# Patient Record
Sex: FEMALE | Race: WHITE | Employment: OTHER | URBAN - NONMETROPOLITAN AREA
[De-identification: names, ages, dates, MRNs, and addresses within clinical notes are randomized per-mention and may not be internally consistent; named-entity substitution may affect disease eponyms.]

---

## 2017-03-21 ENCOUNTER — OFFICE VISIT (OUTPATIENT)
Dept: CARDIOLOGY CLINIC | Age: 82
End: 2017-03-21

## 2017-03-21 VITALS
SYSTOLIC BLOOD PRESSURE: 82 MMHG | HEART RATE: 80 BPM | DIASTOLIC BLOOD PRESSURE: 56 MMHG | WEIGHT: 173 LBS | BODY MASS INDEX: 28.82 KG/M2 | RESPIRATION RATE: 16 BRPM | HEIGHT: 65 IN

## 2017-03-21 DIAGNOSIS — I34.1 MVP (MITRAL VALVE PROLAPSE): ICD-10-CM

## 2017-03-21 DIAGNOSIS — R25.1 TREMORS OF NERVOUS SYSTEM: ICD-10-CM

## 2017-03-21 DIAGNOSIS — F41.9 ANXIETY: ICD-10-CM

## 2017-03-21 DIAGNOSIS — R00.2 HEART PALPITATIONS: Primary | ICD-10-CM

## 2017-03-21 DIAGNOSIS — E78.2 MIXED HYPERLIPIDEMIA: ICD-10-CM

## 2017-03-21 PROCEDURE — 4040F PNEUMOC VAC/ADMIN/RCVD: CPT | Performed by: INTERNAL MEDICINE

## 2017-03-21 PROCEDURE — G8420 CALC BMI NORM PARAMETERS: HCPCS | Performed by: INTERNAL MEDICINE

## 2017-03-21 PROCEDURE — 1036F TOBACCO NON-USER: CPT | Performed by: INTERNAL MEDICINE

## 2017-03-21 PROCEDURE — 1123F ACP DISCUSS/DSCN MKR DOCD: CPT | Performed by: INTERNAL MEDICINE

## 2017-03-21 PROCEDURE — G8399 PT W/DXA RESULTS DOCUMENT: HCPCS | Performed by: INTERNAL MEDICINE

## 2017-03-21 PROCEDURE — G8484 FLU IMMUNIZE NO ADMIN: HCPCS | Performed by: INTERNAL MEDICINE

## 2017-03-21 PROCEDURE — G8427 DOCREV CUR MEDS BY ELIG CLIN: HCPCS | Performed by: INTERNAL MEDICINE

## 2017-03-21 PROCEDURE — 99213 OFFICE O/P EST LOW 20 MIN: CPT | Performed by: INTERNAL MEDICINE

## 2017-03-21 PROCEDURE — 1090F PRES/ABSN URINE INCON ASSESS: CPT | Performed by: INTERNAL MEDICINE

## 2017-03-21 RX ORDER — CLOPIDOGREL BISULFATE 75 MG/1
75 TABLET ORAL DAILY
Qty: 90 TABLET | Refills: 3 | Status: SHIPPED | OUTPATIENT
Start: 2017-03-21 | End: 2019-03-18 | Stop reason: SDUPTHER

## 2017-03-21 RX ORDER — LOVASTATIN 20 MG/1
TABLET ORAL
Qty: 90 TABLET | Refills: 3 | Status: SHIPPED | OUTPATIENT
Start: 2017-03-21 | End: 2020-07-13

## 2017-03-21 RX ORDER — PROPRANOLOL HYDROCHLORIDE 10 MG/1
TABLET ORAL
Refills: 3 | COMMUNITY
Start: 2017-02-27 | End: 2019-03-18 | Stop reason: DRUGHIGH

## 2018-02-13 LAB
ALBUMIN SERPL-MCNC: 4.4 G/DL
ALP BLD-CCNC: 66 U/L
ALT SERPL-CCNC: 13 U/L
ANION GAP SERPL CALCULATED.3IONS-SCNC: NORMAL MMOL/L
AST SERPL-CCNC: 17 U/L
BASOPHILS ABSOLUTE: NORMAL /ΜL
BASOPHILS RELATIVE PERCENT: NORMAL %
BILIRUB SERPL-MCNC: 0.5 MG/DL (ref 0.1–1.4)
BUN BLDV-MCNC: 20 MG/DL
CALCIUM SERPL-MCNC: NORMAL MG/DL
CHLORIDE BLD-SCNC: 101 MMOL/L
CHOLESTEROL, TOTAL: 191 MG/DL
CHOLESTEROL/HDL RATIO: ABNORMAL
CO2: NORMAL MMOL/L
CREAT SERPL-MCNC: 0.6 MG/DL
EOSINOPHILS ABSOLUTE: NORMAL /ΜL
EOSINOPHILS RELATIVE PERCENT: NORMAL %
GFR CALCULATED: NORMAL
GLUCOSE BLD-MCNC: 91 MG/DL
HCT VFR BLD CALC: 41.3 % (ref 36–46)
HDLC SERPL-MCNC: 81 MG/DL (ref 35–70)
HEMOGLOBIN: 14.2 G/DL (ref 12–16)
LDL CHOLESTEROL CALCULATED: 84 MG/DL (ref 0–160)
LYMPHOCYTES ABSOLUTE: NORMAL /ΜL
LYMPHOCYTES RELATIVE PERCENT: NORMAL %
MCH RBC QN AUTO: NORMAL PG
MCHC RBC AUTO-ENTMCNC: NORMAL G/DL
MCV RBC AUTO: NORMAL FL
MONOCYTES ABSOLUTE: NORMAL /ΜL
MONOCYTES RELATIVE PERCENT: NORMAL %
NEUTROPHILS ABSOLUTE: NORMAL /ΜL
NEUTROPHILS RELATIVE PERCENT: NORMAL %
PLATELET # BLD: 207 K/ΜL
PMV BLD AUTO: NORMAL FL
POTASSIUM SERPL-SCNC: 4.2 MMOL/L
RBC # BLD: 4.54 10^6/ΜL
SODIUM BLD-SCNC: 142 MMOL/L
TOTAL PROTEIN: NORMAL
TRIGL SERPL-MCNC: 128 MG/DL
VLDLC SERPL CALC-MCNC: ABNORMAL MG/DL
WBC # BLD: 4.5 10^3/ML

## 2018-03-20 ENCOUNTER — OFFICE VISIT (OUTPATIENT)
Dept: CARDIOLOGY CLINIC | Age: 83
End: 2018-03-20

## 2018-03-20 VITALS
RESPIRATION RATE: 16 BRPM | BODY MASS INDEX: 28.16 KG/M2 | SYSTOLIC BLOOD PRESSURE: 96 MMHG | DIASTOLIC BLOOD PRESSURE: 52 MMHG | HEIGHT: 65 IN | WEIGHT: 169 LBS | HEART RATE: 76 BPM

## 2018-03-20 DIAGNOSIS — I25.10 CORONARY ARTERY DISEASE INVOLVING NATIVE CORONARY ARTERY OF NATIVE HEART WITHOUT ANGINA PECTORIS: Primary | ICD-10-CM

## 2018-03-20 DIAGNOSIS — E78.2 MIXED HYPERLIPIDEMIA: ICD-10-CM

## 2018-03-20 DIAGNOSIS — R00.2 HEART PALPITATIONS: ICD-10-CM

## 2018-03-20 DIAGNOSIS — R25.1 TREMORS OF NERVOUS SYSTEM: ICD-10-CM

## 2018-03-20 PROCEDURE — 1090F PRES/ABSN URINE INCON ASSESS: CPT | Performed by: INTERNAL MEDICINE

## 2018-03-20 PROCEDURE — 1036F TOBACCO NON-USER: CPT | Performed by: INTERNAL MEDICINE

## 2018-03-20 PROCEDURE — 4040F PNEUMOC VAC/ADMIN/RCVD: CPT | Performed by: INTERNAL MEDICINE

## 2018-03-20 PROCEDURE — G8419 CALC BMI OUT NRM PARAM NOF/U: HCPCS | Performed by: INTERNAL MEDICINE

## 2018-03-20 PROCEDURE — G8484 FLU IMMUNIZE NO ADMIN: HCPCS | Performed by: INTERNAL MEDICINE

## 2018-03-20 PROCEDURE — 99214 OFFICE O/P EST MOD 30 MIN: CPT | Performed by: INTERNAL MEDICINE

## 2018-03-20 PROCEDURE — G8598 ASA/ANTIPLAT THER USED: HCPCS | Performed by: INTERNAL MEDICINE

## 2018-03-20 PROCEDURE — G8427 DOCREV CUR MEDS BY ELIG CLIN: HCPCS | Performed by: INTERNAL MEDICINE

## 2018-03-20 PROCEDURE — G8399 PT W/DXA RESULTS DOCUMENT: HCPCS | Performed by: INTERNAL MEDICINE

## 2018-03-20 PROCEDURE — 1123F ACP DISCUSS/DSCN MKR DOCD: CPT | Performed by: INTERNAL MEDICINE

## 2018-03-20 RX ORDER — CELECOXIB 200 MG/1
200 CAPSULE ORAL DAILY
COMMUNITY

## 2018-03-20 NOTE — ASSESSMENT & PLAN NOTE
Patient is to bring most recent lipid results for further recommendations. Continue the current dose of statin for now.

## 2018-03-20 NOTE — PROGRESS NOTES
Keesha Louise  1935  Sandip Escalante MD    Chief complaint and HPI:  Keesha Louise  is 60-year-old female follows up for known history of ectatic coronary arteries, hyperlipidemia and a history of palpitations. She denies chest pain, syncope or near syncope. She has palpitations once in a while they don't last long. She is not a smoker and has been fairly compliant to her medications. She has developed a spur on her right knee and it hurts when she walks and she is not physically very active however does a lot of yard work during summertime. She takes Lovastatin and Plavix. She states she had recent labs done but no results in Epic. Rest of the Cardiovascular system review is otherwise unchanged from prior encounter. Past medical history:  has a past medical history of Anxiety; Coronary artery disease involving native coronary artery of native heart without angina pectoris; Essential tremor; H/O cardiac catheterization; H/O cardiovascular stress test; H/O echocardiogram; H/O exercise stress test; Heart murmur; Heart palpitations; Hyperlipidemia; MVP (mitral valve prolapse); and Obesity. Past surgical history:  has a past surgical history that includes Cholecystectomy; Tonsillectomy; and Dilation and curettage of uterus. Social History:   Social History   Substance Use Topics    Smoking status: Never Smoker    Smokeless tobacco: Never Used    Alcohol use No     Family history: family history includes Cancer in her father and mother; Heart Attack in her father. ALLERGIES:  Lescol; Lipitor; Pcn [penicillins]; and Zocor [simvastatin]  Prior to Admission medications    Medication Sig Start Date End Date Taking?  Authorizing Provider   celecoxib (CELEBREX) 200 MG capsule Take 200 mg by mouth daily   Yes Historical Provider, MD   propranolol (INDERAL) 10 MG tablet TAKE 1 TABLET BY MOUTH 3 TIMES A DAY 2/27/17  Yes Historical Provider, MD   clopidogrel (PLAVIX) 75 MG tablet Take 1 tablet by mouth coronary artery of native heart without angina pectoris  Continue Plavix and statin therapy. Heart palpitations  Not very symptomatic to alter any thing now. Continue Inderal 10 mg 3 times a day. Hyperlipidemia  Patient is to bring most recent lipid results for further recommendations. Continue the current dose of statin for now. Tremors of nervous system  This seemed to be stable for now. Continue current cardiovascular medications which have been reviewed and discussed individually with you. Primary prevention is the goal by aggressive risk modification, healthy and therapeutic life style changes for cardiovascular risk reduction. Please bring all medication bottles and most recent labs to each office visit  Appropriate prescriptions if needed on this visit are addressed. After visit summery is provided. Questions answered and patient verbalizes understanding. Follow up in office in 12 months, sooner if needed. Liz Carney MD, 3/20/2018 2:55 PM     Please note this report has been partially produced using speech recognition software and may contain errors related to that system including errors in grammar, punctuation, and spelling, as well as words and phrases that may be inappropriate. If there are any questions or concerns please feel free to contact the dictating provider for clarification.

## 2018-05-07 ENCOUNTER — TELEPHONE (OUTPATIENT)
Dept: CARDIOLOGY CLINIC | Age: 83
End: 2018-05-07

## 2019-03-18 ENCOUNTER — OFFICE VISIT (OUTPATIENT)
Dept: CARDIOLOGY CLINIC | Age: 84
End: 2019-03-18
Payer: MEDICARE

## 2019-03-18 VITALS
WEIGHT: 179 LBS | DIASTOLIC BLOOD PRESSURE: 66 MMHG | HEART RATE: 67 BPM | RESPIRATION RATE: 16 BRPM | BODY MASS INDEX: 29.82 KG/M2 | HEIGHT: 65 IN | SYSTOLIC BLOOD PRESSURE: 110 MMHG

## 2019-03-18 DIAGNOSIS — I25.10 CORONARY ARTERY DISEASE INVOLVING NATIVE CORONARY ARTERY OF NATIVE HEART WITHOUT ANGINA PECTORIS: ICD-10-CM

## 2019-03-18 DIAGNOSIS — R00.2 HEART PALPITATIONS: ICD-10-CM

## 2019-03-18 DIAGNOSIS — M25.512 LEFT SHOULDER PAIN, UNSPECIFIED CHRONICITY: ICD-10-CM

## 2019-03-18 DIAGNOSIS — R07.9 CHEST PAIN, UNSPECIFIED TYPE: ICD-10-CM

## 2019-03-18 DIAGNOSIS — E78.2 MIXED HYPERLIPIDEMIA: ICD-10-CM

## 2019-03-18 DIAGNOSIS — Z01.810 PREOP CARDIOVASCULAR EXAM: Primary | ICD-10-CM

## 2019-03-18 PROCEDURE — 93000 ELECTROCARDIOGRAM COMPLETE: CPT | Performed by: INTERNAL MEDICINE

## 2019-03-18 PROCEDURE — 1101F PT FALLS ASSESS-DOCD LE1/YR: CPT | Performed by: INTERNAL MEDICINE

## 2019-03-18 PROCEDURE — 99213 OFFICE O/P EST LOW 20 MIN: CPT | Performed by: INTERNAL MEDICINE

## 2019-03-18 PROCEDURE — G8399 PT W/DXA RESULTS DOCUMENT: HCPCS | Performed by: INTERNAL MEDICINE

## 2019-03-18 PROCEDURE — 1123F ACP DISCUSS/DSCN MKR DOCD: CPT | Performed by: INTERNAL MEDICINE

## 2019-03-18 PROCEDURE — G8598 ASA/ANTIPLAT THER USED: HCPCS | Performed by: INTERNAL MEDICINE

## 2019-03-18 PROCEDURE — 1036F TOBACCO NON-USER: CPT | Performed by: INTERNAL MEDICINE

## 2019-03-18 PROCEDURE — 1090F PRES/ABSN URINE INCON ASSESS: CPT | Performed by: INTERNAL MEDICINE

## 2019-03-18 PROCEDURE — G8419 CALC BMI OUT NRM PARAM NOF/U: HCPCS | Performed by: INTERNAL MEDICINE

## 2019-03-18 PROCEDURE — G8484 FLU IMMUNIZE NO ADMIN: HCPCS | Performed by: INTERNAL MEDICINE

## 2019-03-18 PROCEDURE — 4040F PNEUMOC VAC/ADMIN/RCVD: CPT | Performed by: INTERNAL MEDICINE

## 2019-03-18 PROCEDURE — G8427 DOCREV CUR MEDS BY ELIG CLIN: HCPCS | Performed by: INTERNAL MEDICINE

## 2019-03-18 RX ORDER — PROPRANOLOL HYDROCHLORIDE 10 MG/1
20 TABLET ORAL 3 TIMES DAILY PRN
COMMUNITY
End: 2020-07-13

## 2019-03-18 RX ORDER — VIT A/VIT C/VIT E/ZINC/COPPER 4296-226
1 CAPSULE ORAL 2 TIMES DAILY
COMMUNITY

## 2019-03-18 RX ORDER — CLOPIDOGREL BISULFATE 75 MG/1
75 TABLET ORAL DAILY
Qty: 90 TABLET | Refills: 3 | Status: SHIPPED | OUTPATIENT
Start: 2019-03-18 | End: 2019-10-07 | Stop reason: SDUPTHER

## 2019-10-07 ENCOUNTER — OFFICE VISIT (OUTPATIENT)
Dept: CARDIOLOGY CLINIC | Age: 84
End: 2019-10-07
Payer: MEDICARE

## 2019-10-07 VITALS
SYSTOLIC BLOOD PRESSURE: 112 MMHG | BODY MASS INDEX: 29.82 KG/M2 | DIASTOLIC BLOOD PRESSURE: 70 MMHG | RESPIRATION RATE: 16 BRPM | WEIGHT: 179 LBS | HEIGHT: 65 IN | HEART RATE: 60 BPM

## 2019-10-07 DIAGNOSIS — M25.512 LEFT SHOULDER PAIN, UNSPECIFIED CHRONICITY: ICD-10-CM

## 2019-10-07 DIAGNOSIS — R00.2 HEART PALPITATIONS: ICD-10-CM

## 2019-10-07 DIAGNOSIS — E78.2 MIXED HYPERLIPIDEMIA: ICD-10-CM

## 2019-10-07 DIAGNOSIS — I25.10 CORONARY ARTERY DISEASE INVOLVING NATIVE CORONARY ARTERY OF NATIVE HEART WITHOUT ANGINA PECTORIS: ICD-10-CM

## 2019-10-07 DIAGNOSIS — Z01.810 PREOP CARDIOVASCULAR EXAM: Primary | ICD-10-CM

## 2019-10-07 LAB
BASOPHILS ABSOLUTE: NORMAL /ΜL
BASOPHILS RELATIVE PERCENT: NORMAL %
BUN BLDV-MCNC: 16 MG/DL
CALCIUM SERPL-MCNC: NORMAL MG/DL
CHLORIDE BLD-SCNC: 103 MMOL/L
CO2: NORMAL MMOL/L
CREAT SERPL-MCNC: 0.8 MG/DL
EOSINOPHILS ABSOLUTE: NORMAL /ΜL
EOSINOPHILS RELATIVE PERCENT: NORMAL %
GFR CALCULATED: NORMAL
GLUCOSE BLD-MCNC: 95 MG/DL
HCT VFR BLD CALC: 41.1 % (ref 36–46)
HEMOGLOBIN: 13.9 G/DL (ref 12–16)
LYMPHOCYTES ABSOLUTE: NORMAL /ΜL
LYMPHOCYTES RELATIVE PERCENT: NORMAL %
MCH RBC QN AUTO: NORMAL PG
MCHC RBC AUTO-ENTMCNC: NORMAL G/DL
MCV RBC AUTO: NORMAL FL
MONOCYTES ABSOLUTE: NORMAL /ΜL
MONOCYTES RELATIVE PERCENT: NORMAL %
NEUTROPHILS ABSOLUTE: NORMAL /ΜL
NEUTROPHILS RELATIVE PERCENT: NORMAL %
PLATELET # BLD: 183 K/ΜL
PMV BLD AUTO: NORMAL FL
POTASSIUM SERPL-SCNC: 4.5 MMOL/L
RBC # BLD: 4.52 10^6/ΜL
SODIUM BLD-SCNC: 139 MMOL/L
WBC # BLD: 3.9 10^3/ML

## 2019-10-07 PROCEDURE — 1123F ACP DISCUSS/DSCN MKR DOCD: CPT | Performed by: INTERNAL MEDICINE

## 2019-10-07 PROCEDURE — G8598 ASA/ANTIPLAT THER USED: HCPCS | Performed by: INTERNAL MEDICINE

## 2019-10-07 PROCEDURE — G8427 DOCREV CUR MEDS BY ELIG CLIN: HCPCS | Performed by: INTERNAL MEDICINE

## 2019-10-07 PROCEDURE — 99214 OFFICE O/P EST MOD 30 MIN: CPT | Performed by: INTERNAL MEDICINE

## 2019-10-07 PROCEDURE — G8484 FLU IMMUNIZE NO ADMIN: HCPCS | Performed by: INTERNAL MEDICINE

## 2019-10-07 PROCEDURE — G8399 PT W/DXA RESULTS DOCUMENT: HCPCS | Performed by: INTERNAL MEDICINE

## 2019-10-07 PROCEDURE — 4040F PNEUMOC VAC/ADMIN/RCVD: CPT | Performed by: INTERNAL MEDICINE

## 2019-10-07 PROCEDURE — 1090F PRES/ABSN URINE INCON ASSESS: CPT | Performed by: INTERNAL MEDICINE

## 2019-10-07 PROCEDURE — G8419 CALC BMI OUT NRM PARAM NOF/U: HCPCS | Performed by: INTERNAL MEDICINE

## 2019-10-07 PROCEDURE — 93000 ELECTROCARDIOGRAM COMPLETE: CPT | Performed by: INTERNAL MEDICINE

## 2019-10-07 PROCEDURE — 1036F TOBACCO NON-USER: CPT | Performed by: INTERNAL MEDICINE

## 2019-10-07 RX ORDER — CLOPIDOGREL BISULFATE 75 MG/1
75 TABLET ORAL DAILY
Qty: 90 TABLET | Refills: 3 | Status: SHIPPED | OUTPATIENT
Start: 2019-10-07 | End: 2021-02-09 | Stop reason: SDUPTHER

## 2019-11-06 PROBLEM — Z01.810 PREOP CARDIOVASCULAR EXAM: Status: RESOLVED | Noted: 2019-10-07 | Resolved: 2019-11-06

## 2020-07-10 LAB
ALBUMIN SERPL-MCNC: 4 G/DL
ALP BLD-CCNC: 61 U/L
ALT SERPL-CCNC: 11 U/L
ANION GAP SERPL CALCULATED.3IONS-SCNC: NORMAL MMOL/L
AST SERPL-CCNC: 15 U/L
BASOPHILS ABSOLUTE: NORMAL
BASOPHILS RELATIVE PERCENT: NORMAL
BILIRUB SERPL-MCNC: 0.4 MG/DL (ref 0.1–1.4)
BUN BLDV-MCNC: 19 MG/DL
CALCIUM SERPL-MCNC: NORMAL MG/DL
CHLORIDE BLD-SCNC: 100 MMOL/L
CHOLESTEROL, TOTAL: 171 MG/DL
CHOLESTEROL/HDL RATIO: NORMAL
CO2: NORMAL
CREAT SERPL-MCNC: 0.7 MG/DL
EOSINOPHILS ABSOLUTE: NORMAL
EOSINOPHILS RELATIVE PERCENT: NORMAL
GFR CALCULATED: NORMAL
GLUCOSE BLD-MCNC: 90 MG/DL
HCT VFR BLD CALC: 40.2 % (ref 36–46)
HDLC SERPL-MCNC: 69 MG/DL (ref 35–70)
HEMOGLOBIN: 13.4 G/DL (ref 12–16)
LDL CHOLESTEROL CALCULATED: 72 MG/DL (ref 0–160)
LYMPHOCYTES ABSOLUTE: NORMAL
LYMPHOCYTES RELATIVE PERCENT: NORMAL
MCH RBC QN AUTO: NORMAL PG
MCHC RBC AUTO-ENTMCNC: NORMAL G/DL
MCV RBC AUTO: NORMAL FL
MONOCYTES ABSOLUTE: NORMAL
MONOCYTES RELATIVE PERCENT: NORMAL
NEUTROPHILS ABSOLUTE: NORMAL
NEUTROPHILS RELATIVE PERCENT: NORMAL
PLATELET # BLD: 199 K/ΜL
PMV BLD AUTO: NORMAL FL
POTASSIUM SERPL-SCNC: 4.2 MMOL/L
RBC # BLD: 4.34 10^6/ΜL
SODIUM BLD-SCNC: 141 MMOL/L
TOTAL PROTEIN: NORMAL
TRIGL SERPL-MCNC: 150 MG/DL
TSH SERPL DL<=0.05 MIU/L-ACNC: 2.73 UIU/ML
VLDLC SERPL CALC-MCNC: NORMAL MG/DL
WBC # BLD: 5.6 10^3/ML

## 2020-07-13 ENCOUNTER — VIRTUAL VISIT (OUTPATIENT)
Dept: CARDIOLOGY CLINIC | Age: 85
End: 2020-07-13
Payer: MEDICARE

## 2020-07-13 PROCEDURE — G8427 DOCREV CUR MEDS BY ELIG CLIN: HCPCS | Performed by: INTERNAL MEDICINE

## 2020-07-13 PROCEDURE — 1123F ACP DISCUSS/DSCN MKR DOCD: CPT | Performed by: INTERNAL MEDICINE

## 2020-07-13 PROCEDURE — 1036F TOBACCO NON-USER: CPT | Performed by: INTERNAL MEDICINE

## 2020-07-13 PROCEDURE — 4040F PNEUMOC VAC/ADMIN/RCVD: CPT | Performed by: INTERNAL MEDICINE

## 2020-07-13 PROCEDURE — 1090F PRES/ABSN URINE INCON ASSESS: CPT | Performed by: INTERNAL MEDICINE

## 2020-07-13 PROCEDURE — G8419 CALC BMI OUT NRM PARAM NOF/U: HCPCS | Performed by: INTERNAL MEDICINE

## 2020-07-13 PROCEDURE — 99214 OFFICE O/P EST MOD 30 MIN: CPT | Performed by: INTERNAL MEDICINE

## 2020-07-13 PROCEDURE — G8399 PT W/DXA RESULTS DOCUMENT: HCPCS | Performed by: INTERNAL MEDICINE

## 2020-07-13 RX ORDER — CHOLESTYRAMINE 4 G/9G
1 POWDER, FOR SUSPENSION ORAL 2 TIMES DAILY
COMMUNITY
End: 2021-01-06

## 2020-07-13 RX ORDER — ROSUVASTATIN CALCIUM 5 MG/1
5 TABLET, COATED ORAL DAILY
COMMUNITY
End: 2021-11-22

## 2020-07-13 RX ORDER — PROPRANOLOL HYDROCHLORIDE 120 MG/1
120 CAPSULE, EXTENDED RELEASE ORAL DAILY
COMMUNITY
End: 2021-01-06

## 2020-07-13 NOTE — PROGRESS NOTES
2020    TELEHEALTH EVALUATION -- Audio/Visual (During BPARU-23 public health emergency)    HPI:   Chief Complaint   Patient presents with    Coronary Artery Disease     for coronary artery disease, hyperlipidemia. She denies chest pain, shortness of breath, dizziness, edema or palpitations. She states she had recent labs done but no results in Epic.  Hyperlipidemia    6 Month Follow-Up     Greer Booker (:  1935) has requested an audio/video evaluation for known hypertension and hyperlipidemia and history of palpitations. Patient had the surgery done for uterine prolapse since last office visit in October and it has helped her quite a bit with with her symptoms she was having and she is not having any palpitations anymore. She has been compliant to her medications and apparently had a blood test done for cholesterol last week by PCP the results are not available yet. Her medications are reviewed. She does not smoke. Prior to Visit Medications    Medication Sig Taking?  Authorizing Provider   rosuvastatin (CRESTOR) 5 MG tablet Take 5 mg by mouth daily Yes Historical Provider, MD   propranolol (INDERAL LA) 120 MG extended release capsule Take 120 mg by mouth daily Yes Historical Provider, MD   cholestyramine (QUESTRAN) 4 g packet Take 1 packet by mouth 2 times daily Yes Historical Provider, MD   Probiotic Product (PROBIOTIC-10 PO) Take by mouth Yes Historical Provider, MD   clopidogrel (PLAVIX) 75 MG tablet Take 1 tablet by mouth daily Yes Julia Walsh MD   Multiple Vitamins-Minerals (PRESERVISION AREDS) CAPS Take 1 capsule by mouth 2 times daily Yes Historical Provider, MD   celecoxib (CELEBREX) 200 MG capsule Take 200 mg by mouth daily Yes Historical Provider, MD   Cholecalciferol (VITAMIN D-3 PO) Take by mouth daily Yes Historical Provider, MD     Social History     Tobacco Use    Smoking status: Never Smoker    Smokeless tobacco: Never Used   Substance Use Topics    Alcohol use: No     PHYSICAL EXAMINATION:  [ INSTRUCTIONS:  \"[x]\" Indicates a positive item  \"[]\" Indicates a negative item  -- DELETE ALL ITEMS NOT EXAMINED]  Vital Signs: (As obtained by patient/caregiver or practitioner observation)  Patient-Reported Vitals 7/13/2020   Patient-Reported Weight 175 lbs   Patient-Reported Height 5 5   Patient-Reported Systolic 249   Patient-Reported Diastolic 60       Wt Readings from Last 3 Encounters:   10/07/19 179 lb (81.2 kg)   03/18/19 179 lb (81.2 kg)   03/20/18 169 lb (76.7 kg)     Constitutional: [x] Appears well-developed and well-nourished [x] No apparent distress      [] Abnormal-   Mental status  [x] Alert and awake  [x] Oriented to person/place/time []Able to follow commands      Eyes:  EOM    [x]  Normal  [] Abnormal-  Sclera  []  Normal  [] Abnormal -         Discharge []  None visible  [] Abnormal -    HENT:   [x] Normocephalic, atraumatic. [] Abnormal   [] Mouth/Throat: Mucous membranes are moist.     External Ears [x] Normal  [] Abnormal-     Pulmonary/Chest: [x] Respiratory effort normal.  [x] No visualized signs of difficulty breathing or respiratory distress        [] Abnormal-      Neurological:        [x] No Facial Asymmetry (Cranial nerve 7 motor function) (limited exam to video visit)          [] No gaze palsy        [] Abnormal-         Skin:        [x] No significant exanthematous lesions or discoloration noted on facial skin         [] Abnormal-            Psychiatric:       [x] Normal Affect [] No Hallucinations        [] Abnormal-   Other pertinent observable physical exam findings-     Due to this being a TeleHealth encounter, evaluation of the following organ systems is limited: Vitals/Constitutional/EENT/Resp/CV/GI//MS/Neuro/Skin/Heme-Lymph-Imm.     Lab Results   Component Value Date    WBC 3.9 10/07/2019    HGB 13.9 10/07/2019    HCT 41.1 10/07/2019     10/07/2019     Lab Results   Component Value Date    CHOL 191 02/13/2018    TRIG 128 02/13/2018 HDL 81 (A) 02/13/2018    LDLCALC 84 02/13/2018     Lab Results   Component Value Date    BUN 16 10/07/2019    CREATININE 0.8 10/07/2019     10/07/2019    K 4.5 10/07/2019     ASSESSMENT/PLAN:    Coronary artery disease involving native coronary artery of native heart without angina pectoris  Clinically stable on current medications. Continue aggressive risk modification for primary prevention. Hyperlipidemia  Last LDL was 84. Continue current medications    Heart palpitations  Improved significantly. continue current medications. Multiple questions are answered and patient verbalized understanding. Follow up office visit in 6 months sooner if needed. Please bring all medication bottles and most recent labs to each office visit      An  electronic signature was used to authenticate this note. --Mariaa Cormier MD on 7/13/2020 at 2:30 PM        Pursuant to the emergency declaration under the [facility], [facility]5 waiver authority and the [employer] Act, this Virtual  Visit was conducted, with patient's consent, to reduce the patient's risk of exposure to COVID-19 and provide continuity of care for an established patient. Services were provided through a video synchronous discussion virtually to substitute for in-person clinic visit.

## 2020-07-15 LAB
ALBUMIN SERPL-MCNC: 4 G/DL
ALP BLD-CCNC: 61 U/L
ALT SERPL-CCNC: 11 U/L
ANION GAP SERPL CALCULATED.3IONS-SCNC: NORMAL MMOL/L
AST SERPL-CCNC: 15 U/L
BASOPHILS ABSOLUTE: NORMAL
BASOPHILS RELATIVE PERCENT: NORMAL
BILIRUB SERPL-MCNC: 0.4 MG/DL (ref 0.1–1.4)
BUN BLDV-MCNC: 27 MG/DL
CALCIUM SERPL-MCNC: 8.7 MG/DL
CHLORIDE BLD-SCNC: 100 MMOL/L
CHOLESTEROL, TOTAL: 171 MG/DL
CHOLESTEROL/HDL RATIO: NORMAL
CO2: 26 MMOL/L
CREAT SERPL-MCNC: 0.7 MG/DL
EOSINOPHILS ABSOLUTE: NORMAL
EOSINOPHILS RELATIVE PERCENT: NORMAL
GFR CALCULATED: NORMAL
GLUCOSE BLD-MCNC: 90 MG/DL
HCT VFR BLD CALC: 40.2 % (ref 36–46)
HDLC SERPL-MCNC: 69 MG/DL (ref 35–70)
HEMOGLOBIN: 13.4 G/DL (ref 12–16)
LDL CHOLESTEROL CALCULATED: 72 MG/DL (ref 0–160)
LYMPHOCYTES ABSOLUTE: NORMAL
LYMPHOCYTES RELATIVE PERCENT: NORMAL
MCH RBC QN AUTO: NORMAL PG
MCHC RBC AUTO-ENTMCNC: NORMAL G/DL
MCV RBC AUTO: NORMAL FL
MONOCYTES ABSOLUTE: NORMAL
MONOCYTES RELATIVE PERCENT: NORMAL
NEUTROPHILS ABSOLUTE: NORMAL
NEUTROPHILS RELATIVE PERCENT: NORMAL
PLATELET # BLD: 199 K/ΜL
PMV BLD AUTO: NORMAL FL
POTASSIUM SERPL-SCNC: 4.2 MMOL/L
RBC # BLD: 4.34 10^6/ΜL
SODIUM BLD-SCNC: 141 MMOL/L
TOTAL PROTEIN: 5.9
TRIGL SERPL-MCNC: 150 MG/DL
TSH SERPL DL<=0.05 MIU/L-ACNC: 2.73 UIU/ML
VLDLC SERPL CALC-MCNC: 30 MG/DL
WBC # BLD: 5.6 10^3/ML

## 2020-08-25 ENCOUNTER — HOSPITAL ENCOUNTER (OUTPATIENT)
Age: 85
Discharge: HOME | End: 2020-08-25
Payer: MEDICARE

## 2020-08-25 PROCEDURE — 36415 COLL VENOUS BLD VENIPUNCTURE: CPT

## 2020-08-25 PROCEDURE — 86320 SERUM IMMUNOELECTROPHORESIS: CPT

## 2020-08-25 PROCEDURE — 82652 VIT D 1 25-DIHYDROXY: CPT

## 2020-08-25 PROCEDURE — 82746 ASSAY OF FOLIC ACID SERUM: CPT

## 2020-08-25 PROCEDURE — 82607 VITAMIN B-12: CPT

## 2020-08-26 LAB
FOLATE: 14.5 NG/ML (ref 3.1–17.5)
SPEP INTERPRETATION: NORMAL

## 2020-08-28 LAB
VITAMIN B-12: 872 PG/ML (ref 180–914)
VITAMIN D 1,25-DIHYDROXY: 61.7 PG/ML (ref 19.9–79.3)

## 2020-12-05 ENCOUNTER — APPOINTMENT (OUTPATIENT)
Dept: GENERAL RADIOLOGY | Age: 85
End: 2020-12-05
Payer: MEDICARE

## 2020-12-05 ENCOUNTER — HOSPITAL ENCOUNTER (EMERGENCY)
Age: 85
Discharge: HOME | End: 2020-12-05
Attending: EMERGENCY MEDICINE
Payer: MEDICARE

## 2020-12-05 VITALS
OXYGEN SATURATION: 95 % | DIASTOLIC BLOOD PRESSURE: 77 MMHG | WEIGHT: 175 LBS | SYSTOLIC BLOOD PRESSURE: 130 MMHG | BODY MASS INDEX: 29.88 KG/M2 | HEART RATE: 91 BPM | TEMPERATURE: 99.8 F | RESPIRATION RATE: 16 BRPM | HEIGHT: 64 IN

## 2020-12-05 LAB
ALBUMIN SERPL-MCNC: 3.7 GM/DL (ref 3.4–5)
ALP BLD-CCNC: 75 IU/L (ref 40–129)
ALT SERPL-CCNC: 9 U/L (ref 10–40)
ANION GAP SERPL CALCULATED.3IONS-SCNC: 11 MMOL/L (ref 4–16)
APTT: 40.4 SECONDS (ref 25.1–37.1)
AST SERPL-CCNC: 21 IU/L (ref 15–37)
BASOPHILS ABSOLUTE: 0 K/CU MM
BASOPHILS RELATIVE PERCENT: 0.2 % (ref 0–1)
BILIRUB SERPL-MCNC: 0.5 MG/DL (ref 0–1)
BUN BLDV-MCNC: 14 MG/DL (ref 6–23)
CALCIUM SERPL-MCNC: 8.5 MG/DL (ref 8.3–10.6)
CHLORIDE BLD-SCNC: 101 MMOL/L (ref 99–110)
CO2: 24 MMOL/L (ref 21–32)
CREAT SERPL-MCNC: 0.8 MG/DL (ref 0.6–1.1)
DIFFERENTIAL TYPE: ABNORMAL
EOSINOPHILS ABSOLUTE: 0.1 K/CU MM
EOSINOPHILS RELATIVE PERCENT: 0.9 % (ref 0–3)
GFR AFRICAN AMERICAN: >60 ML/MIN/1.73M2
GFR NON-AFRICAN AMERICAN: >60 ML/MIN/1.73M2
GLUCOSE BLD-MCNC: 113 MG/DL (ref 70–99)
HCT VFR BLD CALC: 39.2 % (ref 37–47)
HEMOGLOBIN: 12.7 GM/DL (ref 12.5–16)
IMMATURE NEUTROPHIL %: 0.2 % (ref 0–0.43)
INR BLD: 1.1 INDEX
LACTATE: 1.2 MMOL/L (ref 0.4–2)
LIPASE: 32 IU/L (ref 13–60)
LYMPHOCYTES ABSOLUTE: 1.3 K/CU MM
LYMPHOCYTES RELATIVE PERCENT: 23.4 % (ref 24–44)
MCH RBC QN AUTO: 30.1 PG (ref 27–31)
MCHC RBC AUTO-ENTMCNC: 32.4 % (ref 32–36)
MCV RBC AUTO: 92.9 FL (ref 78–100)
MONOCYTES ABSOLUTE: 0.7 K/CU MM
MONOCYTES RELATIVE PERCENT: 12.4 % (ref 0–4)
PDW BLD-RTO: 12.8 % (ref 11.7–14.9)
PLATELET # BLD: 185 K/CU MM (ref 140–440)
PMV BLD AUTO: 9.6 FL (ref 7.5–11.1)
POTASSIUM SERPL-SCNC: 4 MMOL/L (ref 3.5–5.1)
PRO-BNP: 74.53 PG/ML
PROTHROMBIN TIME: 12.6 SECONDS (ref 11.7–14.5)
RBC # BLD: 4.22 M/CU MM (ref 4.2–5.4)
SARS-COV-2, NAAT: DETECTED
SEGMENTED NEUTROPHILS ABSOLUTE COUNT: 3.4 K/CU MM
SEGMENTED NEUTROPHILS RELATIVE PERCENT: 62.9 % (ref 36–66)
SODIUM BLD-SCNC: 136 MMOL/L (ref 135–145)
SOURCE: ABNORMAL
TOTAL IMMATURE NEUTOROPHIL: 0.01 K/CU MM
TOTAL PROTEIN: 6.7 GM/DL (ref 6.4–8.2)
TROPONIN T: <0.01 NG/ML
WBC # BLD: 5.4 K/CU MM (ref 4–10.5)

## 2020-12-05 PROCEDURE — 93005 ELECTROCARDIOGRAM TRACING: CPT | Performed by: EMERGENCY MEDICINE

## 2020-12-05 PROCEDURE — 83690 ASSAY OF LIPASE: CPT

## 2020-12-05 PROCEDURE — 84484 ASSAY OF TROPONIN QUANT: CPT

## 2020-12-05 PROCEDURE — 96374 THER/PROPH/DIAG INJ IV PUSH: CPT

## 2020-12-05 PROCEDURE — 83605 ASSAY OF LACTIC ACID: CPT

## 2020-12-05 PROCEDURE — 6370000000 HC RX 637 (ALT 250 FOR IP): Performed by: EMERGENCY MEDICINE

## 2020-12-05 PROCEDURE — 74022 RADEX COMPL AQT ABD SERIES: CPT

## 2020-12-05 PROCEDURE — 80053 COMPREHEN METABOLIC PANEL: CPT

## 2020-12-05 PROCEDURE — 85610 PROTHROMBIN TIME: CPT

## 2020-12-05 PROCEDURE — 83880 ASSAY OF NATRIURETIC PEPTIDE: CPT

## 2020-12-05 PROCEDURE — C9803 HOPD COVID-19 SPEC COLLECT: HCPCS

## 2020-12-05 PROCEDURE — U0002 COVID-19 LAB TEST NON-CDC: HCPCS

## 2020-12-05 PROCEDURE — 85730 THROMBOPLASTIN TIME PARTIAL: CPT

## 2020-12-05 PROCEDURE — 6360000002 HC RX W HCPCS: Performed by: EMERGENCY MEDICINE

## 2020-12-05 PROCEDURE — 99283 EMERGENCY DEPT VISIT LOW MDM: CPT

## 2020-12-05 PROCEDURE — 85025 COMPLETE CBC W/AUTO DIFF WBC: CPT

## 2020-12-05 PROCEDURE — 2580000003 HC RX 258: Performed by: EMERGENCY MEDICINE

## 2020-12-05 RX ORDER — AZITHROMYCIN 250 MG/1
250 TABLET, FILM COATED ORAL SEE ADMIN INSTRUCTIONS
Qty: 6 TABLET | Refills: 0 | Status: SHIPPED | OUTPATIENT
Start: 2020-12-05 | End: 2020-12-10

## 2020-12-05 RX ORDER — DEXAMETHASONE SODIUM PHOSPHATE 10 MG/ML
8 INJECTION, SOLUTION INTRAMUSCULAR; INTRAVENOUS ONCE
Status: COMPLETED | OUTPATIENT
Start: 2020-12-05 | End: 2020-12-05

## 2020-12-05 RX ORDER — TOPIRAMATE 25 MG/1
25 TABLET ORAL 2 TIMES DAILY
COMMUNITY

## 2020-12-05 RX ORDER — AZITHROMYCIN 250 MG/1
500 TABLET, FILM COATED ORAL ONCE
Status: COMPLETED | OUTPATIENT
Start: 2020-12-05 | End: 2020-12-05

## 2020-12-05 RX ORDER — BENZONATATE 200 MG/1
200 CAPSULE ORAL 3 TIMES DAILY PRN
Qty: 15 CAPSULE | Refills: 0 | Status: SHIPPED | OUTPATIENT
Start: 2020-12-05 | End: 2020-12-10

## 2020-12-05 RX ORDER — ALBUTEROL SULFATE 90 UG/1
2 AEROSOL, METERED RESPIRATORY (INHALATION) 4 TIMES DAILY PRN
Qty: 1 INHALER | Refills: 0 | Status: SHIPPED | OUTPATIENT
Start: 2020-12-05 | End: 2022-02-23

## 2020-12-05 RX ORDER — 0.9 % SODIUM CHLORIDE 0.9 %
1000 INTRAVENOUS SOLUTION INTRAVENOUS ONCE
Status: COMPLETED | OUTPATIENT
Start: 2020-12-05 | End: 2020-12-05

## 2020-12-05 RX ADMIN — DEXAMETHASONE SODIUM PHOSPHATE 8 MG: 10 INJECTION, SOLUTION INTRAMUSCULAR; INTRAVENOUS at 20:28

## 2020-12-05 RX ADMIN — AZITHROMYCIN MONOHYDRATE 500 MG: 250 TABLET ORAL at 20:28

## 2020-12-05 RX ADMIN — SODIUM CHLORIDE 1000 ML: 9 INJECTION, SOLUTION INTRAVENOUS at 19:00

## 2020-12-05 NOTE — ED PROVIDER NOTES
Emergency Department Encounter    Patient: Cheryl Diane  MRN: [id number]  : 1935  Date of Evaluation: 2020  ED Provider:  GARCIA MORALES    Triage Chief Complaint:   Fatigue (pt states symptoms started 2 weeks ago); Anorexia; Cough; and Diarrhea      [place]:  Cheryl Diane is a 81 y.o. female that presents to the emergency department with a constellation of symptoms beginning about 2 weeks ago with cough. She reports a chronic cough that became much more frequent at that time. Cough is predominantly dry, but she is occasionally able to produce a small amount of phlegm. She has not noted the color. She denies any current shortness of breath, chest pain or discomfort, or upper respiratory symptoms. Since then, she has become extremely fatigued and weak all over. She reports a poor appetite. She has had numerous episodes of diarrhea, frequently watery without mucus or blood. She denies any abdominal pain at this time. She denies pre-existing Crohn's disease, ulcerative colitis, diverticulitis, or other digestive pathology. Patient was at a birthday party with a grandson on , and that grandson later tested positive for COVID-19. This patient has not been tested. Patient denies pre-existing COPD, home oxygen use, or diabetes. Patient reports no other particular provocative or alleviating factors. ROS - see HPI, below listed is current ROS at time of my eval:  CONSTITUTIONAL: No fevers, chills, or sweats. EYES: No vision change, redness, drainage, or discharge. HENT: Positive for chronic runny nose. No sore throat or earache. No dental pain. No painful swallowing. RESPIRATORY: No difficulty breathing. CARDIOVASCULAR: No anginal-type chest pain, orthopnea, or edema. GASTROINTESTINAL: No nausea, vomiting, or abdominal pain. No hematemesis, hematochezia, or melena. GENITOURINARY: No frequency, urgency, or dysuria. No hematuria. MUSCULOSKELETAL: No recent injury.   No neck, back, or extremity pain. NEUROLOGICAL: No focal weakness, numbness, or tingling. SKIN: No rashes or other lesions reported. No yellowing of the skin. Past Medical History:   Diagnosis Date    Anxiety     Coronary artery disease involving native coronary artery of native heart without angina pectoris 3/20/2018    Ectatic vessel with sluggish blood flow 2004 cath    Essential tremor     H/O cardiac catheterization 1993, 2004    Ectasia of cornaries, normal EF    H/O cardiovascular stress test 4/29/14    Stress Cardiolite. Normal perfusion in the distribution of all coronaries, normal LV size and function, EF 70%.     H/O echocardiogram 4/29/14    EF 50-55%, mild TR, doppler flow pattern is suggestive of impaired LV relaxation    H/O exercise stress test 5/93    Heart murmur     Heart palpitations     Hyperlipidemia     MVP (mitral valve prolapse)     Obesity      Past Surgical History:   Procedure Laterality Date    CHOLECYSTECTOMY      DILATION AND CURETTAGE OF UTERUS      TONSILLECTOMY      VAGINAL PROLAPSE REPAIR  11/04/2019     Family History   Problem Relation Age of Onset    Cancer Mother     Heart Attack Father     Cancer Father      Social History     Socioeconomic History    Marital status:      Spouse name: Not on file    Number of children: Not on file    Years of education: Not on file    Highest education level: Not on file   Occupational History    Not on file   Social Needs    Financial resource strain: Not on file    Food insecurity     Worry: Not on file     Inability: Not on file    Transportation needs     Medical: Not on file     Non-medical: Not on file   Tobacco Use    Smoking status: Never Smoker    Smokeless tobacco: Never Used   Substance and Sexual Activity    Alcohol use: No    Drug use: No    Sexual activity: Not on file     Comment:    Lifestyle    Physical activity     Days per week: Not on file     Minutes per session: Not on file  Stress: Not on file   Relationships    Social connections     Talks on phone: Not on file     Gets together: Not on file     Attends Rastafarian service: Not on file     Active member of club or organization: Not on file     Attends meetings of clubs or organizations: Not on file     Relationship status: Not on file    Intimate partner violence     Fear of current or ex partner: Not on file     Emotionally abused: Not on file     Physically abused: Not on file     Forced sexual activity: Not on file   Other Topics Concern    Not on file   Social History Narrative    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             Current Facility-Administered Medications   Medication Dose Route Frequency Provider Last Rate Last Dose    0.9 % sodium chloride bolus  1,000 mL Intravenous Once Gabby Jaime MD         Current Outpatient Medications   Medication Sig Dispense Refill    topiramate (TOPAMAX) 25 MG tablet Take 25 mg by mouth 2 times daily      rosuvastatin (CRESTOR) 5 MG tablet Take 5 mg by mouth daily      propranolol (INDERAL LA) 120 MG extended release capsule Take 120 mg by mouth daily      cholestyramine (QUESTRAN) 4 g packet Take 1 packet by mouth 2 times daily      Probiotic Product (PROBIOTIC-10 PO) Take by mouth      clopidogrel (PLAVIX) 75 MG tablet Take 1 tablet by mouth daily 90 tablet 3    Multiple Vitamins-Minerals (PRESERVISION AREDS) CAPS Take 1 capsule by mouth 2 times daily      celecoxib (CELEBREX) 200 MG capsule Take 200 mg by mouth daily      Cholecalciferol (VITAMIN D-3 PO) Take by mouth daily       Allergies   Allergen Reactions    Lescol      GI Symptoms    Lipitor      Myalgia    Pcn [Penicillins]     Zocor [Simvastatin]      myalgia       Nursing Notes Reviewed    Physical Exam:  Triage VS:    ED Triage Vitals [12/05/20 1729]   Enc Vitals Group      /69      Pulse 98      Resp 24      Temp 99.8 °F (37.7 °C)      Temp Source Oral      SpO2 94 %      Weight 175 lb (79.4 kg) Height 5' 4\" (1.626 m)      Head Circumference       Peak Flow       Pain Score       Pain Loc       Pain Edu? Excl. in [address]? My pulse ox interpretation is - normal    GENERAL: Patient is awake, alert, and oriented appropriately. Patient is resting comfortably in a still position on the exam table. Patient speaking in full and complete sentences. Well-nourished and well-developed. HEENT: Normocephalic and atraumatic. No midface, zygomatic, maxillary, or mandibular tenderness. No dental malocclusion. Pupils equal, round, and reactive to light. No redness or matting. Bilateral external ears are unremarkable. Tympanic membranes are pearly and gray without visible effusion or retraction. Nasal mucosa is pink without purulence. Oral mucosa is moist and pink. There is no significant tonsillar enlargement or exudate. Uvula midline. There is no elevation of the tongue or pooling of secretions. NECK: Supple without Kernig's or Brudzinski signs. No significant lymphadenopathy or limitation range of motion. No midline spinal tenderness. RESPIRATORY: Symmetric aeration bilaterally. No audible wheezes, rales, rhonchi, or stridor. No chest wall tenderness. CARDIOVASCULAR: Regular rate and rhythm. No audible murmurs, rubs, or gallops. No central or peripheral cyanosis. GASTROINTESTINAL: Soft, nontender, and nondistended. No McBurney's or Salvador's point tenderness. No guarding, rebound, rigidity. No mass or pulsatile mass. Bowel sounds are present in all quadrants. No costovertebral angle tenderness. NEUROLOGICAL: Cranial nerves III through XII are grossly intact as tested without facial droop or dermatomal paresthesias. Of note, forehead wrinkles are symmetric and intact. Conjugate gaze without entrapment. No asymmetry of the corners of the mouth or nasolabial folds.   Motor exhibits 5/5 strength in the bilateral trapezius, biceps, triceps, handgrip, quadriceps, psoas, dorsiflexors, and plantar flexors. No gross dermatomal paresthesias. No gross deficits of the cerebellum. MUSCULOSKELETAL: No asymmetric edema, Homans' sign, or cords. No tenderness or limitation range of motion to the bilateral shoulders, elbows, wrists, hips, knees, or ankles. No accompanying long bone tenderness or deformity. SKIN: Normal tone for ethnicity. Normal turgor and brisk capillary refill peripherally. No petechiae, purpura, vesicles, bullae, or other lesions. No icterus. PSYCHIATRIC: Normal mood. Normal affect. No voiced suicidal or homicidal ideation. Patient does not respond to internal stimuli. Emergency department course. Patient is brought to bed 1 and assessed and reassessed by me. Prior to initial evaluation, initial medical screening studies ordered including CBC, metabolic panel troponin, and EKG among others. After initial evaluation, acute abdominal series is ordered. Patient denies any chest pain or discomfort at this time. She reports no shortness of breath. Lung sounds are mildly diminished but symmetric. Abdomen is nonsurgical.  Patient will have COVID-19 swab, but she is saturating appropriately on room air. Normal saline 1 L bolus will be provided. Patient is agreeable to continuing plan. Upon most recent reevaluation, patient remains generally well. Multiple medical screening studies are pending. Care is signed over to Dr. Adama Ramos. Disposition is pending. Patient seen during [facility], I did don appropriate PPE during my encounters with the patient, including n95 (when appropriate) mask and eye protection as appropriate.     I have reviewed and interpreted all of the currently available lab results from this visit (if applicable):  Pending    Radiographs (if obtained):  Radiologist's Report Reviewed:  Pending    EKG (if obtained): (All EKG's are interpreted by myself in the absence of a cardiologist)  Twelve-lead EKG interpreted by me in the absence of a cardiologist.  There is nonspecific T wave changes diffusely with no criteria ST elevation or reciprocal change. There are no hyperacute T wave changes. There is no sign of acute ischemia or infarction. This tracing shows a normal sinus rhythm. Rate and intervals are 84 beats per minute, AL interval 130 milliseconds, QRS duration 80 milliseconds, QTc interval 439 milliseconds, and R axis normal at -7 degrees. There is no acute change compared with the most recent EKG dated October 7, 2019. Medical decision making:  Patient presents to the emergency room with a constellation of signs and symptoms suspicious for viral syndrome, particularly COVID-19 with her exposure. Fortunately, there has been no respiratory distress, hypoxia, or cyanosis. Initial EKG does not suggest acute cardiac ischemia or infarction. Lung sounds not point towards fulminant pulmonary edema. Abdomen is nonsurgical.    Procedures: None as of this dictation. Consultations: None as of this dictation. Initial clinical Impression:  1. Viral syndrome  2. COVID-19 exposure  3. Generalized weakness  4. Poor appetite    Disposition referral (if applicable):  Pending  Disposition medications (if applicable):  New Prescriptions    No medications on file     ED Provider Disposition Time  DISPOSITION  Pending    Comment: Please note this report has been produced using speech recognition software and may contain errors related to that system including errors in grammar, punctuation, and spelling, as well as words and phrases that may be inappropriate. Efforts were made to edit the dictations.         Luiza Briggs MD  12/06/20 5548

## 2020-12-06 LAB
ALBUMIN SERPL-MCNC: 4.2 G/DL
ALP BLD-CCNC: 84 U/L
ALT SERPL-CCNC: 11 U/L
ANION GAP SERPL CALCULATED.3IONS-SCNC: NORMAL MMOL/L
AST SERPL-CCNC: 20 U/L
BASOPHILS ABSOLUTE: 0 /ΜL
BASOPHILS RELATIVE PERCENT: 0.2 %
BILIRUB SERPL-MCNC: 0.4 MG/DL (ref 0.1–1.4)
BUN BLDV-MCNC: 11 MG/DL
CALCIUM SERPL-MCNC: 8.8 MG/DL
CHLORIDE BLD-SCNC: 104 MMOL/L
CO2: 27 MMOL/L
CREAT SERPL-MCNC: 0.7 MG/DL
EOSINOPHILS ABSOLUTE: 0 /ΜL
EOSINOPHILS RELATIVE PERCENT: 0.3 %
GFR CALCULATED: 80
GLUCOSE BLD-MCNC: 112 MG/DL
HCT VFR BLD CALC: 39.1 % (ref 36–46)
HEMOGLOBIN: 13.4 G/DL (ref 12–16)
LYMPHOCYTES ABSOLUTE: 1.1 /ΜL
LYMPHOCYTES RELATIVE PERCENT: 17.1 %
MAGNESIUM: 2.2 MG/DL
MCH RBC QN AUTO: 30.5 PG
MCHC RBC AUTO-ENTMCNC: 34.1 G/DL
MCV RBC AUTO: 89.3 FL
MONOCYTES ABSOLUTE: 0.7 /ΜL
MONOCYTES RELATIVE PERCENT: 11 %
NEUTROPHILS ABSOLUTE: 4.5 /ΜL
NEUTROPHILS RELATIVE PERCENT: 71.4 %
PLATELET # BLD: 200 K/ΜL
PMV BLD AUTO: NORMAL FL
POTASSIUM SERPL-SCNC: 3.8 MMOL/L
RBC # BLD: 4.38 10^6/ΜL
SODIUM BLD-SCNC: 141 MMOL/L
TOTAL PROTEIN: 6.5
WBC # BLD: 6.4 10^3/ML

## 2020-12-06 PROCEDURE — 93010 ELECTROCARDIOGRAM REPORT: CPT | Performed by: INTERNAL MEDICINE

## 2020-12-06 NOTE — ED NOTES
Patient given AVS, discharge instructions and prescriptions. Verbalizes understanding no further needs identified at this time.      Alvaro Martin RN  12/05/20 0325

## 2020-12-06 NOTE — ED PROVIDER NOTES
ADDENDUM:    Care of the patient was assumed  from Dr. Erwin Coles   I have reviewed the notes, assessments, and/or procedures performed, I concur with her/his documentation on Jaiden Jim. I reviewed the medical record and evaluated the patient. ED COURSE/MDM:  Laboratory and imaging data were reviewed and care plan was arranged with the patient(see separate lab/imaging reports). RADIOLOGY:  Already resulted studies have been reviewed. XR ACUTE ABD SERIES CHEST 1 VW   Final Result   1. Subtle airspace opacity at the periphery of the left lung base and likely   at the periphery of the right lung base may reflect underlying infiltrate. 2. Nonspecific bowel gas pattern with no evidence for obstruction.              Labs Reviewed   CBC WITH AUTO DIFFERENTIAL - Abnormal; Notable for the following components:       Result Value    Lymphocytes % 23.4 (*)     Monocytes % 12.4 (*)     All other components within normal limits   COMPREHENSIVE METABOLIC PANEL W/ REFLEX TO MG FOR LOW K - Abnormal; Notable for the following components:    Glucose 113 (*)     ALT 9 (*)     All other components within normal limits   APTT - Abnormal; Notable for the following components:    aPTT 40.4 (*)     All other components within normal limits   COVID-19 - Abnormal; Notable for the following components:    SARS-CoV-2, NAAT DETECTED (*)     All other components within normal limits   LIPASE   TROPONIN   BRAIN NATRIURETIC PEPTIDE   LACTIC ACID, PLASMA   PROTIME-INR   URINE RT REFLEX TO CULTURE       Medications   0.9 % sodium chloride bolus (0 mLs Intravenous Stopped 12/5/20 2002)   dexamethasone (PF) (DECADRON) injection 8 mg (8 mg Intravenous Given 12/5/20 2028)   azithromycin (ZITHROMAX) tablet 500 mg (500 mg Oral Given 12/5/20 2028)       Vitals:    12/05/20 1729 12/05/20 2031   BP: 119/69 130/77   Pulse: 98 91   Resp: 24 16   Temp: 99.8 °F (37.7 °C)    TempSrc: Oral    SpO2: 94% 95%   Weight: 175 lb (79.4 kg)    Height: 5' 4\" (1.626 m)        XR ACUTE ABD SERIES CHEST 1 VW   Final Result   1. Subtle airspace opacity at the periphery of the left lung base and likely   at the periphery of the right lung base may reflect underlying infiltrate. 2. Nonspecific bowel gas pattern with no evidence for obstruction. Labs Reviewed   CBC WITH AUTO DIFFERENTIAL - Abnormal; Notable for the following components:       Result Value    Lymphocytes % 23.4 (*)     Monocytes % 12.4 (*)     All other components within normal limits   COMPREHENSIVE METABOLIC PANEL W/ REFLEX TO MG FOR LOW K - Abnormal; Notable for the following components:    Glucose 113 (*)     ALT 9 (*)     All other components within normal limits   APTT - Abnormal; Notable for the following components:    aPTT 40.4 (*)     All other components within normal limits   COVID-19 - Abnormal; Notable for the following components:    SARS-CoV-2, NAAT DETECTED (*)     All other components within normal limits   LIPASE   TROPONIN   BRAIN NATRIURETIC PEPTIDE   LACTIC ACID, PLASMA   PROTIME-INR   URINE RT REFLEX TO CULTURE     I discussed the nature and purpose, risks and benefits, as well as, the alternatives with Gene Terrazas. Gene Terrazas was given the time and opportunity to ask questions and consider their options, and after the discussion, Gene Terrazas decided to refuse my recommended course of medical treatment. I informed Gene Terrazas that refusal could lead to, but was not limited to, death, permanent disability, or severe pain. My typical dicussion, presentation, and considerations for this patients' chief complaint, diagnosis, differential diagnosis, medications, medication use,  medication safety and medication interactions have been explained and outlined to this patient for this patient encounter.  I have stressed need for follow up and reexamination for this encounter and or return to the emergency department if any changes or any concern and the need for this follow up. If present, I asked the relatives or significant others of Glen Evans to also participate with the discussions ( This was made with the patients prior approval and right to privacy). Prior to refusing, their nurse and I determined and agreed that Glen Evans had the capacity to make this decision and understood the consequences of that decision. After refusal, I made every reasonable opportunity to treat Glen Evans to the best of my ability and applying best medical choices where required for this patient. Overall this patient exhibits ability to communicate their choice of refusal and understand the medical relevance. The patient appreciates the medical consequences of the situation as well as ability to reason about the treatment options and choices and consequences I have outlined for this condition. They agree and understand that their refusal to treat does not reflect poorly and they may return at any time. . In all this patient has the capacity to make this choice for their care    FINAL IMPRESSION:  1. Fatigue, unspecified type    2. COVID-19 virus infection    3.  Multifocal pneumonia        Discharge Medication List as of 12/5/2020  8:36 PM      START taking these medications    Details   azithromycin (ZITHROMAX) 250 MG tablet Take 1 tablet by mouth See Admin Instructions for 5 days 500mg on day 1 followed by 250mg on days 2 - 5, Disp-6 tablet,R-0Print      albuterol sulfate HFA (VENTOLIN HFA) 108 (90 Base) MCG/ACT inhaler Inhale 2 puffs into the lungs 4 times daily as needed for Wheezing, Disp-1 Inhaler,R-0Print      benzonatate (TESSALON) 200 MG capsule Take 1 capsule by mouth 3 times daily as needed for Cough, Disp-15 capsule,R-0Print                      Maximiliano Simmons DO  12/05/20 7179

## 2020-12-08 ENCOUNTER — CARE COORDINATION (OUTPATIENT)
Dept: CARE COORDINATION | Age: 85
End: 2020-12-08

## 2020-12-08 NOTE — CARE COORDINATION
Patient contacted regarding COVID-19 exposure. Discussed COVID-19 related testing which was available at this time. Test results were positive. Patient informed of results, if available? Yes    Pt reports that she is feeling somewhat better today but still has a headache, weakness, anxiety and loose stools as soon as she eats something. Pt reports DIL and dtr are assisting her. She reports that her pulse ox at resting runs about 92-93% and when ambulating drops to 88-89% but quickly increases with rest back to low 90s. Care Transition Nurse/ Ambulatory Care Manager contacted the patient by telephone to perform post discharge assessment. Call within 2 business days of discharge: Yes. Verified name and  with patient as identifiers. Provided introduction to self, and explanation of the CTN/ACM role, and reason for call due to risk factors for infection and/or exposure to COVID-19. Symptoms reviewed with patient who verbalized the following symptoms: fatigue, cough, diarrhea and anorexia. Due to no new or worsening symptoms encounter was not routed to provider for escalation. Discussed follow-up appointments. If no appointment was previously scheduled, appointment scheduling offered: Yes  [facility] follow up appointment(s):   Future Appointments   Date Time Provider Nichelle Brooke   2021  2:40 PM Marianna Bentley MD [facility]     Non-[facility] follow up appointment(s): Pt states that her phys, Dr Jack Galindo has been calling to speak with her daily. Non-face-to-face services provided:  Obtained and reviewed discharge summary and/or continuity of care documents     Advance Care Planning:   Does patient have an Advance Directive:  patient declined education. Patient has following risk factors of: CAD.  CTN/ACM reviewed discharge instructions, medical action plan and red flags such as increased shortness of breath, increasing fever and signs of decompensation with patient who verbalized understanding. Discussed exposure protocols and quarantine with CDC Guidelines What to do if you are sick with coronavirus disease 2019.  Patient was given an opportunity for questions and concerns. The patient agrees to contact the [facility] exposure line [phone], [facility]: ([phone]) and PCP office for questions related to their healthcare. CTN/ACM provided contact information for future needs. Reviewed and educated patient on any new and changed medications related to discharge diagnosis     Patient/family/caregiver given information for GetWell Loop and agrees to enroll yes  Patient's preferred e-mail: [email or web address]  Patient's preferred phone number: [phone]  Based on Loop alert triggers, patient will be contacted by nurse care manager for worsening symptoms. Pt will be further monitored by COVID Loop Team based on severity of symptoms and risk factors.

## 2020-12-09 LAB
EKG ATRIAL RATE: 84 BPM
EKG DIAGNOSIS: NORMAL
EKG P AXIS: 19 DEGREES
EKG P-R INTERVAL: 130 MS
EKG Q-T INTERVAL: 372 MS
EKG QRS DURATION: 80 MS
EKG QTC CALCULATION (BAZETT): 439 MS
EKG R AXIS: -7 DEGREES
EKG T AXIS: 13 DEGREES
EKG VENTRICULAR RATE: 84 BPM

## 2021-01-06 ENCOUNTER — OFFICE VISIT (OUTPATIENT)
Dept: CARDIOLOGY CLINIC | Age: 86
End: 2021-01-06
Payer: MEDICARE

## 2021-01-06 ENCOUNTER — NURSE ONLY (OUTPATIENT)
Dept: CARDIOLOGY CLINIC | Age: 86
End: 2021-01-06
Payer: MEDICARE

## 2021-01-06 VITALS
SYSTOLIC BLOOD PRESSURE: 120 MMHG | DIASTOLIC BLOOD PRESSURE: 70 MMHG | WEIGHT: 172 LBS | BODY MASS INDEX: 29.37 KG/M2 | HEIGHT: 64 IN | HEART RATE: 88 BPM

## 2021-01-06 DIAGNOSIS — R00.2 HEART PALPITATIONS: ICD-10-CM

## 2021-01-06 DIAGNOSIS — E78.00 PURE HYPERCHOLESTEROLEMIA: ICD-10-CM

## 2021-01-06 DIAGNOSIS — F41.9 ANXIETY: ICD-10-CM

## 2021-01-06 DIAGNOSIS — R00.2 PALPITATIONS: Primary | ICD-10-CM

## 2021-01-06 DIAGNOSIS — I25.10 CORONARY ARTERY DISEASE INVOLVING NATIVE CORONARY ARTERY OF NATIVE HEART WITHOUT ANGINA PECTORIS: Primary | ICD-10-CM

## 2021-01-06 PROCEDURE — G8484 FLU IMMUNIZE NO ADMIN: HCPCS | Performed by: INTERNAL MEDICINE

## 2021-01-06 PROCEDURE — G8417 CALC BMI ABV UP PARAM F/U: HCPCS | Performed by: INTERNAL MEDICINE

## 2021-01-06 PROCEDURE — 4040F PNEUMOC VAC/ADMIN/RCVD: CPT | Performed by: INTERNAL MEDICINE

## 2021-01-06 PROCEDURE — 1036F TOBACCO NON-USER: CPT | Performed by: INTERNAL MEDICINE

## 2021-01-06 PROCEDURE — 93228 REMOTE 30 DAY ECG REV/REPORT: CPT | Performed by: INTERNAL MEDICINE

## 2021-01-06 PROCEDURE — 99214 OFFICE O/P EST MOD 30 MIN: CPT | Performed by: INTERNAL MEDICINE

## 2021-01-06 PROCEDURE — 1123F ACP DISCUSS/DSCN MKR DOCD: CPT | Performed by: INTERNAL MEDICINE

## 2021-01-06 PROCEDURE — 1090F PRES/ABSN URINE INCON ASSESS: CPT | Performed by: INTERNAL MEDICINE

## 2021-01-06 PROCEDURE — G8399 PT W/DXA RESULTS DOCUMENT: HCPCS | Performed by: INTERNAL MEDICINE

## 2021-01-06 PROCEDURE — G8427 DOCREV CUR MEDS BY ELIG CLIN: HCPCS | Performed by: INTERNAL MEDICINE

## 2021-01-06 NOTE — ASSESSMENT & PLAN NOTE
Is a new symptom since COVID-19 infection. We will obtain a 24-hour Holter monitor and echocardiogram to evaluate it further.

## 2021-01-06 NOTE — PROGRESS NOTES
Marc Roe (:  1935) is a 81 y.o. female,     Patient is here for further evaluation and follow up for 4 palpitations and heart rate is staying in 90s and 100 at rest which is bothersome. She had COVID-19 positive on  and she is feeling extremely tired and fatigued. She had no shortness of breath but she had cough and low-grade temperature treated at home. She feels extremely tired and fatigue and does not feel right since that happened and heart rate at rest in the 90s is bothersome. Denies any chest pains except twinges occasionally. She is physically not very active. She is never a smoker. She is not exercising. Her medications are reviewed and emergency room visit on  and all the work-up was reviewed and discussed with her. Allergies   Allergen Reactions    Lescol      GI Symptoms    Lipitor      Myalgia    Pcn [Penicillins]     Zocor [Simvastatin]      myalgia     Prior to Admission medications    Medication Sig Start Date End Date Taking?  Authorizing Provider   topiramate (TOPAMAX) 25 MG tablet Take 25 mg by mouth 2 times daily   Yes Historical Provider, MD   albuterol sulfate HFA (VENTOLIN HFA) 108 (90 Base) MCG/ACT inhaler Inhale 2 puffs into the lungs 4 times daily as needed for Wheezing 20  Yes Silvana Prescott DO   rosuvastatin (CRESTOR) 5 MG tablet Take 5 mg by mouth daily   Yes Historical Provider, MD   Probiotic Product (PROBIOTIC-10 PO) Take by mouth   Yes Historical Provider, MD   Multiple Vitamins-Minerals (PRESERVISION AREDS) CAPS Take 1 capsule by mouth 2 times daily   Yes Historical Provider, MD   celecoxib (CELEBREX) 200 MG capsule Take 200 mg by mouth daily   Yes Historical Provider, MD   Cholecalciferol (VITAMIN D-3 PO) Take by mouth daily   Yes Historical Provider, MD   clopidogrel (PLAVIX) 75 MG tablet Take 1 tablet by mouth daily 10/7/19 10/6/20  Orlando Nash MD       Past Medical History:   Diagnosis Date    Anxiety  Coronary artery disease involving native coronary artery of native heart without angina pectoris 3/20/2018    Ectatic vessel with sluggish blood flow 2004 cath    Essential tremor     H/O cardiac catheterization 1993, 2004    Ectasia of cornaries, normal EF    H/O cardiovascular stress test 4/29/14    Stress Cardiolite. Normal perfusion in the distribution of all coronaries, normal LV size and function, EF 70%.  H/O echocardiogram 4/29/14    EF 50-55%, mild TR, doppler flow pattern is suggestive of impaired LV relaxation    H/O exercise stress test 5/93    Heart murmur     Heart palpitations     Hyperlipidemia     MVP (mitral valve prolapse)     Obesity       Vitals:    01/06/21 1506   BP: 120/70   Pulse: 88   Weight: 172 lb (78 kg)   Height: 5' 4\" (1.626 m)      Body mass index is 29.52 kg/m². Wt Readings from Last 3 Encounters:   01/06/21 172 lb (78 kg)   12/05/20 175 lb (79.4 kg)   10/07/19 179 lb (81.2 kg)     Constitutional:  Patient is normally built and nourished female in no apparent distress. Eyes:  Pupils are equal.  She wears glasses. Wearing a face mask  NECK: No JVP or thyromegaly  Cardiovascular: Auscultation: Normal S1 and S2. No murmurs or gallops noted. Carotids are negative for bruits. Abdominal aorta is nonpalpable. No epigastric bruit noted. Peripheral pulses: Pedal pulses are 2+ in both feet. Respiratory:  Respiratory effort is Breath sounds are clear to auscultation. Extremities:  No edema, clubbing,  Cyanosis, petechiae. SKIN: Warm and well perfused, no pallor or cyanosis  Abdomen:  No masses or tenderness. No organomegaly noted. Musculoskeletal:  No spinal deformities noted. Gait is normal. Muscle strength is normal. Fine tremors. Neurologic:  Oriented to time, place, and person and non-anxious. No focal neurological deficit noted. Bilateral upper extremity tremors noted. Psychiatric: Normal mood and effect. Pertinent records reviewed and discussed with patient and results are as follow:    Chest Xray 12/5/2020 reported  1. Subtle airspace opacity at the periphery of the left lung base and likely   at the periphery of the right lung base may reflect underlying infiltrate. 2. Nonspecific bowel gas pattern with no evidence for obstruction. Lab Results   Component Value Date    WBC 6.4 12/06/2020    HGB 13.4 12/06/2020    HCT 39.1 12/06/2020     12/06/2020     Lab Results   Component Value Date    CHOL 171 07/15/2020    TRIG 150 07/15/2020    HDL 69 07/15/2020    LDLCALC 72 07/15/2020     Lab Results   Component Value Date    BUN 11 12/06/2020    CREATININE 0.7 12/06/2020     12/06/2020    K 3.8 12/06/2020     Lab Results   Component Value Date    INR 1.10 12/05/2020     ASSESSMENT/PLAN:    1. 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 Continue aggressive risk factor modification for primary prevention. 2. Heart palpitations  Assessment & Plan:  Is a new symptom since COVID-19 infection. We will obtain a 24-hour Holter monitor and echocardiogram to evaluate it further. Orders:  -     ECHO Complete 2D W Doppler W Color; Future  -     Holter Monitor 24 Hour; Future  3. Pure hypercholesterolemia  Assessment & Plan:  Well-controlled with last LDL 72. Continue current statin therapy. 4. Anxiety  Assessment & Plan:  She is anxious and nervous with everything going around with pandemic. As relevant questions and wants to know if she should take vaccine despite having infection recently. I told her the CDC recommendations. She will think about it. Continue current medications and walk for exercise. 24 hour Holter and Echo to evaluate post Covid-19 symptoms. Follow-up in 6 months with ECGsooner if needed. An electronic signature was used to authenticate this note.     --Connor Apley, MD

## 2021-01-06 NOTE — ASSESSMENT & PLAN NOTE
She is anxious and nervous with everything going around with pandemic. As relevant questions and wants to know if she should take vaccine despite having infection recently. I told her the CDC recommendations. She will think about it.

## 2021-01-14 ENCOUNTER — PROCEDURE VISIT (OUTPATIENT)
Dept: CARDIOLOGY CLINIC | Age: 86
End: 2021-01-14
Payer: MEDICARE

## 2021-01-14 DIAGNOSIS — R00.2 HEART PALPITATIONS: ICD-10-CM

## 2021-01-14 LAB
LV EF: 58 %
LVEF MODALITY: NORMAL

## 2021-01-14 PROCEDURE — 93306 TTE W/DOPPLER COMPLETE: CPT | Performed by: INTERNAL MEDICINE

## 2021-01-18 ENCOUNTER — TELEPHONE (OUTPATIENT)
Dept: CARDIOLOGY CLINIC | Age: 86
End: 2021-01-18

## 2021-01-18 NOTE — TELEPHONE ENCOUNTER
Patient given holter monitor results. Normal sinus rhythm with no clinically significant arrhythmias.  Symptoms are reported during normal rate and rhythm and during mild degree of sinus tachycardia.

## 2021-01-19 ENCOUNTER — TELEPHONE (OUTPATIENT)
Dept: CARDIOLOGY CLINIC | Age: 86
End: 2021-01-19

## 2021-02-09 RX ORDER — CLOPIDOGREL BISULFATE 75 MG/1
75 TABLET ORAL DAILY
Qty: 90 TABLET | Refills: 3 | Status: SHIPPED | OUTPATIENT
Start: 2021-02-09 | End: 2022-07-08 | Stop reason: SDUPTHER

## 2021-05-06 ENCOUNTER — HOSPITAL ENCOUNTER (OUTPATIENT)
Age: 86
Discharge: HOME | End: 2021-05-06
Payer: MEDICARE

## 2021-05-06 ENCOUNTER — HOSPITAL ENCOUNTER (OUTPATIENT)
Dept: GENERAL RADIOLOGY | Age: 86
Discharge: HOME | End: 2021-05-06
Payer: MEDICARE

## 2021-05-06 DIAGNOSIS — R06.02 BREATH SHORTNESS: ICD-10-CM

## 2021-05-06 DIAGNOSIS — R53.83 MALAISE AND FATIGUE: ICD-10-CM

## 2021-05-06 DIAGNOSIS — R05.9 COUGH: ICD-10-CM

## 2021-05-06 DIAGNOSIS — R53.81 MALAISE AND FATIGUE: ICD-10-CM

## 2021-05-06 LAB
ALBUMIN SERPL-MCNC: 4.3 GM/DL (ref 3.4–5)
ALP BLD-CCNC: 70 IU/L (ref 40–129)
ALT SERPL-CCNC: 5 U/L (ref 10–40)
ANION GAP SERPL CALCULATED.3IONS-SCNC: 10 MMOL/L (ref 4–16)
AST SERPL-CCNC: 17 IU/L (ref 15–37)
BASOPHILS ABSOLUTE: 0 K/CU MM
BASOPHILS RELATIVE PERCENT: 0.5 % (ref 0–1)
BILIRUB SERPL-MCNC: 0.5 MG/DL (ref 0–1)
BUN BLDV-MCNC: 18 MG/DL (ref 6–23)
CALCIUM SERPL-MCNC: 9.3 MG/DL (ref 8.3–10.6)
CHLORIDE BLD-SCNC: 104 MMOL/L (ref 99–110)
CO2: 24 MMOL/L (ref 21–32)
CREAT SERPL-MCNC: 0.8 MG/DL (ref 0.6–1.1)
DIFFERENTIAL TYPE: ABNORMAL
EOSINOPHILS ABSOLUTE: 0.2 K/CU MM
EOSINOPHILS RELATIVE PERCENT: 3.3 % (ref 0–3)
GFR AFRICAN AMERICAN: >60 ML/MIN/1.73M2
GFR NON-AFRICAN AMERICAN: >60 ML/MIN/1.73M2
GLUCOSE FASTING: 101 MG/DL (ref 70–99)
HCT VFR BLD CALC: 42.5 % (ref 37–47)
HEMOGLOBIN: 13.8 GM/DL (ref 12.5–16)
IMMATURE NEUTROPHIL %: 0.2 % (ref 0–0.43)
LYMPHOCYTES ABSOLUTE: 1.7 K/CU MM
LYMPHOCYTES RELATIVE PERCENT: 27.2 % (ref 24–44)
MCH RBC QN AUTO: 30.1 PG (ref 27–31)
MCHC RBC AUTO-ENTMCNC: 32.5 % (ref 32–36)
MCV RBC AUTO: 92.6 FL (ref 78–100)
MONOCYTES ABSOLUTE: 0.7 K/CU MM
MONOCYTES RELATIVE PERCENT: 10.2 % (ref 0–4)
PDW BLD-RTO: 12.6 % (ref 11.7–14.9)
PLATELET # BLD: 203 K/CU MM (ref 140–440)
PMV BLD AUTO: 9.4 FL (ref 7.5–11.1)
POTASSIUM SERPL-SCNC: 4.1 MMOL/L (ref 3.5–5.1)
PRO-BNP: 156.8 PG/ML
RBC # BLD: 4.59 M/CU MM (ref 4.2–5.4)
SEGMENTED NEUTROPHILS ABSOLUTE COUNT: 3.7 K/CU MM
SEGMENTED NEUTROPHILS RELATIVE PERCENT: 58.6 % (ref 36–66)
SODIUM BLD-SCNC: 138 MMOL/L (ref 135–145)
TOTAL IMMATURE NEUTOROPHIL: 0.01 K/CU MM
TOTAL PROTEIN: 7.1 GM/DL (ref 6.4–8.2)
WBC # BLD: 6.4 K/CU MM (ref 4–10.5)

## 2021-05-06 PROCEDURE — 36415 COLL VENOUS BLD VENIPUNCTURE: CPT

## 2021-05-06 PROCEDURE — 80053 COMPREHEN METABOLIC PANEL: CPT

## 2021-05-06 PROCEDURE — 85025 COMPLETE CBC W/AUTO DIFF WBC: CPT

## 2021-05-06 PROCEDURE — 83880 ASSAY OF NATRIURETIC PEPTIDE: CPT

## 2021-05-06 PROCEDURE — 71046 X-RAY EXAM CHEST 2 VIEWS: CPT

## 2021-05-19 ENCOUNTER — OFFICE VISIT (OUTPATIENT)
Dept: CARDIOLOGY CLINIC | Age: 86
End: 2021-05-19
Payer: MEDICARE

## 2021-05-19 VITALS
WEIGHT: 168 LBS | HEIGHT: 64 IN | HEART RATE: 88 BPM | SYSTOLIC BLOOD PRESSURE: 120 MMHG | DIASTOLIC BLOOD PRESSURE: 72 MMHG | BODY MASS INDEX: 28.68 KG/M2

## 2021-05-19 DIAGNOSIS — I20.8 OTHER FORMS OF ANGINA PECTORIS (HCC): ICD-10-CM

## 2021-05-19 DIAGNOSIS — F41.9 ANXIETY: ICD-10-CM

## 2021-05-19 DIAGNOSIS — R00.2 PALPITATIONS: ICD-10-CM

## 2021-05-19 DIAGNOSIS — I25.10 CORONARY ARTERY DISEASE INVOLVING NATIVE CORONARY ARTERY OF NATIVE HEART WITHOUT ANGINA PECTORIS: Primary | ICD-10-CM

## 2021-05-19 DIAGNOSIS — E78.00 PURE HYPERCHOLESTEROLEMIA: ICD-10-CM

## 2021-05-19 PROCEDURE — 1123F ACP DISCUSS/DSCN MKR DOCD: CPT | Performed by: INTERNAL MEDICINE

## 2021-05-19 PROCEDURE — G8417 CALC BMI ABV UP PARAM F/U: HCPCS | Performed by: INTERNAL MEDICINE

## 2021-05-19 PROCEDURE — 4040F PNEUMOC VAC/ADMIN/RCVD: CPT | Performed by: INTERNAL MEDICINE

## 2021-05-19 PROCEDURE — G8427 DOCREV CUR MEDS BY ELIG CLIN: HCPCS | Performed by: INTERNAL MEDICINE

## 2021-05-19 PROCEDURE — 1036F TOBACCO NON-USER: CPT | Performed by: INTERNAL MEDICINE

## 2021-05-19 PROCEDURE — 99214 OFFICE O/P EST MOD 30 MIN: CPT | Performed by: INTERNAL MEDICINE

## 2021-05-19 PROCEDURE — 93000 ELECTROCARDIOGRAM COMPLETE: CPT | Performed by: INTERNAL MEDICINE

## 2021-05-19 PROCEDURE — G8399 PT W/DXA RESULTS DOCUMENT: HCPCS | Performed by: INTERNAL MEDICINE

## 2021-05-19 PROCEDURE — 1090F PRES/ABSN URINE INCON ASSESS: CPT | Performed by: INTERNAL MEDICINE

## 2021-05-19 RX ORDER — CYANOCOBALAMIN (VITAMIN B-12) 500 MCG
1000 TABLET ORAL DAILY
COMMUNITY

## 2021-05-19 RX ORDER — METOPROLOL SUCCINATE 25 MG/1
25 TABLET, EXTENDED RELEASE ORAL DAILY
Qty: 30 TABLET | Refills: 5 | Status: SHIPPED
Start: 2021-05-19 | End: 2021-11-22 | Stop reason: ALTCHOICE

## 2021-05-19 NOTE — ASSESSMENT & PLAN NOTE
If she continues to have the symptoms of palpitations and anxiety she needs to follow-up with PCP to consider antianxiety medications. Her cardiac work-up is very much suggestive of noncardiac etiology of her symptoms.

## 2021-05-19 NOTE — PROGRESS NOTES
Collette Borja (:  1935) is a 81 y.o. female,     Chief Complaint   Patient presents with    Coronary Artery Disease     Patient here today complaining of general weakness and jaw pain x 2 days. Patient is a non-smoker that walks for exercise but does not have a regular form of exercise. Patient denies dizziness and edema.  Hyperlipidemia     Patient had been taking Crestor 5 mg daily, but her PCP stopped it, thinking the side effects could be adding to her current symptoms of weakness.  Chest Pain     Patient complains of occasional chest tightness that lasts a few seconds x 2 to 3 months.  Shortness of Breath     Patient complains of ongoing, occasional, unchanged, shortness of breath on exertion.  Palpitations     Patient complains of ongoing, occasional, unchanged, skipping of her heart. Patient is here for follow up for fatigue and palpitations as post COVID-19 symptoms last seen in 2021. She feels her fatigue is more intermittent than continuous now and palpitations are slightly better. She was told by PCP to stop Crestor to see if that helps her fatigue and she stopped taking it 2 weeks ago without any significant change in the way she was feeling. She also stopped taking Topamax which is caused her tremors to increase. She has decided to go back on both these medications now. She had some jaw pain the other day and we offered further evaluation noninvasively which she declines. She has not decided to take vaccines for COVID-19 yet. Echocardiogram and Holter results are reviewed and discussed with her in detail. Allergies   Allergen Reactions    Lescol      GI Symptoms    Lipitor      Myalgia    Pcn [Penicillins]     Zocor [Simvastatin]      myalgia     Prior to Admission medications    Medication Sig Start Date End Date Taking?  Authorizing Provider   Cyanocobalamin (VITAMIN B 12) 500 MCG TABS Take 1,000 mg by mouth daily   Yes Historical Provider, MD   Omega-3 Fatty Acids (OMEGA-3 FISH OIL PO) Take by mouth daily   Yes Historical Provider, MD   metoprolol succinate (TOPROL XL) 25 MG extended release tablet Take 1 tablet by mouth daily 5/19/21  Yes Carol Clifton MD   clopidogrel (PLAVIX) 75 MG tablet Take 1 tablet by mouth daily 2/9/21 2/9/22 Yes Carol Clifton MD   albuterol sulfate HFA (VENTOLIN HFA) 108 (90 Base) MCG/ACT inhaler Inhale 2 puffs into the lungs 4 times daily as needed for Wheezing 12/5/20  Yes [address], DO   Probiotic Product (PROBIOTIC-10 PO) Take by mouth   Yes Historical Provider, MD   Multiple Vitamins-Minerals (PRESERVISION AREDS) CAPS Take 1 capsule by mouth 2 times daily   Yes Historical Provider, MD   celecoxib (CELEBREX) 200 MG capsule Take 200 mg by mouth daily   Yes Historical Provider, MD   topiramate (TOPAMAX) 25 MG tablet Take 25 mg by mouth 2 times daily  Patient not taking: Reported on 5/19/2021    Historical Provider, MD   rosuvastatin (CRESTOR) 5 MG tablet Take 5 mg by mouth daily  Patient not taking: Reported on 5/19/2021    Historical Provider, MD   Cholecalciferol (VITAMIN D-3 PO) Take by mouth daily  Patient not taking: Reported on 5/19/2021    Historical Provider, MD     Past Medical History:   Diagnosis Date    Anxiety     Coronary artery disease involving native coronary artery of native heart without angina pectoris 03/20/2018    Ectatic vessel with sluggish blood flow 2004 cath    Essential tremor     H/O cardiac catheterization 1993, 2004    Ectasia of cornaries, normal EF    H/O cardiovascular stress test 04/29/2014    Stress Cardiolite. Normal perfusion in the distribution of all coronaries, normal LV size and function, EF 70%.  H/O echocardiogram 04/29/2014    EF 50-55%, mild TR, doppler flow pattern is suggestive of impaired LV relaxation    H/O echocardiogram 01/14/2021    EF 55-60%. E/A reversal suggests abnormal LV relaxation.  Mild MR.    H/O exercise stress test 05/1993    Heart murmur     Heart palpitations     Hyperlipidemia     MVP (mitral valve prolapse)     Obesity       Vitals:    05/19/21 0825   BP: 120/72   Site: Left Upper Arm   Position: Sitting   Cuff Size: Medium Adult   Pulse: 88   Weight: 168 lb (76.2 kg)   Height: 5' 4\" (1.626 m)      Body mass index is 28.84 kg/m². Wt Readings from Last 3 Encounters:   05/19/21 168 lb (76.2 kg)   01/06/21 172 lb (78 kg)   12/05/20 175 lb (79.4 kg)     Constitutional:  Patient is normally built and nourished female in no apparent distress. She lost 4 pounds since last visit 3 months ago. Eyes:  Pupils are equal.  She wears glasses. Wearing a face mask  NECK: No JVP or thyromegaly  Cardiovascular:  Auscultation: Normal S1 and S2.  No murmurs or gallops noted. Carotids are negative for bruits.  Abdominal aorta is nonpalpable.  No epigastric bruit noted. Peripheral pulses: Pedal pulses are 2+ in both feet. Respiratory:  Respiratory effort is Breath sounds are clear to auscultation. Extremities:  No edema, clubbing,  Cyanosis, petechiae. SKIN: Warm and well perfused, no pallor or cyanosis  Abdomen:  No masses or tenderness. No organomegaly noted. Musculoskeletal:  No spinal deformities noted.  Gait is normal. Muscle strength is normal. Fine tremors. Neurologic:  Oriented to time, place, and person and non-anxious. No focal neurological deficit noted.  Bilateral upper extremity tremors noted. Psychiatric: Normal mood and effect.     EKG today shows sinus rhythm 85 bpm with nonspecific ST abnormalities. Pertinent records reviewed and discussed with patient and results are as follow:  Continuous cardiac monitoring is done for 3 days to evaluate palpitations. Underlying rhythm is normal sinus at average rate of 86 bpm.  Lowest rate was 57 bpm at 5:37 AM and the maximum rate was 170 bpm at 5:33 PM.  Rare isolated 139 PVCs and 171 PACs are noted without any complex arrhythmias.   Patient reported palpitations during sinus tachycardia and during normal rate and rhythm also. She reported fatigue during sinus rhythm rate was 89 bpm.     Conclusion: Normal sinus rhythm with no clinically significant arrhythmias. Symptoms are reported during normal rate and rhythm and during mild degree of sinus tachycardia. Echocardiogram done on January 14, 2021 reported  Left ventricular function is normal, EF is estimated at 55-60%. E/A reversal suggests abnormal LV relaxation. No regional wall motion abnormalities were detected. Mild mitral regurgitation is present. RVSP is 18 mmHg. No evidence of pericardial effusion. Lab Results   Component Value Date    WBC 6.4 05/06/2021    HGB 13.8 05/06/2021    HCT 42.5 05/06/2021     05/06/2021     Lab Results   Component Value Date    CHOL 171 07/15/2020    TRIG 150 07/15/2020    HDL 69 07/15/2020    LDLCALC 72 07/15/2020     Lab Results   Component Value Date    BUN 18 05/06/2021    CREATININE 0.8 05/06/2021     05/06/2021    K 4.1 05/06/2021     Lab Results   Component Value Date    INR 1.10 12/05/2020     ASSESSMENT/PLAN:    1. 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 If jaw pain becomes recurrent and worse then we need to reevaluate her coronary artery disease status. Right now she does not believe that she has symptoms better not to go for further work-up. Orders:  -     EKG 12 lead; Standing  2. Pure hypercholesterolemia  Assessment & Plan:  Last LDL was 72. Resume Crestor. She does not tolerate high intensity statin therapy. 3. Palpitations  Assessment & Plan:  Try Toprol 25 mg daily if tolerated. Potential side effects are discussed. She is willing to try. She is counseled to monitor her heart rate and blood pressure closely. 4. Anxiety  Assessment & Plan:  If she continues to have the symptoms of palpitations and anxiety she needs to follow-up with PCP to consider antianxiety medications.   Her cardiac work-up is very much suggestive of noncardiac etiology of her symptoms. 5. Other forms of angina pectoris [facility])  Assessment & Plan:  She will call us back if her symptoms are recurrent for further work-up at this point she does not want any further testings. Continue aggressive risk factor modification for primary prevention. Resume Crestor and continue Plavix. Toprol XL 25 mg daily if tolerated. Monitor BP and heart rate closely. Walk regularly for exercise and follow up with PCP for anxiety. Primary prevention is the goal by aggressive risk modification, healthy and therapeutic life style changes for cardiovascular risk reduction. Various goals are discussed and questions answered. Follow-up in 6 months with EKGsooner if needed. An electronic signature was used to authenticate this note.     --Terry Alarcon MD

## 2021-05-19 NOTE — ASSESSMENT & PLAN NOTE
Try Toprol 25 mg daily if tolerated. Potential side effects are discussed. She is willing to try. She is counseled to monitor her heart rate and blood pressure closely.

## 2021-05-19 NOTE — PATIENT INSTRUCTIONS
**It is YOUR responsibilty to bring medication bottles and/or updated medication list to [address] [place] [address]. This will allow us to better serve you and all your healthcare needs**      Please be informed that if you contact our office outside of normal business hours the physician on call cannot help with any scheduling or rescheduling issues, procedure instruction questions or any type of medication issue. We advise you for any urgent/emergency that you go to the nearest emergency room! PLEASE CALL OUR OFFICE DURING NORMAL BUSINESS HOURS    Monday - Friday   8 am to 5 pm    [place]: [phone]    [place]: [phone]    [place]:  [phone]  Resume Crestor and continue Plavix. Toprol XL 25 mg daily if tolerated. Monitor BP and heart rate closely. Walk regularly for exercise and follow up with PCP for anxiety. Primary prevention is the goal by aggressive risk modification, healthy and therapeutic life style changes for cardiovascular risk reduction. Various goals are discussed and questions answered. Follow-up in 6 months with EKGsooner if needed.

## 2021-05-19 NOTE — ASSESSMENT & PLAN NOTE
Continue Plavix and lipid-lowering therapy. If jaw pain becomes recurrent and worse then we need to reevaluate her coronary artery disease status. Right now she does not believe that she has symptoms better not to go for further work-up.

## 2021-05-19 NOTE — LETTER
Dinora Louise  1935  [id number]    Have you had any Chest Pain that is not new? - Yes  If Yes DO EKG - How does it feel - Tightness   How long does the pain last - 3 minutes    How long have you been having the pain - Months   Did you take a NONE   And did it relieve the pain - it goes away on it's own       Have you had any Shortness of Breath - Yes  If Yes - When on exertion    Have you had any dizziness - No       Have you had any palpitations that are not new? - Yes  If Yes DO EKG - Do you feel your heart skipping  How long does it last - .3  seconds       Do you have any edema - swelling in No    If Yes - CHECK TO SEE IF THE EDEMA IS PITTING      When did you have your last labs drawn 2/2021 Lipids  Where did you have them done Dr. Sindy Buitrago  What doctor ordered Dr. Sindy Buitrago    If we do not have these labs you are retrieve these labs for these providers!     Do you have a surgery or procedure scheduled in the near future - No  If Yes- DO EKG

## 2021-07-02 ENCOUNTER — HOSPITAL ENCOUNTER (OUTPATIENT)
Dept: ULTRASOUND IMAGING | Age: 86
Discharge: HOME | End: 2021-07-02
Payer: MEDICARE

## 2021-07-02 DIAGNOSIS — R14.0 ABDOMINAL BLOATING: ICD-10-CM

## 2021-07-02 PROCEDURE — 76700 US EXAM ABDOM COMPLETE: CPT

## 2021-07-13 ENCOUNTER — HOSPITAL ENCOUNTER (OUTPATIENT)
Dept: MRI IMAGING | Age: 86
Discharge: HOME | End: 2021-07-13
Payer: MEDICARE

## 2021-07-13 DIAGNOSIS — K74.60 HEPATIC CIRRHOSIS, UNSPECIFIED HEPATIC CIRRHOSIS TYPE, UNSPECIFIED WHETHER ASCITES PRESENT (HCC): ICD-10-CM

## 2021-07-13 PROCEDURE — A9579 GAD-BASE MR CONTRAST NOS,1ML: HCPCS | Performed by: INTERNAL MEDICINE

## 2021-07-13 PROCEDURE — 6360000004 HC RX CONTRAST MEDICATION: Performed by: INTERNAL MEDICINE

## 2021-07-13 PROCEDURE — 74183 MRI ABD W/O CNTR FLWD CNTR: CPT

## 2021-07-13 RX ADMIN — GADOTERIDOL 10 ML: 279.3 INJECTION, SOLUTION INTRAVENOUS at 08:52

## 2021-07-26 ENCOUNTER — HOSPITAL ENCOUNTER (OUTPATIENT)
Dept: GENERAL RADIOLOGY | Age: 86
Discharge: HOME | End: 2021-07-26
Payer: MEDICARE

## 2021-07-26 ENCOUNTER — HOSPITAL ENCOUNTER (OUTPATIENT)
Age: 86
Discharge: HOME | End: 2021-07-26
Payer: MEDICARE

## 2021-07-26 DIAGNOSIS — M25.562 ACUTE PAIN OF LEFT KNEE: ICD-10-CM

## 2021-07-26 PROCEDURE — 73560 X-RAY EXAM OF KNEE 1 OR 2: CPT

## 2021-08-18 LAB
ALBUMIN SERPL-MCNC: 4.5 G/DL
ALP BLD-CCNC: 69 U/L
ALT SERPL-CCNC: 9 U/L
ANION GAP SERPL CALCULATED.3IONS-SCNC: NORMAL MMOL/L
AST SERPL-CCNC: 14 U/L
BILIRUB SERPL-MCNC: 0.4 MG/DL (ref 0.1–1.4)
BUN BLDV-MCNC: 15 MG/DL
CALCIUM SERPL-MCNC: 9.6 MG/DL
CHLORIDE BLD-SCNC: 103 MMOL/L
CHOLESTEROL, TOTAL: 239 MG/DL
CHOLESTEROL/HDL RATIO: ABNORMAL
CO2: NORMAL
CREAT SERPL-MCNC: 0.8 MG/DL
GFR CALCULATED: NORMAL
GLUCOSE BLD-MCNC: 94 MG/DL
HDLC SERPL-MCNC: 85 MG/DL (ref 35–70)
LDL CHOLESTEROL CALCULATED: 106 MG/DL (ref 0–160)
NONHDLC SERPL-MCNC: ABNORMAL MG/DL
POTASSIUM SERPL-SCNC: 4.1 MMOL/L
SODIUM BLD-SCNC: 140 MMOL/L
TOTAL PROTEIN: NORMAL
TRIGL SERPL-MCNC: 238 MG/DL
VLDLC SERPL CALC-MCNC: ABNORMAL MG/DL

## 2021-11-22 ENCOUNTER — OFFICE VISIT (OUTPATIENT)
Dept: CARDIOLOGY CLINIC | Age: 86
End: 2021-11-22
Payer: MEDICARE

## 2021-11-22 VITALS
WEIGHT: 161 LBS | DIASTOLIC BLOOD PRESSURE: 66 MMHG | HEART RATE: 94 BPM | HEIGHT: 65 IN | BODY MASS INDEX: 26.82 KG/M2 | SYSTOLIC BLOOD PRESSURE: 106 MMHG

## 2021-11-22 DIAGNOSIS — E78.00 PURE HYPERCHOLESTEROLEMIA: ICD-10-CM

## 2021-11-22 DIAGNOSIS — I25.10 CORONARY ARTERY DISEASE INVOLVING NATIVE CORONARY ARTERY OF NATIVE HEART WITHOUT ANGINA PECTORIS: Primary | ICD-10-CM

## 2021-11-22 PROCEDURE — 1123F ACP DISCUSS/DSCN MKR DOCD: CPT | Performed by: INTERNAL MEDICINE

## 2021-11-22 PROCEDURE — 99214 OFFICE O/P EST MOD 30 MIN: CPT | Performed by: INTERNAL MEDICINE

## 2021-11-22 PROCEDURE — 1090F PRES/ABSN URINE INCON ASSESS: CPT | Performed by: INTERNAL MEDICINE

## 2021-11-22 PROCEDURE — G8417 CALC BMI ABV UP PARAM F/U: HCPCS | Performed by: INTERNAL MEDICINE

## 2021-11-22 PROCEDURE — 93000 ELECTROCARDIOGRAM COMPLETE: CPT | Performed by: INTERNAL MEDICINE

## 2021-11-22 PROCEDURE — G8427 DOCREV CUR MEDS BY ELIG CLIN: HCPCS | Performed by: INTERNAL MEDICINE

## 2021-11-22 PROCEDURE — 1036F TOBACCO NON-USER: CPT | Performed by: INTERNAL MEDICINE

## 2021-11-22 PROCEDURE — G8484 FLU IMMUNIZE NO ADMIN: HCPCS | Performed by: INTERNAL MEDICINE

## 2021-11-22 PROCEDURE — 4040F PNEUMOC VAC/ADMIN/RCVD: CPT | Performed by: INTERNAL MEDICINE

## 2021-11-22 PROCEDURE — G8399 PT W/DXA RESULTS DOCUMENT: HCPCS | Performed by: INTERNAL MEDICINE

## 2021-11-22 NOTE — PROGRESS NOTES
Jenny Edwards (:  1935) is a 81 y.o. female,     Chief Complaint   Patient presents with    Coronary Artery Disease     OV for 6 month check. Pt denies any chest pain,dizziness,swelling to ankles, or palpitations. Pt gets SOB and fatigue alot since having Covid in Dec.    Palpitations    Heart Murmur     Patient is here for follow up for coronary disease hyper lipidemia and anxiety. She stopped taking Toprol as it was lowering her blood pressure and she also did not take Crestor and had a blood test done off of it and her LDL was 106. She reports poor appetite and she is losing weight. This is happened since she had COVID-19 infection December last year. She has decided not to take COVID-19 vaccination. Allergies   Allergen Reactions    Lescol      GI Symptoms    Lipitor      Myalgia    Pcn [Penicillins]     Zocor [Simvastatin]      myalgia     Prior to Admission medications    Medication Sig Start Date End Date Taking?  Authorizing Provider   Cyanocobalamin (VITAMIN B 12) 500 MCG TABS Take 1,000 mg by mouth daily   Yes Historical Provider, MD   Omega-3 Fatty Acids (OMEGA-3 FISH OIL PO) Take by mouth daily   Yes Historical Provider, MD   clopidogrel (PLAVIX) 75 MG tablet Take 1 tablet by mouth daily 21 Yes Ruth Antoine MD   topiramate (TOPAMAX) 25 MG tablet Take 25 mg by mouth 2 times daily    Yes Historical Provider, MD   Probiotic Product (PROBIOTIC-10 PO) Take by mouth   Yes Historical Provider, MD   Multiple Vitamins-Minerals (PRESERVISION AREDS) CAPS Take 1 capsule by mouth 2 times daily   Yes Historical Provider, MD   celecoxib (CELEBREX) 200 MG capsule Take 200 mg by mouth daily   Yes Historical Provider, MD   Cholecalciferol (VITAMIN D-3 PO) Take by mouth daily    Yes Historical Provider, MD   albuterol sulfate HFA (VENTOLIN HFA) 108 (90 Base) MCG/ACT inhaler Inhale 2 puffs into the lungs 4 times daily as needed for Wheezing 20   Ranjana Davenport, DO     Past and person and non-anxious. No focal neurological deficit noted.  Bilateral upper extremity tremors noted. Psychiatric: Normal mood and effect. EKG is consistent with normal sinus rhythm questionable old anterior infarction rate is 94 bpm.  When compared to last EKG there is no significant change. Pertinent records reviewed and discussed with patient and results are as follow:    Lab Results   Component Value Date    WBC 6.4 05/06/2021    HGB 13.8 05/06/2021    HCT 42.5 05/06/2021     05/06/2021     Lab Results   Component Value Date    CHOL 171 07/15/2020    TRIG 150 07/15/2020    HDL 69 07/15/2020    LDLCALC 72 07/15/2020     Lab Results   Component Value Date    BUN 18 05/06/2021    CREATININE 0.8 05/06/2021     05/06/2021    K 4.1 05/06/2021     Lab Results   Component Value Date    INR 1.10 12/05/2020     ASSESSMENT/PLAN:    1. 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 Continue risk factor modification. Orders:  -     EKG 12 lead  2. Pure hypercholesterolemia  Assessment & Plan:  She is not tolerating well small dose of Crestor. She can stay off of. Ok to stay off Toprol and Crestor. Continue other current medications. Follow-up with PCP and see me as needed. An electronic signature was used to authenticate this note.     --Kayleen Estevez MD

## 2021-11-22 NOTE — PATIENT INSTRUCTIONS
Ok to stay off Toprol and Crestor. Continue other current medications. Follow-up with PCP and see me as needed.

## 2022-01-22 ENCOUNTER — TELEPHONE (OUTPATIENT)
Dept: CARDIOLOGY CLINIC | Age: 87
End: 2022-01-22

## 2022-01-24 ENCOUNTER — HOSPITAL ENCOUNTER (OUTPATIENT)
Age: 87
Discharge: HOME | End: 2022-01-24
Payer: MEDICARE

## 2022-01-24 PROCEDURE — U0003 INFECTIOUS AGENT DETECTION BY NUCLEIC ACID (DNA OR RNA); SEVERE ACUTE RESPIRATORY SYNDROME CORONAVIRUS 2 (SARS-COV-2) (CORONAVIRUS DISEASE [COVID-19]), AMPLIFIED PROBE TECHNIQUE, MAKING USE OF HIGH THROUGHPUT TECHNOLOGIES AS DESCRIBED BY CMS-2020-01-R: HCPCS

## 2022-01-24 PROCEDURE — U0005 INFEC AGEN DETEC AMPLI PROBE: HCPCS

## 2022-01-25 LAB
SARS-COV-2: NOT DETECTED
SOURCE: NORMAL

## 2022-02-07 ENCOUNTER — HOSPITAL ENCOUNTER (OUTPATIENT)
Age: 87
Discharge: HOME | End: 2022-02-07
Payer: MEDICARE

## 2022-02-07 PROCEDURE — U0003 INFECTIOUS AGENT DETECTION BY NUCLEIC ACID (DNA OR RNA); SEVERE ACUTE RESPIRATORY SYNDROME CORONAVIRUS 2 (SARS-COV-2) (CORONAVIRUS DISEASE [COVID-19]), AMPLIFIED PROBE TECHNIQUE, MAKING USE OF HIGH THROUGHPUT TECHNOLOGIES AS DESCRIBED BY CMS-2020-01-R: HCPCS

## 2022-02-07 PROCEDURE — U0005 INFEC AGEN DETEC AMPLI PROBE: HCPCS

## 2022-02-08 ENCOUNTER — ANESTHESIA EVENT (OUTPATIENT)
Dept: OPERATING ROOM | Age: 87
End: 2022-02-08
Payer: MEDICARE

## 2022-02-08 LAB
SARS-COV-2: NOT DETECTED
SOURCE: NORMAL

## 2022-02-08 NOTE — ANESTHESIA PRE PROCEDURE
Department of Anesthesiology  Preprocedure Note       Name:  Frances Robison   Age:  81 y.o.  :  1935                                          MRN:  [id number]         Date:  2022      Surgeon: Filiberto Gloria):  Quoc De Leon MD    Procedure: Procedure(s):  RIGHT EYE CATARACT EXTRACTION    Medications prior to admission:   Prior to Admission medications    Medication Sig Start Date End Date Taking? Authorizing Provider   Cyanocobalamin (VITAMIN B 12) 500 MCG TABS Take 1,000 mg by mouth daily    Historical Provider, MD   Omega-3 Fatty Acids (OMEGA-3 FISH OIL PO) Take by mouth daily    Historical Provider, MD   clopidogrel (PLAVIX) 75 MG tablet Take 1 tablet by mouth daily 21  Madie Handy MD   topiramate (TOPAMAX) 25 MG tablet Take 25 mg by mouth 2 times daily     Historical Provider, MD   albuterol sulfate HFA (VENTOLIN HFA) 108 (90 Base) MCG/ACT inhaler Inhale 2 puffs into the lungs 4 times daily as needed for Wheezing 20   Ellender Apley, DO   Probiotic Product (PROBIOTIC-10 PO) Take by mouth    Historical Provider, MD   Multiple Vitamins-Minerals (PRESERVISION AREDS) CAPS Take 1 capsule by mouth 2 times daily    Historical Provider, MD   celecoxib (CELEBREX) 200 MG capsule Take 200 mg by mouth daily    Historical Provider, MD   Cholecalciferol (VITAMIN D-3 PO) Take by mouth daily     Historical Provider, MD       Current medications:    No current facility-administered medications for this encounter.      Current Outpatient Medications   Medication Sig Dispense Refill    Cyanocobalamin (VITAMIN B 12) 500 MCG TABS Take 1,000 mg by mouth daily      Omega-3 Fatty Acids (OMEGA-3 FISH OIL PO) Take by mouth daily      clopidogrel (PLAVIX) 75 MG tablet Take 1 tablet by mouth daily 90 tablet 3    topiramate (TOPAMAX) 25 MG tablet Take 25 mg by mouth 2 times daily       albuterol sulfate HFA (VENTOLIN HFA) 108 (90 Base) MCG/ACT inhaler Inhale 2 puffs into the lungs 4 times daily as needed for Wheezing 1 Inhaler 0    Probiotic Product (PROBIOTIC-10 PO) Take by mouth      Multiple Vitamins-Minerals (PRESERVISION AREDS) CAPS Take 1 capsule by mouth 2 times daily      celecoxib (CELEBREX) 200 MG capsule Take 200 mg by mouth daily      Cholecalciferol (VITAMIN D-3 PO) Take by mouth daily          Allergies: Allergies   Allergen Reactions    Lescol      GI Symptoms    Lipitor      Myalgia    Pcn [Penicillins]     Zocor [Simvastatin]      myalgia       Problem List:    Patient Active Problem List   Diagnosis Code    Hyperlipidemia E78.5    Palpitations R00.2    Anxiety F41.9    Chest pain R07.9    Tremors of nervous system R25.1    CAD, coronary ectasia by cath 2004 I25.10       Past Medical History:        Diagnosis Date    Anxiety     Coronary artery disease involving native coronary artery of native heart without angina pectoris 03/20/2018    Ectatic vessel with sluggish blood flow 2004 cath    Essential tremor     H/O cardiac catheterization 1993, 2004    Ectasia of cornaries, normal EF    H/O cardiovascular stress test 04/29/2014    Stress Cardiolite. Normal perfusion in the distribution of all coronaries, normal LV size and function, EF 70%.  H/O echocardiogram 04/29/2014    EF 50-55%, mild TR, doppler flow pattern is suggestive of impaired LV relaxation    H/O echocardiogram 01/14/2021    EF 55-60%. E/A reversal suggests abnormal LV relaxation. Mild MR.    H/O exercise stress test 05/1993    Heart murmur     Heart palpitations     Hyperlipidemia     MVP (mitral valve prolapse)     Obesity        Past Surgical History:        Procedure Laterality Date    CHOLECYSTECTOMY      DILATION AND CURETTAGE OF UTERUS      TONSILLECTOMY      VAGINAL PROLAPSE REPAIR  11/04/2019       Social History:    Social History     Tobacco Use    Smoking status: Never Smoker    Smokeless tobacco: Never Used   Substance Use Topics    Alcohol use:  No Counseling given: Not Answered      Vital Signs (Current): There were no vitals filed for this visit. BP Readings from Last 3 Encounters:   11/22/21 106/66   05/19/21 120/72   01/06/21 120/70       NPO Status:                                                                                 BMI:   Wt Readings from Last 3 Encounters:   11/22/21 161 lb (73 kg)   05/19/21 168 lb (76.2 kg)   01/06/21 172 lb (78 kg)     There is no height or weight on file to calculate BMI.    CBC:   Lab Results   Component Value Date    WBC 6.4 05/06/2021    RBC 4.59 05/06/2021    HGB 13.8 05/06/2021    HCT 42.5 05/06/2021    MCV 92.6 05/06/2021    RDW 12.6 05/06/2021     05/06/2021       CMP:   Lab Results   Component Value Date     08/18/2021    K 4.1 08/18/2021     08/18/2021    CO2 24 05/06/2021    BUN 15 08/18/2021    CREATININE 0.8 08/18/2021    GFRAA >60 05/06/2021    LABGLOM >60 05/06/2021    GLUCOSE 94 08/18/2021    PROT 7.1 05/06/2021    CALCIUM 9.6 08/18/2021    BILITOT 0.4 08/18/2021    ALKPHOS 69 08/18/2021    AST 14 08/18/2021    ALT 9 08/18/2021       POC Tests: No results for input(s): POCGLU, POCNA, POCK, POCCL, POCBUN, POCHEMO, POCHCT in the last 72 hours.     Coags:   Lab Results   Component Value Date    PROTIME 12.6 12/05/2020    INR 1.10 12/05/2020    APTT 40.4 12/05/2020       HCG (If Applicable): No results found for: PREGTESTUR, PREGSERUM, HCG, HCGQUANT     ABGs: No results found for: PHART, PO2ART, FBP7ULX, WTN6IPQ, BEART, P5SRIKCL     Type & Screen (If Applicable):  No results found for: LABABO, LABRH    Drug/Infectious Status (If Applicable):  No results found for: HIV, HEPCAB    COVID-19 Screening (If Applicable):   Lab Results   Component Value Date    COVID19 NOT DETECTED 01/24/2022           Anesthesia Evaluation  Patient summary reviewed  Airway:         Dental:          Pulmonary:   (+) COPD:                             Cardiovascular:    (+) CAD:, hyperlipidemia      ECG reviewed      Echocardiogram reviewed    Cleared by cardiology           ROS comment:  Summary   Left ventricular function is normal, EF is estimated at 55-60%. E/A reversal suggests abnormal LV relaxation. No regional wall motion abnormalities were detected. Mild mitral regurgitation is present. RVSP is 18 mmHg. No evidence of pericardial effusion. Signature      ------------------------------------------------------------------   Electronically signed by Domitila Scott MD   (Interpreting physician) on 01/15/2021 at 11:26 AM   ------------------------------------------------------------------   Sinus  Rhythm   -Old anterior infarct. ABNORMAL   2021    Clementeen Code was evaluated from a cardiac standpoint for her surgery or procedure and based on her history, diagnosis, recent cardiac testing, she is considered a low risk candidate for any la nena-operative cardiac complications.     Patients with known CAD with moderate or high risk should have surgical procedures done where they have access to invasive cardiology services if emergently needed.     Antiplatelet/anticoagulant therapy can be held prior to the surgery or procedure at the discretion of the surgeon to be resumed as soon as possible if held.     Please call with any further questions.     Respectfully,      Agnes Mcdonald MD, [facility]     This cardiac clearance is good for 6 months from 1/22/2022 unless new cardiac symptoms arise.          Revision History      Revised by Mark Anthony Parker on 1/22/2022 at 8:07 AM (current version)  Created by Agnes Mcdonald MD on 1/22/2022 at 7:59 AM           Neuro/Psych:   (+) depression/anxiety              ROS comment: tremors GI/Hepatic/Renal:             Endo/Other:    (+) blood dyscrasia: anticoagulation therapy:., .                 Abdominal:             Vascular:           Other Findings:           Anesthesia Plan      MAC     ASA 2     (Chart review only 2/8/22  covid pending )  Induction: intravenous.                         HAZEL Schmitz - CRNA   2/8/2022

## 2022-02-10 NOTE — PROGRESS NOTES
Left message with the following instructions:     Surgery Date:  2/11/22                                  Arrive at: 0700  Surgery time: 0900     If your arrival time is before 7 AM come in the emergency room entrance. If after 7 AM come in the main entrance. One visitor may accompany you to the hospital.  Both of you must wear a mask and be free of covid symptoms. 1. Do not eat or drink anything after midnight - unless instructed by your doctor prior to surgery. This includes                  no water, chewing gum or mints. 2. Follow your directions as prescribed by the doctor for your procedure and medications. Take the following medications with a small sip of water the morning of: none              3. Check with your Doctor regarding stopping Plavix, Coumadin, Lovenox, Effient, Pradaxa, Xarelto, Fragmin or other blood thinners and                  follow their instructions. 4. Do not smoke and do not drink any alcoholic beverages 24 hours prior to surgery. 5. You may brush your teeth and gargle the morning of surgery. DO NOT SWALLOW WATER  6. Please wear simple, loose fitting clothing to the hospital.  Kailashmiguelito Stacie not bring valuables (money, credit cards, checkbooks, etc.) Do not wear any                  makeup (including no eye makeup) or nail polish on your fingers or toes. 7.  DO NOT wear any jewelry or piercings on day of surgery. All body piercing jewelry must be removed. 8.  Take a shower the night before or morning of your procedure, do not apply any lotion, oil or powder. 9. If you have dentures, they will be removed before going to the OR; we will provide you a container. If you wear contact lenses or glasses,                 they will be removed; please bring a case for them. 10. If you  have a Living Will and Durable Power of  for Healthcare, please bring in a copy.            11. Please bring picture ID,  insurance card, paperwork from the doctors office    (H & P, Consent, & card for implantable devices). 12. You MUST make arrangements for a responsible adult to take you home after your surgery and be able to check on you every couple                  hours for the day. You will not be allowed to leave alone or drive yourself home. It is strongly suggested someone stay with you the first 24                  hrs. Your surgery will be cancelled if you do not have a ride home. 13. Wear a mask covering your nose & mouth when entering the hospital. Have your covid-19 test performed within 10 days of your                 surgery. Quarantine yourself after the test until after your surgery. Please call [phone] with any questions.

## 2022-02-11 ENCOUNTER — HOSPITAL ENCOUNTER (OUTPATIENT)
Age: 87
Setting detail: OUTPATIENT SURGERY
Discharge: HOME | End: 2022-02-11
Attending: OPHTHALMOLOGY | Admitting: OPHTHALMOLOGY
Payer: MEDICARE

## 2022-02-11 ENCOUNTER — ANESTHESIA (OUTPATIENT)
Dept: OPERATING ROOM | Age: 87
End: 2022-02-11
Payer: MEDICARE

## 2022-02-11 VITALS
BODY MASS INDEX: 27.32 KG/M2 | HEART RATE: 64 BPM | SYSTOLIC BLOOD PRESSURE: 103 MMHG | RESPIRATION RATE: 18 BRPM | TEMPERATURE: 96.6 F | WEIGHT: 164 LBS | OXYGEN SATURATION: 100 % | DIASTOLIC BLOOD PRESSURE: 64 MMHG | HEIGHT: 65 IN

## 2022-02-11 VITALS — SYSTOLIC BLOOD PRESSURE: 106 MMHG | OXYGEN SATURATION: 96 % | DIASTOLIC BLOOD PRESSURE: 69 MMHG

## 2022-02-11 PROBLEM — H25.11 AGE-RELATED NUCLEAR CATARACT OF RIGHT EYE: Status: ACTIVE | Noted: 2022-02-11

## 2022-02-11 PROCEDURE — 3700000000 HC ANESTHESIA ATTENDED CARE: Performed by: OPHTHALMOLOGY

## 2022-02-11 PROCEDURE — 3600000014 HC SURGERY LEVEL 4 ADDTL 15MIN: Performed by: OPHTHALMOLOGY

## 2022-02-11 PROCEDURE — 3600000004 HC SURGERY LEVEL 4 BASE: Performed by: OPHTHALMOLOGY

## 2022-02-11 PROCEDURE — 7100000011 HC PHASE II RECOVERY - ADDTL 15 MIN: Performed by: OPHTHALMOLOGY

## 2022-02-11 PROCEDURE — 6370000000 HC RX 637 (ALT 250 FOR IP): Performed by: OPHTHALMOLOGY

## 2022-02-11 PROCEDURE — 2500000003 HC RX 250 WO HCPCS: Performed by: OPHTHALMOLOGY

## 2022-02-11 PROCEDURE — V2632 POST CHMBR INTRAOCULAR LENS: HCPCS | Performed by: OPHTHALMOLOGY

## 2022-02-11 PROCEDURE — 2580000003 HC RX 258: Performed by: OPHTHALMOLOGY

## 2022-02-11 PROCEDURE — 2709999900 HC NON-CHARGEABLE SUPPLY: Performed by: OPHTHALMOLOGY

## 2022-02-11 PROCEDURE — 3700000001 HC ADD 15 MINUTES (ANESTHESIA): Performed by: OPHTHALMOLOGY

## 2022-02-11 PROCEDURE — 7100000010 HC PHASE II RECOVERY - FIRST 15 MIN: Performed by: OPHTHALMOLOGY

## 2022-02-11 PROCEDURE — 6360000002 HC RX W HCPCS: Performed by: OPHTHALMOLOGY

## 2022-02-11 PROCEDURE — 6360000002 HC RX W HCPCS: Performed by: NURSE ANESTHETIST, CERTIFIED REGISTERED

## 2022-02-11 DEVICE — AKREOS AO MICRO INCISION LENS +0.00D
Type: IMPLANTABLE DEVICE | Site: EYE | Status: FUNCTIONAL
Brand: AKREOS® AO IOL

## 2022-02-11 RX ORDER — CYCLOPENTOLATE HYDROCHLORIDE 10 MG/ML
1 SOLUTION/ DROPS OPHTHALMIC SEE ADMIN INSTRUCTIONS
Status: DISCONTINUED | OUTPATIENT
Start: 2022-02-11 | End: 2022-02-11 | Stop reason: HOSPADM

## 2022-02-11 RX ORDER — TETRACAINE HYDROCHLORIDE 5 MG/ML
SOLUTION OPHTHALMIC
Status: COMPLETED | OUTPATIENT
Start: 2022-02-11 | End: 2022-02-11

## 2022-02-11 RX ORDER — LIDOCAINE HYDROCHLORIDE 20 MG/ML
INJECTION, SOLUTION EPIDURAL; INFILTRATION; INTRACAUDAL; PERINEURAL
Status: COMPLETED | OUTPATIENT
Start: 2022-02-11 | End: 2022-02-11

## 2022-02-11 RX ORDER — PROPOFOL 10 MG/ML
INJECTION, EMULSION INTRAVENOUS PRN
Status: DISCONTINUED | OUTPATIENT
Start: 2022-02-11 | End: 2022-02-11 | Stop reason: SDUPTHER

## 2022-02-11 RX ORDER — TROPICAMIDE 10 MG/ML
1 SOLUTION/ DROPS OPHTHALMIC SEE ADMIN INSTRUCTIONS
Status: DISCONTINUED | OUTPATIENT
Start: 2022-02-11 | End: 2022-02-11 | Stop reason: HOSPADM

## 2022-02-11 RX ORDER — BETAMETHASONE SODIUM PHOSPHATE AND BETAMETHASONE ACETATE 3; 3 MG/ML; MG/ML
INJECTION, SUSPENSION INTRA-ARTICULAR; INTRALESIONAL; INTRAMUSCULAR; SOFT TISSUE
Status: COMPLETED | OUTPATIENT
Start: 2022-02-11 | End: 2022-02-11

## 2022-02-11 RX ORDER — SODIUM CHLORIDE, SODIUM LACTATE, POTASSIUM CHLORIDE, CALCIUM CHLORIDE 600; 310; 30; 20 MG/100ML; MG/100ML; MG/100ML; MG/100ML
1000 INJECTION, SOLUTION INTRAVENOUS CONTINUOUS
Status: DISCONTINUED | OUTPATIENT
Start: 2022-02-11 | End: 2022-02-11 | Stop reason: HOSPADM

## 2022-02-11 RX ORDER — MOXIFLOXACIN 5 MG/ML
SOLUTION/ DROPS OPHTHALMIC
Status: COMPLETED | OUTPATIENT
Start: 2022-02-11 | End: 2022-02-11

## 2022-02-11 RX ORDER — PHENYLEPHRINE HCL 2.5 %
1 DROPS OPHTHALMIC (EYE) SEE ADMIN INSTRUCTIONS
Status: DISCONTINUED | OUTPATIENT
Start: 2022-02-11 | End: 2022-02-11 | Stop reason: HOSPADM

## 2022-02-11 RX ORDER — LIDOCAINE HYDROCHLORIDE 20 MG/ML
INJECTION, SOLUTION INTRAVENOUS PRN
Status: DISCONTINUED | OUTPATIENT
Start: 2022-02-11 | End: 2022-02-11 | Stop reason: SDUPTHER

## 2022-02-11 RX ORDER — SODIUM CHLORIDE 0.9 % (FLUSH) 0.9 %
10 SYRINGE (ML) INJECTION ONCE
Status: DISCONTINUED | OUTPATIENT
Start: 2022-02-11 | End: 2022-02-11 | Stop reason: HOSPADM

## 2022-02-11 RX ADMIN — PHENYLEPHRINE HYDROCHLORIDE 1 DROP: 25 SOLUTION/ DROPS OPHTHALMIC at 07:29

## 2022-02-11 RX ADMIN — PHENYLEPHRINE HYDROCHLORIDE 1 DROP: 25 SOLUTION/ DROPS OPHTHALMIC at 08:15

## 2022-02-11 RX ADMIN — CYCLOPENTOLATE HYDROCHLORIDE 1 DROP: 10 SOLUTION OPHTHALMIC at 07:48

## 2022-02-11 RX ADMIN — SODIUM CHLORIDE, POTASSIUM CHLORIDE, SODIUM LACTATE AND CALCIUM CHLORIDE 1000 ML: 600; 310; 30; 20 INJECTION, SOLUTION INTRAVENOUS at 07:37

## 2022-02-11 RX ADMIN — LIDOCAINE HYDROCHLORIDE 100 MG: 20 INJECTION, SOLUTION INTRAVENOUS at 09:09

## 2022-02-11 RX ADMIN — TROPICAMIDE 1 DROP: 10 SOLUTION/ DROPS OPHTHALMIC at 07:29

## 2022-02-11 RX ADMIN — TROPICAMIDE 1 DROP: 10 SOLUTION/ DROPS OPHTHALMIC at 07:48

## 2022-02-11 RX ADMIN — PROPOFOL 40 MG: 10 INJECTION, EMULSION INTRAVENOUS at 09:09

## 2022-02-11 RX ADMIN — PHENYLEPHRINE HYDROCHLORIDE 1 DROP: 25 SOLUTION/ DROPS OPHTHALMIC at 07:49

## 2022-02-11 RX ADMIN — CYCLOPENTOLATE HYDROCHLORIDE 1 DROP: 10 SOLUTION OPHTHALMIC at 08:15

## 2022-02-11 RX ADMIN — TROPICAMIDE 1 DROP: 10 SOLUTION/ DROPS OPHTHALMIC at 08:15

## 2022-02-11 RX ADMIN — CYCLOPENTOLATE HYDROCHLORIDE 1 DROP: 10 SOLUTION OPHTHALMIC at 07:29

## 2022-02-11 ASSESSMENT — PULMONARY FUNCTION TESTS
PIF_VALUE: 0

## 2022-02-11 ASSESSMENT — PAIN SCALES - GENERAL: PAINLEVEL_OUTOF10: 0

## 2022-02-11 ASSESSMENT — PAIN - FUNCTIONAL ASSESSMENT: PAIN_FUNCTIONAL_ASSESSMENT: 0-10

## 2022-02-11 NOTE — PROGRESS NOTES
8696- Pt returned to SDS, respirations even and unlabored, pt alert, family at bedside, report at bedside w/ Allen Robison, denies pain  8744- beverage provided to pt, tolerating well   6759- reviewed Discharge instructions w/ pt and pt daughter at bedside, no questions at this time   46- pt dressing w/ assistance from daughter  1- pt discharged via car w/ pt daughter driving

## 2022-02-11 NOTE — ANESTHESIA POSTPROCEDURE EVALUATION
Department of Anesthesiology  Postprocedure Note    Patient: Maci Morejon  MRN: [id number]  YOB: 1935  Date of evaluation: 2/11/2022  Time:  9:26 AM     Procedure Summary     Date: 02/11/22 Room / Location: [address] [place] [address] 01 [facility]    Anesthesia Start: 8428 Anesthesia Stop: 1672    Procedure: RIGHT EYE CATARACT EXTRACTION WITH INTRAOCCULAR LENS IMPLANT (Right Eye) Diagnosis:       Combined forms of age-related cataract of right eye      (Combined forms of age-related cataract of right eye [H25.811])    Surgeons: David Lopez MD Responsible Provider: HAZEL Ceja CRNA    Anesthesia Type: MAC ASA Status: 2          Anesthesia Type: MAC    Rodriguez Phase I:      Rodriguez Phase II:      Last vitals: Reviewed and per EMR flowsheets.        Anesthesia Post Evaluation    Patient location during evaluation: bedside  Patient participation: complete - patient participated  Level of consciousness: awake and alert  Pain score: 0  Airway patency: patent  Nausea & Vomiting: no nausea and no vomiting  Complications: no  Cardiovascular status: blood pressure returned to baseline  Respiratory status: acceptable  Hydration status: euvolemic

## 2022-02-11 NOTE — OP NOTE
my office in 1 day.     Blood loss was 0.5 cc.                _______________________           Scarlett Lea M.D.

## 2022-02-11 NOTE — ANESTHESIA PRE PROCEDURE
Department of Anesthesiology  Preprocedure Note       Name:  Rosa Sears   Age:  81 y.o.  :  1935                                          MRN:  [id number]         Date:  2022      Surgeon: Flaquito Galindo):  Emanuel Day MD    Procedure: Procedure(s):  RIGHT EYE CATARACT EXTRACTION    Medications prior to admission:   Prior to Admission medications    Medication Sig Start Date End Date Taking?  Authorizing Provider   Cyanocobalamin (VITAMIN B 12) 500 MCG TABS Take 1,000 mg by mouth daily   Yes Historical Provider, MD   Omega-3 Fatty Acids (OMEGA-3 FISH OIL PO) Take by mouth daily   Yes Historical Provider, MD   topiramate (TOPAMAX) 25 MG tablet Take 25 mg by mouth 2 times daily    Yes Historical Provider, MD   Multiple Vitamins-Minerals (PRESERVISION AREDS) CAPS Take 1 capsule by mouth 2 times daily   Yes Historical Provider, MD   Cholecalciferol (VITAMIN D-3 PO) Take by mouth daily    Yes Historical Provider, MD   clopidogrel (PLAVIX) 75 MG tablet Take 1 tablet by mouth daily 21  Terra Ratliff MD   albuterol sulfate HFA (VENTOLIN HFA) 108 (90 Base) MCG/ACT inhaler Inhale 2 puffs into the lungs 4 times daily as needed for Wheezing 20   Radha Gene, DO   Probiotic Product (PROBIOTIC-10 PO) Take by mouth    Historical Provider, MD   celecoxib (CELEBREX) 200 MG capsule Take 200 mg by mouth daily    Historical Provider, MD       Current medications:    Current Facility-Administered Medications   Medication Dose Route Frequency Provider Last Rate Last Admin    sodium chloride flush 0.9 % injection 10 mL  10 mL IntraVENous Once Emanuel Day MD        lactated ringers infusion 1,000 mL  1,000 mL IntraVENous Continuous Emanuel Day  mL/hr at 22 0737 1,000 mL at 22 0737    phenylephrine (MYDFRIN) 2.5 % ophthalmic solution 1 drop  1 drop Right Eye See Admin Instructions Emanuel Day MD   1 drop at 22 0815    tropicamide (MYDRIACYL) 1 % ophthalmic solution 1 drop  1 drop Right Eye See Admin Instructions Chanel Sosa MD   1 drop at 02/11/22 0815    cyclopentolate (CYCLOGYL) 1 % ophthalmic solution 1 drop  1 drop Right Eye See Admin Instructions Chanel Sosa MD   1 drop at 02/11/22 0815       Allergies: Allergies   Allergen Reactions    Lescol      GI Symptoms    Lipitor      Myalgia    Pcn [Penicillins]     Zocor [Simvastatin]      myalgia       Problem List:    Patient Active Problem List   Diagnosis Code    Hyperlipidemia E78.5    Palpitations R00.2    Anxiety F41.9    Chest pain R07.9    Tremors of nervous system R25.1    CAD, coronary ectasia by cath 2004 I25.10       Past Medical History:        Diagnosis Date    Anxiety     Coronary artery disease involving native coronary artery of native heart without angina pectoris 03/20/2018    Ectatic vessel with sluggish blood flow 2004 cath    Essential tremor     H/O cardiac catheterization 1993, 2004    Ectasia of cornaries, normal EF    H/O cardiovascular stress test 04/29/2014    Stress Cardiolite. Normal perfusion in the distribution of all coronaries, normal LV size and function, EF 70%.  H/O echocardiogram 04/29/2014    EF 50-55%, mild TR, doppler flow pattern is suggestive of impaired LV relaxation    H/O echocardiogram 01/14/2021    EF 55-60%. E/A reversal suggests abnormal LV relaxation. Mild MR.    H/O exercise stress test 05/1993    Heart murmur     Heart palpitations     Hyperlipidemia     MVP (mitral valve prolapse)     Obesity        Past Surgical History:        Procedure Laterality Date    CHOLECYSTECTOMY      DILATION AND CURETTAGE OF UTERUS      TONSILLECTOMY      VAGINAL PROLAPSE REPAIR  11/04/2019       Social History:    Social History     Tobacco Use    Smoking status: Never Smoker    Smokeless tobacco: Never Used   Substance Use Topics    Alcohol use:  No                                Counseling given: Not Answered      Vital Signs (Current):   Vitals: 02/11/22 0712   BP: 116/71   Pulse: 89   Resp: 18   Temp: 36.4 °C (97.6 °F)   TempSrc: Temporal   SpO2: 95%   Weight: 164 lb (74.4 kg)   Height: 5' 5\" (1.651 m)                                              BP Readings from Last 3 Encounters:   02/11/22 116/71   11/22/21 106/66   05/19/21 120/72       NPO Status: Time of last liquid consumption: 2300                        Time of last solid consumption: 2300                        Date of last liquid consumption: 02/10/22                        Date of last solid food consumption: 02/10/22    BMI:   Wt Readings from Last 3 Encounters:   02/11/22 164 lb (74.4 kg)   11/22/21 161 lb (73 kg)   05/19/21 168 lb (76.2 kg)     Body mass index is 27.29 kg/m². CBC:   Lab Results   Component Value Date    WBC 6.4 05/06/2021    RBC 4.59 05/06/2021    HGB 13.8 05/06/2021    HCT 42.5 05/06/2021    MCV 92.6 05/06/2021    RDW 12.6 05/06/2021     05/06/2021       CMP:   Lab Results   Component Value Date     08/18/2021    K 4.1 08/18/2021     08/18/2021    CO2 24 05/06/2021    BUN 15 08/18/2021    CREATININE 0.8 08/18/2021    GFRAA >60 05/06/2021    LABGLOM >60 05/06/2021    GLUCOSE 94 08/18/2021    PROT 7.1 05/06/2021    CALCIUM 9.6 08/18/2021    BILITOT 0.4 08/18/2021    ALKPHOS 69 08/18/2021    AST 14 08/18/2021    ALT 9 08/18/2021       POC Tests: No results for input(s): POCGLU, POCNA, POCK, POCCL, POCBUN, POCHEMO, POCHCT in the last 72 hours.     Coags:   Lab Results   Component Value Date    PROTIME 12.6 12/05/2020    INR 1.10 12/05/2020    APTT 40.4 12/05/2020       HCG (If Applicable): No results found for: PREGTESTUR, PREGSERUM, HCG, HCGQUANT     ABGs: No results found for: PHART, PO2ART, KIZ9GJB, IFZ1TWP, BEART, P5SSDXGT     Type & Screen (If Applicable):  No results found for: LABABO, LABRH    Drug/Infectious Status (If Applicable):  No results found for: HIV, HEPCAB    COVID-19 Screening (If Applicable):   Lab Results   Component Value Date COVID19 NOT DETECTED 02/07/2022           Anesthesia Evaluation  Patient summary reviewed  Airway: Mallampati: II  TM distance: >3 FB   Neck ROM: full  Mouth opening: > = 3 FB Dental: normal exam         Pulmonary:normal exam    (+) COPD:                             Cardiovascular:    (+) CAD:, hyperlipidemia      ECG reviewed      Echocardiogram reviewed    Cleared by cardiology           ROS comment:  Summary   Left ventricular function is normal, EF is estimated at 55-60%. E/A reversal suggests abnormal LV relaxation. No regional wall motion abnormalities were detected. Mild mitral regurgitation is present. RVSP is 18 mmHg. No evidence of pericardial effusion. Signature      ------------------------------------------------------------------   Electronically signed by Campos Michelle MD   (Interpreting physician) on 01/15/2021 at 11:26 AM   ------------------------------------------------------------------   Sinus  Rhythm   -Old anterior infarct.      ABNORMAL   2021    Monicayin Frances was evaluated from a cardiac standpoint for her surgery or procedure and based on her history, diagnosis, recent cardiac testing, she is considered a low risk candidate for any la nena-operative cardiac complications.     Patients with known CAD with moderate or high risk should have surgical procedures done where they have access to invasive cardiology services if emergently needed.     Antiplatelet/anticoagulant therapy can be held prior to the surgery or procedure at the discretion of the surgeon to be resumed as soon as possible if held.     Please call with any further questions.     Respectfully,      John No MD, [facility]     This cardiac clearance is good for 6 months from 1/22/2022 unless new cardiac symptoms arise.          Revision History      Revised by Linda Siddiqui on 1/22/2022 at 8:07 AM (current version)  Created by John No MD on 1/22/2022 at 7:59 AM           Neuro/Psych: (+) depression/anxiety              ROS comment: tremors GI/Hepatic/Renal:             Endo/Other:    (+) blood dyscrasia: anticoagulation therapy:., .                 Abdominal:             Vascular: Other Findings:             Anesthesia Plan      MAC     ASA 2       Induction: intravenous. Anesthetic plan and risks discussed with patient.       Plan discussed with surgical team.                  HAZEL Child - CRNA   2/11/2022

## 2022-02-23 ENCOUNTER — NURSE ONLY (OUTPATIENT)
Dept: CARDIOLOGY CLINIC | Age: 87
End: 2022-02-23
Payer: MEDICARE

## 2022-02-23 ENCOUNTER — OFFICE VISIT (OUTPATIENT)
Dept: CARDIOLOGY CLINIC | Age: 87
End: 2022-02-23
Payer: MEDICARE

## 2022-02-23 VITALS
SYSTOLIC BLOOD PRESSURE: 110 MMHG | WEIGHT: 163 LBS | DIASTOLIC BLOOD PRESSURE: 64 MMHG | HEIGHT: 64 IN | BODY MASS INDEX: 27.83 KG/M2 | HEART RATE: 91 BPM

## 2022-02-23 DIAGNOSIS — I20.8 OTHER FORMS OF ANGINA PECTORIS (HCC): ICD-10-CM

## 2022-02-23 DIAGNOSIS — R00.2 PALPITATION: Primary | ICD-10-CM

## 2022-02-23 DIAGNOSIS — I25.10 CORONARY ARTERY DISEASE INVOLVING NATIVE CORONARY ARTERY OF NATIVE HEART WITHOUT ANGINA PECTORIS: ICD-10-CM

## 2022-02-23 DIAGNOSIS — E78.00 PURE HYPERCHOLESTEROLEMIA: ICD-10-CM

## 2022-02-23 DIAGNOSIS — R00.2 HEART PALPITATIONS: ICD-10-CM

## 2022-02-23 DIAGNOSIS — R00.2 PALPITATIONS: Primary | ICD-10-CM

## 2022-02-23 PROCEDURE — G8417 CALC BMI ABV UP PARAM F/U: HCPCS | Performed by: INTERNAL MEDICINE

## 2022-02-23 PROCEDURE — 1123F ACP DISCUSS/DSCN MKR DOCD: CPT | Performed by: INTERNAL MEDICINE

## 2022-02-23 PROCEDURE — G8484 FLU IMMUNIZE NO ADMIN: HCPCS | Performed by: INTERNAL MEDICINE

## 2022-02-23 PROCEDURE — 93242 EXT ECG>48HR<7D RECORDING: CPT | Performed by: INTERNAL MEDICINE

## 2022-02-23 PROCEDURE — 93000 ELECTROCARDIOGRAM COMPLETE: CPT | Performed by: INTERNAL MEDICINE

## 2022-02-23 PROCEDURE — 1090F PRES/ABSN URINE INCON ASSESS: CPT | Performed by: INTERNAL MEDICINE

## 2022-02-23 PROCEDURE — G8399 PT W/DXA RESULTS DOCUMENT: HCPCS | Performed by: INTERNAL MEDICINE

## 2022-02-23 PROCEDURE — 1036F TOBACCO NON-USER: CPT | Performed by: INTERNAL MEDICINE

## 2022-02-23 PROCEDURE — 99214 OFFICE O/P EST MOD 30 MIN: CPT | Performed by: INTERNAL MEDICINE

## 2022-02-23 PROCEDURE — G8428 CUR MEDS NOT DOCUMENT: HCPCS | Performed by: INTERNAL MEDICINE

## 2022-02-23 PROCEDURE — 4040F PNEUMOC VAC/ADMIN/RCVD: CPT | Performed by: INTERNAL MEDICINE

## 2022-02-23 NOTE — PATIENT INSTRUCTIONS
Please be informed that if you contact our office outside of normal business hours the physician on call cannot help with any scheduling or rescheduling issues, procedure instruction questions or any type of medication issue. We advise you for any urgent/emergency that you go to the nearest emergency room! PLEASE CALL OUR OFFICE DURING NORMAL BUSINESS HOURS    Monday - Friday   8 am to 5 pm    [facility] [address]: [phone]    [place]:  [phone]  **It is YOUR responsibilty to bring medication bottles and/or updated medication list to [address] [place] [address]. This will allow us to better serve you and all your healthcare needs**  [facility] Locations - No appointment necessary. Sites open Monday to Friday. Call your preferred location for test preparation, business hours and other information you need. [employer] [facility] [employer]. [facility] [address] [place], [address] [facility]  Phone: [phone] [facility] [address] [facility]  [address]. [facility], [address] [place] [address]  Phone: [phone]     Continue current cardiovascular medications which have been reviewed and discussed individually with you. 2 weeks of continuous cardiac monitoring to evaluate symptoms. Follow-up in 4 weeks, sooner if needed.

## 2022-02-23 NOTE — ASSESSMENT & PLAN NOTE
Atypical by description probably cervical radiculopathy. Do not recommend further cardiac work-up at this point.

## 2022-02-23 NOTE — ASSESSMENT & PLAN NOTE
Atypical symptoms. cardiac in etiology. Continue aggressive risk factor modification for primary prevention.

## 2022-02-23 NOTE — ASSESSMENT & PLAN NOTE
Palpitations are associated with profound fatigue and weakness but denies any syncope or near syncope. We will continue with monitoring for 2 weeks to evaluate this symptom further and follow-up.

## 2022-02-23 NOTE — PROGRESS NOTES
Alice Miller (:  1935) is a 81 y.o. female,     Chief Complaint   Patient presents with    6 Month Follow-Up     CP are dull they last seconds started weeks ago, Palpitations feels like a flutter can last seconds,SOBOE.  will be doing cataract surgery. Pt states she has been weak since she had Covid a yr ago. Pt states she does not get much exercise. Pt states she does not need any refills at this time    Chest Pain    Palpitations    Shortness of Breath    Fatigue     Patient is here for follow up for palpitations and episodes of feeling very weak and also complaining of fleeting chest aching. The symptoms are happening once in a week or once in 2 weeks. They are not every day and the last for several minutes. Fatigue last longer than chest pain and palpitation. Chest pains are usually for seconds and palpitations last for few minutes and then fatigue lingers around for several minutes. She also has cervicals spinal arthritis and has pain in the neck radiates down to the chest sometimes. She has known history of hypercholesterolemia and noncritical ectatic coronary arteries. She has declined taking COVID-19 shots and had COVID-19 infection in 2020. She lives by herself and stays active for her age of 81 goes out with friends eating outside and has her son across the street checks on her frequently. She does not smoke or drink alcohol. She reports her hand tremors are getting worse. Allergies   Allergen Reactions    Lescol      GI Symptoms    Lipitor      Myalgia    Pcn [Penicillins]     Zocor [Simvastatin]      myalgia     Prior to Admission medications    Medication Sig Start Date End Date Taking?  Authorizing Provider   prednisoLONE acetate (PRED MILD) 0.12 % ophthalmic suspension 1 drop 2 times daily Using until 2022   Yes Historical Provider, MD   Cyanocobalamin (VITAMIN B 12) 500 MCG TABS Take 1,000 mg by mouth daily   Yes Historical Provider, MD Omega-3 Fatty Acids (OMEGA-3 FISH OIL PO) Take by mouth daily   Yes Historical Provider, MD   clopidogrel (PLAVIX) 75 MG tablet Take 1 tablet by mouth daily 2/9/21 2/23/22 Yes Peyman Sanders MD   topiramate (TOPAMAX) 25 MG tablet Take 25 mg by mouth 2 times daily    Yes Historical Provider, MD   Multiple Vitamins-Minerals (PRESERVISION AREDS) CAPS Take 1 capsule by mouth 2 times daily   Yes Historical Provider, MD   celecoxib (CELEBREX) 200 MG capsule Take 200 mg by mouth daily   Yes Historical Provider, MD   Cholecalciferol (VITAMIN D-3 PO) Take by mouth daily    Yes Historical Provider, MD     Past Medical History:   Diagnosis Date    Anxiety     Coronary artery disease involving native coronary artery of native heart without angina pectoris 03/20/2018    Ectatic vessel with sluggish blood flow 2004 cath    Essential tremor     H/O cardiac catheterization 1993, 2004    Ectasia of cornaries, normal EF    H/O cardiovascular stress test 04/29/2014    Stress Cardiolite. Normal perfusion in the distribution of all coronaries, normal LV size and function, EF 70%.  H/O echocardiogram 04/29/2014    EF 50-55%, mild TR, doppler flow pattern is suggestive of impaired LV relaxation    H/O echocardiogram 01/14/2021    EF 55-60%. E/A reversal suggests abnormal LV relaxation. Mild MR.    H/O exercise stress test 05/1993    Heart murmur     Heart palpitations     Hyperlipidemia     MVP (mitral valve prolapse)     Obesity       Vitals:    02/23/22 0941   BP: 110/64   Pulse: 91   Weight: 163 lb (73.9 kg)   Height: 5' 4\" (1.626 m)      Body mass index is 27.98 kg/m².   Wt Readings from Last 3 Encounters:   02/23/22 163 lb (73.9 kg)   02/11/22 164 lb (74.4 kg)   11/22/21 161 lb (73 kg)     Constitutional:  Patient is normally built and nourished female in no apparent distress.    Eyes:  Pupils are equal.  She wears glasses. Wearing a face mask  NECK: No JVP or thyromegaly  Cardiovascular:  Auscultation: Normal S1 and S2.  No murmurs or gallops noted. Carotids are negative for bruits.  Abdominal aorta is nonpalpable.  No epigastric bruit noted. Peripheral pulses: Pedal pulses are 2+ in both feet. Respiratory:  Respiratory effort is Breath sounds are clear to auscultation. Extremities:  No edema, clubbing,  Cyanosis, petechiae. SKIN: Warm and well perfused, no pallor or cyanosis  Abdomen:  No masses or tenderness. No organomegaly noted. Musculoskeletal:  No spinal deformities noted.  Gait is normal. Muscle strength is normal. Fine tremors. Neurologic:  Oriented to time, place, and person and non-anxious. No focal neurological deficit noted.  Bilateral upper extremity tremors noted. Psychiatric: Normal mood and effect. EKG today is consistent with normal sinus rhythm 91 bpm with diffuse nonspecific ST abnormalities unchanged compared to the previous EKG from November last year. 1/2021 Echo reported   Left ventricular function is normal, EF is estimated at 55-60%. E/A reversal suggests abnormal LV relaxation. No regional wall motion abnormalities were detected. Mild mitral regurgitation is present. RVSP is 18 mmHg. No evidence of pericardial effusion. Pertinent records reviewed and discussed with patient and results are as follow:    Lab Results   Component Value Date    WBC 6.4 05/06/2021    HGB 13.8 05/06/2021    HCT 42.5 05/06/2021     05/06/2021     Lab Results   Component Value Date    CHOL 239 08/18/2021    TRIG 238 08/18/2021    HDL 85 (A) 08/18/2021    LDLCALC 106 08/18/2021     Lab Results   Component Value Date    BUN 15 08/18/2021    CREATININE 0.8 08/18/2021     08/18/2021    K 4.1 08/18/2021     Lab Results   Component Value Date    INR 1.10 12/05/2020     ASSESSMENT/PLAN:    1. Palpitations  Assessment & Plan:  Palpitations are associated with profound fatigue and weakness but denies any syncope or near syncope.   We will continue with monitoring for 2 weeks to evaluate this symptom further and follow-up. Orders:  -     Cardiac event monitor; Future  2. Coronary artery disease involving native coronary artery of native heart without angina pectoris  Assessment & Plan:  Atypical symptoms. cardiac in etiology. Continue aggressive risk factor modification for primary prevention. 3. Heart palpitations  -     EKG 12 Lead  4. Other forms of angina pectoris [facility])  Assessment & Plan:  Atypical by description probably cervical radiculopathy. Do not recommend further cardiac work-up at this point. Orders:  -     Cardiac event monitor; Future  5. Pure hypercholesterolemia  Assessment & Plan:  Patient is on fish oil. Last LDL was 106. She has statin intolerance. Continue current cardiovascular medications which have been reviewed and discussed individually with you. 2 weeks of continuous cardiac monitoring to evaluate symptoms. Follow-up in 4 weeks, sooner if needed. An electronic signature was used to authenticate this note.     --Kinjal Koch MD

## 2022-02-23 NOTE — PROGRESS NOTES
Applied @ 1030, 14days holter w/monitor# [id number] for Dx ofpalpitations . Educated pt on proper holter usage; how to keep sx diary; & when to bring monitor back to office. Pt voiced understanding. Holter order,including monitor & card#, & time started, to front nurse's station in 's in-box.

## 2022-03-02 ENCOUNTER — HOSPITAL ENCOUNTER (OUTPATIENT)
Age: 87
Discharge: HOME | End: 2022-03-02
Payer: MEDICARE

## 2022-03-02 PROCEDURE — U0005 INFEC AGEN DETEC AMPLI PROBE: HCPCS

## 2022-03-02 PROCEDURE — U0003 INFECTIOUS AGENT DETECTION BY NUCLEIC ACID (DNA OR RNA); SEVERE ACUTE RESPIRATORY SYNDROME CORONAVIRUS 2 (SARS-COV-2) (CORONAVIRUS DISEASE [COVID-19]), AMPLIFIED PROBE TECHNIQUE, MAKING USE OF HIGH THROUGHPUT TECHNOLOGIES AS DESCRIBED BY CMS-2020-01-R: HCPCS

## 2022-03-03 LAB
SARS-COV-2: NOT DETECTED
SOURCE: NORMAL

## 2022-03-07 ENCOUNTER — ANESTHESIA EVENT (OUTPATIENT)
Dept: OPERATING ROOM | Age: 87
End: 2022-03-07
Payer: MEDICARE

## 2022-03-07 NOTE — ANESTHESIA PRE PROCEDURE
Department of Anesthesiology  Preprocedure Note       Name:  Sean Tate   Age:  81 y.o.  :  1935                                          MRN:  [id number]         Date:  3/7/2022      Surgeon: Gayatri Delgado):  Citlaly So MD    Procedure: Procedure(s):  LEFT EYE CATARACT EXTRACTION    Medications prior to admission:   Prior to Admission medications    Medication Sig Start Date End Date Taking?  Authorizing Provider   prednisoLONE acetate (PRED MILD) 0.12 % ophthalmic suspension 1 drop 2 times daily Using until 2022    Historical Provider, MD   Cyanocobalamin (VITAMIN B 12) 500 MCG TABS Take 1,000 mg by mouth daily    Historical Provider, MD   Omega-3 Fatty Acids (OMEGA-3 FISH OIL PO) Take by mouth daily    Historical Provider, MD   clopidogrel (PLAVIX) 75 MG tablet Take 1 tablet by mouth daily 21  Andrea Russell MD   topiramate (TOPAMAX) 25 MG tablet Take 25 mg by mouth 2 times daily     Historical Provider, MD   Multiple Vitamins-Minerals (PRESERVISION AREDS) CAPS Take 1 capsule by mouth 2 times daily    Historical Provider, MD   celecoxib (CELEBREX) 200 MG capsule Take 200 mg by mouth daily    Historical Provider, MD   Cholecalciferol (VITAMIN D-3 PO) Take by mouth daily     Historical Provider, MD       Current medications:    Current Outpatient Medications   Medication Sig Dispense Refill    prednisoLONE acetate (PRED MILD) 0.12 % ophthalmic suspension 1 drop 2 times daily Using until 2022      Cyanocobalamin (VITAMIN B 12) 500 MCG TABS Take 1,000 mg by mouth daily      Omega-3 Fatty Acids (OMEGA-3 FISH OIL PO) Take by mouth daily      clopidogrel (PLAVIX) 75 MG tablet Take 1 tablet by mouth daily 90 tablet 3    topiramate (TOPAMAX) 25 MG tablet Take 25 mg by mouth 2 times daily       Multiple Vitamins-Minerals (PRESERVISION AREDS) CAPS Take 1 capsule by mouth 2 times daily      celecoxib (CELEBREX) 200 MG capsule Take 200 mg by mouth daily      Cholecalciferol (VITAMIN D-3 PO) Take by mouth daily        No current facility-administered medications for this visit. 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 Normal perfusion in the distribution of all coronaries, normal LV size and function, EF 70%.  H/O echocardiogram 04/29/2014    EF 50-55%, mild TR, doppler flow pattern is suggestive of impaired LV relaxation    H/O echocardiogram 01/14/2021    EF 55-60%. E/A reversal suggests abnormal LV relaxation. Mild MR.    H/O exercise stress test 05/1993    Heart murmur     Heart palpitations     Hyperlipidemia     MVP (mitral valve prolapse)     Obesity        Past Surgical History:        Procedure Laterality Date    CATARACT REMOVAL Right 2/11/2022    RIGHT EYE CATARACT EXTRACTION WITH INTRAOCCULAR LENS IMPLANT performed by David Lopez MD at [facility] OF [facility]TERUS      TONSILLECTOMY      VAGINAL PROLAPSE REPAIR  11/04/2019       Social History:    Social History     Tobacco Use    Smoking status: Never Smoker    Smokeless tobacco: Never Used   Substance Use Topics    Alcohol use:  No                                Counseling given: Not Answered      Vital Signs (Current): There were no vitals filed for this visit. BP Readings from Last 3 Encounters:   02/23/22 110/64   02/11/22 106/69   02/11/22 103/64       NPO Status:                                                                                 BMI:   Wt Readings from Last 3 Encounters:   02/23/22 163 lb (73.9 kg)   02/11/22 164 lb (74.4 kg)   11/22/21 161 lb (73 kg)     There is no height or weight on file to calculate BMI.    CBC:   Lab Results   Component Value Date    WBC 6.4 05/06/2021    RBC 4.59 05/06/2021    HGB 13.8 05/06/2021    HCT 42.5 05/06/2021    MCV 92.6 05/06/2021    RDW 12.6 05/06/2021     05/06/2021       CMP:   Lab Results   Component Value Date     08/18/2021    K 4.1 08/18/2021     08/18/2021    CO2 24 05/06/2021    BUN 15 08/18/2021    CREATININE 0.8 08/18/2021    GFRAA >60 05/06/2021    LABGLOM >60 05/06/2021    GLUCOSE 94 08/18/2021    PROT 7.1 05/06/2021    CALCIUM 9.6 08/18/2021    BILITOT 0.4 08/18/2021    ALKPHOS 69 08/18/2021    AST 14 08/18/2021    ALT 9 08/18/2021       POC Tests: No results for input(s): POCGLU, POCNA, POCK, POCCL, POCBUN, POCHEMO, POCHCT in the last 72 hours.     Coags:   Lab Results   Component Value Date    PROTIME 12.6 12/05/2020    INR 1.10 12/05/2020    APTT 40.4 12/05/2020       HCG (If Applicable): No results found for: PREGTESTUR, PREGSERUM, HCG, HCGQUANT     ABGs: No results found for: PHART, PO2ART, TVK1YJG, HLK0GZC, BEART, M4JSDVRC     Type & Screen (If Applicable):  No results found for: LABABO, LABRH    Drug/Infectious Status (If Applicable):  No results found for: HIV, HEPCAB    COVID-19 Screening (If Applicable):   Lab Results   Component Value Date    COVID19 NOT DETECTED 03/02/2022           Anesthesia Evaluation  Patient summary reviewed  Airway:         Dental:          Pulmonary:   (+) COPD:                             Cardiovascular:    (+) valvular problems/murmurs: MVP, CAD:, hyperlipidemia      ECG reviewed      Echocardiogram reviewed    Cleared by cardiology           ROS comment:  Summary   Left ventricular function is normal, EF is estimated at 55-60%. E/A reversal suggests abnormal LV relaxation. No regional wall motion abnormalities were detected. Mild mitral regurgitation is present. RVSP is 18 mmHg. No evidence of pericardial effusion. Signature      ------------------------------------------------------------------   Electronically signed by Tesfaye Wilson MD   (Interpreting physician) on 01/15/2021 at 11:26 AM   ------------------------------------------------------------------   Sinus  Rhythm   -Old anterior infarct. ABNORMAL   2021    Latoya Connelly was evaluated from a cardiac standpoint for her surgery or procedure and based on her history, diagnosis, recent cardiac testing, she is considered a low risk candidate for any la nena-operative cardiac complications.     Patients with known CAD with moderate or high risk should have surgical procedures done where they have access to invasive cardiology services if emergently needed.     Antiplatelet/anticoagulant therapy can be held prior to the surgery or procedure at the discretion of the surgeon to be resumed as soon as possible if held.     Please call with any further questions.     Respectfully,      Julienne Thurston MD, [facility] [place]  MSK/bl     This cardiac clearance is good for 6 months from 1/22/2022 unless new cardiac symptoms arise.          Revision History      Revised by Jojo Reina on 1/22/2022 at 8:07 AM (current version)  Created by Julienne Thurston MD on 1/22/2022 at 7:59 AM           Neuro/Psych:   (+) depression/anxiety              ROS comment: tremors GI/Hepatic/Renal:             Endo/Other:    (+) blood dyscrasia: anticoagulation therapy:., .                 Abdominal:             Vascular:           Other Findings:               Anesthesia Plan      MAC ASA 3     (Chart review 3/7/22)  Induction: intravenous.           Plan discussed with surgical team.                  HAZEL Blevins - CRNA   3/7/2022

## 2022-03-07 NOTE — PROGRESS NOTES
Surgery Date: 3/7/22                                   Arrive at: 0600  Surgery time: 0730     If your arrival time is before 7 AM come in the emergency room entrance. If after 7 AM come in the main entrance. One visitor may accompany you to the hospital.  Both of you must wear a mask and be free of covid symptoms. 1. Do not eat or drink anything after midnight - unless instructed by your doctor prior to surgery. This includes                  no water, chewing gum or mints. 2. Follow your directions as prescribed by the doctor for your procedure and medications. Take the following medications with a small sip of water the morning of: none               3. Check with your Doctor regarding stopping Plavix, Coumadin, Lovenox, Effient, Pradaxa, Xarelto, Fragmin or other blood thinners and                  follow their instructions. 4. Do not smoke and do not drink any alcoholic beverages 24 hours prior to surgery. 5. You may brush your teeth and gargle the morning of surgery. DO NOT SWALLOW WATER  6. Please wear simple, loose fitting clothing to the hospital.  Wilmertherese Gray not bring valuables (money, credit cards, checkbooks, etc.) Do not wear any                  makeup (including no eye makeup) or nail polish on your fingers or toes. 7.  DO NOT wear any jewelry or piercings on day of surgery. All body piercing jewelry must be removed. 8.  Take a shower the night before or morning of your procedure, do not apply any lotion, oil or powder. 9. If you have dentures, they will be removed before going to the OR; we will provide you a container. If you wear contact lenses or glasses,                 they will be removed; please bring a case for them. 10. If you  have a Living Will and Durable Power of  for Healthcare, please bring in a copy.            11. Please bring picture ID,  insurance card, paperwork from the doctors office    (H & P, Consent, & card for implantable devices). 12. You MUST make arrangements for a responsible adult to take you home after your surgery and be able to check on you every couple                  hours for the day. You will not be allowed to leave alone or drive yourself home. It is strongly suggested someone stay with you the first 24                  hrs. Your surgery will be cancelled if you do not have a ride home. 13. Wear a mask covering your nose & mouth when entering the hospital. Have your covid-19 test performed within 10 days of your                 surgery. Quarantine yourself after the test until after your surgery. Please call [phone] with any questions.

## 2022-03-08 ENCOUNTER — HOSPITAL ENCOUNTER (OUTPATIENT)
Age: 87
Setting detail: OUTPATIENT SURGERY
Discharge: HOME | End: 2022-03-08
Attending: OPHTHALMOLOGY | Admitting: OPHTHALMOLOGY
Payer: MEDICARE

## 2022-03-08 ENCOUNTER — ANESTHESIA (OUTPATIENT)
Dept: OPERATING ROOM | Age: 87
End: 2022-03-08
Payer: MEDICARE

## 2022-03-08 VITALS
DIASTOLIC BLOOD PRESSURE: 64 MMHG | OXYGEN SATURATION: 99 % | RESPIRATION RATE: 16 BRPM | SYSTOLIC BLOOD PRESSURE: 110 MMHG

## 2022-03-08 VITALS
DIASTOLIC BLOOD PRESSURE: 71 MMHG | TEMPERATURE: 97.5 F | WEIGHT: 163 LBS | SYSTOLIC BLOOD PRESSURE: 123 MMHG | OXYGEN SATURATION: 98 % | HEIGHT: 64 IN | RESPIRATION RATE: 16 BRPM | HEART RATE: 59 BPM | BODY MASS INDEX: 27.83 KG/M2

## 2022-03-08 PROBLEM — H25.12 AGE-RELATED NUCLEAR CATARACT OF LEFT EYE: Status: ACTIVE | Noted: 2022-03-08

## 2022-03-08 PROCEDURE — 3600000004 HC SURGERY LEVEL 4 BASE: Performed by: OPHTHALMOLOGY

## 2022-03-08 PROCEDURE — 2580000003 HC RX 258: Performed by: OPHTHALMOLOGY

## 2022-03-08 PROCEDURE — 6370000000 HC RX 637 (ALT 250 FOR IP): Performed by: OPHTHALMOLOGY

## 2022-03-08 PROCEDURE — V2632 POST CHMBR INTRAOCULAR LENS: HCPCS | Performed by: OPHTHALMOLOGY

## 2022-03-08 PROCEDURE — 7100000010 HC PHASE II RECOVERY - FIRST 15 MIN: Performed by: OPHTHALMOLOGY

## 2022-03-08 PROCEDURE — 6360000002 HC RX W HCPCS: Performed by: OPHTHALMOLOGY

## 2022-03-08 PROCEDURE — 6360000002 HC RX W HCPCS

## 2022-03-08 PROCEDURE — 2500000003 HC RX 250 WO HCPCS: Performed by: OPHTHALMOLOGY

## 2022-03-08 PROCEDURE — 3600000014 HC SURGERY LEVEL 4 ADDTL 15MIN: Performed by: OPHTHALMOLOGY

## 2022-03-08 PROCEDURE — 7100000011 HC PHASE II RECOVERY - ADDTL 15 MIN: Performed by: OPHTHALMOLOGY

## 2022-03-08 PROCEDURE — 3700000000 HC ANESTHESIA ATTENDED CARE: Performed by: OPHTHALMOLOGY

## 2022-03-08 PROCEDURE — 2500000003 HC RX 250 WO HCPCS

## 2022-03-08 PROCEDURE — 3700000001 HC ADD 15 MINUTES (ANESTHESIA): Performed by: OPHTHALMOLOGY

## 2022-03-08 PROCEDURE — 2709999900 HC NON-CHARGEABLE SUPPLY: Performed by: OPHTHALMOLOGY

## 2022-03-08 DEVICE — AKREOS AO MICRO INCISION LENS +0.00D
Type: IMPLANTABLE DEVICE | Site: EYE | Status: FUNCTIONAL
Brand: AKREOS® AO IOL

## 2022-03-08 RX ORDER — MOXIFLOXACIN 5 MG/ML
SOLUTION/ DROPS OPHTHALMIC
Status: COMPLETED | OUTPATIENT
Start: 2022-03-08 | End: 2022-03-08

## 2022-03-08 RX ORDER — SODIUM CHLORIDE 0.9 % (FLUSH) 0.9 %
5-40 SYRINGE (ML) INJECTION PRN
Status: DISCONTINUED | OUTPATIENT
Start: 2022-03-08 | End: 2022-03-08 | Stop reason: HOSPADM

## 2022-03-08 RX ORDER — LIDOCAINE HYDROCHLORIDE 20 MG/ML
INJECTION, SOLUTION EPIDURAL; INFILTRATION; INTRACAUDAL; PERINEURAL PRN
Status: DISCONTINUED | OUTPATIENT
Start: 2022-03-08 | End: 2022-03-08 | Stop reason: SDUPTHER

## 2022-03-08 RX ORDER — TROPICAMIDE 10 MG/ML
1 SOLUTION/ DROPS OPHTHALMIC SEE ADMIN INSTRUCTIONS
Status: DISCONTINUED | OUTPATIENT
Start: 2022-03-08 | End: 2022-03-08 | Stop reason: HOSPADM

## 2022-03-08 RX ORDER — PHENYLEPHRINE HCL 2.5 %
1 DROPS OPHTHALMIC (EYE) SEE ADMIN INSTRUCTIONS
Status: DISCONTINUED | OUTPATIENT
Start: 2022-03-08 | End: 2022-03-08 | Stop reason: HOSPADM

## 2022-03-08 RX ORDER — CYCLOPENTOLATE HYDROCHLORIDE 10 MG/ML
1 SOLUTION/ DROPS OPHTHALMIC SEE ADMIN INSTRUCTIONS
Status: DISCONTINUED | OUTPATIENT
Start: 2022-03-08 | End: 2022-03-08 | Stop reason: HOSPADM

## 2022-03-08 RX ORDER — PROPOFOL 10 MG/ML
INJECTION, EMULSION INTRAVENOUS PRN
Status: DISCONTINUED | OUTPATIENT
Start: 2022-03-08 | End: 2022-03-08 | Stop reason: SDUPTHER

## 2022-03-08 RX ORDER — TETRACAINE HYDROCHLORIDE 5 MG/ML
SOLUTION OPHTHALMIC
Status: COMPLETED | OUTPATIENT
Start: 2022-03-08 | End: 2022-03-08

## 2022-03-08 RX ORDER — SODIUM CHLORIDE, SODIUM LACTATE, POTASSIUM CHLORIDE, CALCIUM CHLORIDE 600; 310; 30; 20 MG/100ML; MG/100ML; MG/100ML; MG/100ML
INJECTION, SOLUTION INTRAVENOUS CONTINUOUS
Status: DISCONTINUED | OUTPATIENT
Start: 2022-03-08 | End: 2022-03-08 | Stop reason: HOSPADM

## 2022-03-08 RX ORDER — BETAMETHASONE SODIUM PHOSPHATE AND BETAMETHASONE ACETATE 3; 3 MG/ML; MG/ML
INJECTION, SUSPENSION INTRA-ARTICULAR; INTRALESIONAL; INTRAMUSCULAR; SOFT TISSUE
Status: COMPLETED | OUTPATIENT
Start: 2022-03-08 | End: 2022-03-08

## 2022-03-08 RX ORDER — LIDOCAINE HYDROCHLORIDE 20 MG/ML
INJECTION, SOLUTION EPIDURAL; INFILTRATION; INTRACAUDAL; PERINEURAL
Status: COMPLETED | OUTPATIENT
Start: 2022-03-08 | End: 2022-03-08

## 2022-03-08 RX ADMIN — PHENYLEPHRINE HYDROCHLORIDE 1 DROP: 25 SOLUTION/ DROPS OPHTHALMIC at 06:26

## 2022-03-08 RX ADMIN — SODIUM CHLORIDE, POTASSIUM CHLORIDE, SODIUM LACTATE AND CALCIUM CHLORIDE: 600; 310; 30; 20 INJECTION, SOLUTION INTRAVENOUS at 06:40

## 2022-03-08 RX ADMIN — PHENYLEPHRINE HYDROCHLORIDE 1 DROP: 25 SOLUTION/ DROPS OPHTHALMIC at 06:49

## 2022-03-08 RX ADMIN — CYCLOPENTOLATE HYDROCHLORIDE 1 DROP: 10 SOLUTION/ DROPS OPHTHALMIC at 07:06

## 2022-03-08 RX ADMIN — SODIUM CHLORIDE, PRESERVATIVE FREE 5 ML: 5 INJECTION INTRAVENOUS at 06:40

## 2022-03-08 RX ADMIN — TROPICAMIDE 1 DROP: 10 SOLUTION/ DROPS OPHTHALMIC at 07:05

## 2022-03-08 RX ADMIN — CYCLOPENTOLATE HYDROCHLORIDE 1 DROP: 10 SOLUTION/ DROPS OPHTHALMIC at 06:26

## 2022-03-08 RX ADMIN — PHENYLEPHRINE HYDROCHLORIDE 1 DROP: 25 SOLUTION/ DROPS OPHTHALMIC at 07:05

## 2022-03-08 RX ADMIN — LIDOCAINE HYDROCHLORIDE 100 MG: 20 INJECTION, SOLUTION EPIDURAL; INFILTRATION; INTRACAUDAL; PERINEURAL at 07:34

## 2022-03-08 RX ADMIN — CYCLOPENTOLATE HYDROCHLORIDE 1 DROP: 10 SOLUTION/ DROPS OPHTHALMIC at 06:49

## 2022-03-08 RX ADMIN — TROPICAMIDE 1 DROP: 10 SOLUTION/ DROPS OPHTHALMIC at 06:26

## 2022-03-08 RX ADMIN — TROPICAMIDE 1 DROP: 10 SOLUTION/ DROPS OPHTHALMIC at 06:49

## 2022-03-08 RX ADMIN — PROPOFOL 70 MG: 10 INJECTION, EMULSION INTRAVENOUS at 07:34

## 2022-03-08 ASSESSMENT — PAIN SCALES - GENERAL
PAINLEVEL_OUTOF10: 0
PAINLEVEL_OUTOF10: 0

## 2022-03-08 ASSESSMENT — PULMONARY FUNCTION TESTS
PIF_VALUE: -13
PIF_VALUE: -14
PIF_VALUE: -15
PIF_VALUE: -15
PIF_VALUE: -14
PIF_VALUE: -15
PIF_VALUE: -12
PIF_VALUE: -14
PIF_VALUE: -12
PIF_VALUE: -14
PIF_VALUE: -13
PIF_VALUE: -15
PIF_VALUE: -14
PIF_VALUE: -14
PIF_VALUE: -15
PIF_VALUE: -15
PIF_VALUE: -14
PIF_VALUE: -15
PIF_VALUE: -13
PIF_VALUE: -15

## 2022-03-08 ASSESSMENT — PAIN - FUNCTIONAL ASSESSMENT: PAIN_FUNCTIONAL_ASSESSMENT: 0-10

## 2022-03-08 NOTE — ANESTHESIA PRE PROCEDURE
Department of Anesthesiology  Preprocedure Note       Name:  Priyanka Rogers   Age:  81 y.o.  :  1935                                          MRN:  [id number]         Date:  3/8/2022      Surgeon: Jluis Flanagan):  Perla Alvarenga MD    Procedure: Procedure(s):  LEFT EYE CATARACT EXTRACTION    Medications prior to admission:   Prior to Admission medications    Medication Sig Start Date End Date Taking? Authorizing Provider   prednisoLONE acetate (PRED MILD) 0.12 % ophthalmic suspension 1 drop 2 times daily Using until 2022    Historical Provider, MD   Cyanocobalamin (VITAMIN B 12) 500 MCG TABS Take 1,000 mg by mouth daily    Historical Provider, MD   Omega-3 Fatty Acids (OMEGA-3 FISH OIL PO) Take by mouth daily    Historical Provider, MD   clopidogrel (PLAVIX) 75 MG tablet Take 1 tablet by mouth daily 21  Peyman Sanders MD   topiramate (TOPAMAX) 25 MG tablet Take 25 mg by mouth 2 times daily     Historical Provider, MD   Multiple Vitamins-Minerals (PRESERVISION AREDS) CAPS Take 1 capsule by mouth 2 times daily    Historical Provider, MD   celecoxib (CELEBREX) 200 MG capsule Take 200 mg by mouth daily    Historical Provider, MD   Cholecalciferol (VITAMIN D-3 PO) Take by mouth daily     Historical Provider, MD       Current medications:    No current facility-administered medications for this visit. No current outpatient medications on file.      Facility-Administered Medications Ordered in Other Visits   Medication Dose Route Frequency Provider Last Rate Last Admin    lactated ringers infusion   IntraVENous Continuous Perla Alvarenga  mL/hr at 22 0640 New Bag at 22 0640    sodium chloride flush 0.9 % injection 5-40 mL  5-40 mL IntraVENous PRN Perla Alvarenga MD   5 mL at 22 0640    tropicamide (MYDRIACYL) 1 % ophthalmic solution 1 drop  1 drop Left Eye See Admin Instructions Perla Alvarenga MD   1 drop at 22 0705    phenylephrine (MYDFRIN) 2.5 % ophthalmic solution 1 drop  1 drop Left Eye See Admin Instructions Chanel Sosa MD   1 drop at 03/08/22 0705    cyclopentolate (CYCLOGYL) 1 % ophthalmic solution 1 drop  1 drop Left Eye See Admin Instructions Chanel Sosa MD   1 drop at 03/08/22 5029       Allergies: Allergies   Allergen Reactions    Lescol      GI Symptoms    Lipitor      Myalgia    Pcn [Penicillins]     Zocor [Simvastatin]      myalgia       Problem List:    Patient Active Problem List   Diagnosis Code    Hyperlipidemia E78.5    Palpitations R00.2    Anxiety F41.9    Chest pain R07.9    Tremors of nervous system R25.1    CAD, 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 Normal perfusion in the distribution of all coronaries, normal LV size and function, EF 70%.  H/O echocardiogram 04/29/2014    EF 50-55%, mild TR, doppler flow pattern is suggestive of impaired LV relaxation    H/O echocardiogram 01/14/2021    EF 55-60%. E/A reversal suggests abnormal LV relaxation.  Mild MR.    H/O exercise stress test 05/1993    Heart murmur     Heart palpitations     Hyperlipidemia     MVP (mitral valve prolapse)     Obesity        Past Surgical History:        Procedure Laterality Date    CATARACT REMOVAL Right 2/11/2022    RIGHT EYE CATARACT EXTRACTION WITH INTRAOCCULAR LENS IMPLANT performed by Chanel Sosa MD at [facility] OF UTERUS      TONSILLECTOMY      VAGINAL PROLAPSE REPAIR  11/04/2019       Social History:    Social History     Tobacco Use    Smoking status: Never Smoker    Smokeless tobacco: Never Used   Substance Use Topics    Alcohol use: No                                Counseling given: Not Answered      Vital Signs (Current): There were no vitals filed for this visit. BP Readings from Last 3 Encounters:   03/08/22 122/78   02/23/22 110/64   02/11/22 106/69       NPO Status:                                                                                 BMI:   Wt Readings from Last 3 Encounters:   03/07/22 163 lb (73.9 kg)   02/23/22 163 lb (73.9 kg)   02/11/22 164 lb (74.4 kg)     There is no height or weight on file to calculate BMI.    CBC:   Lab Results   Component Value Date    WBC 6.4 05/06/2021    RBC 4.59 05/06/2021    HGB 13.8 05/06/2021    HCT 42.5 05/06/2021    MCV 92.6 05/06/2021    RDW 12.6 05/06/2021     05/06/2021       CMP:   Lab Results   Component Value Date     08/18/2021    K 4.1 08/18/2021     08/18/2021    CO2 24 05/06/2021    BUN 15 08/18/2021    CREATININE 0.8 08/18/2021    GFRAA >60 05/06/2021    LABGLOM >60 05/06/2021    GLUCOSE 94 08/18/2021    PROT 7.1 05/06/2021    CALCIUM 9.6 08/18/2021    BILITOT 0.4 08/18/2021    ALKPHOS 69 08/18/2021    AST 14 08/18/2021    ALT 9 08/18/2021       POC Tests: No results for input(s): POCGLU, POCNA, POCK, POCCL, POCBUN, POCHEMO, POCHCT in the last 72 hours.     Coags:   Lab Results   Component Value Date    PROTIME 12.6 12/05/2020    INR 1.10 12/05/2020    APTT 40.4 12/05/2020       HCG (If Applicable): No results found for: PREGTESTUR, PREGSERUM, HCG, HCGQUANT     ABGs: No results found for: PHART, PO2ART, PJH6EGA, FER6LJP, BEART, O2EHJJGE     Type & Screen (If Applicable):  No results found for: LABABO, LABRH    Drug/Infectious Status (If Applicable):  No results found for: HIV, HEPCAB    COVID-19 Screening (If Applicable):   Lab Results   Component Value Date    COVID19 NOT DETECTED 03/02/2022           Anesthesia Evaluation  Patient summary reviewed  Airway: Mallampati: II  TM distance: >3 FB   Neck ROM: full  Mouth opening: < 3 FB Dental: normal exam         Pulmonary:   (+) COPD:                             Cardiovascular:    (+) valvular problems/murmurs: MVP, CAD:, hyperlipidemia      ECG reviewed      Echocardiogram reviewed    Cleared by cardiology           ROS comment:  Summary   Left ventricular function is normal, EF is estimated at 55-60%. E/A reversal suggests abnormal LV relaxation. No regional wall motion abnormalities were detected. Mild mitral regurgitation is present. RVSP is 18 mmHg. No evidence of pericardial effusion. Signature      ------------------------------------------------------------------   Electronically signed by Devon Ireland MD   (Interpreting physician) on 01/15/2021 at 11:26 AM   ------------------------------------------------------------------   Sinus  Rhythm   -Old anterior infarct.      ABNORMAL   2021    Salomon Pallas was evaluated from a cardiac standpoint for her surgery or procedure and based on her history, diagnosis, recent cardiac testing, she is considered a low risk candidate for any la nena-operative cardiac complications.     Patients with known CAD with moderate or high risk should have surgical procedures done where they have access to invasive cardiology services if emergently needed.     Antiplatelet/anticoagulant therapy can be held prior to the surgery or procedure at the discretion of the surgeon to be resumed as soon as possible if held.     Please call with any further questions.     Respectfully,      Mynor Johnson MD, [facility]  MSK/bl     This cardiac clearance is good for 6 months from 1/22/2022 unless new cardiac symptoms arise.          Revision History      Revised by Anny Carson on 1/22/2022 at 8:07 AM (current version)  Created by Mynor Johnson MD on 1/22/2022 at 7:59 AM           Neuro/Psych:   (+) depression/anxiety ROS comment: tremors GI/Hepatic/Renal:             Endo/Other:    (+) blood dyscrasia: anticoagulation therapy:., .                 Abdominal:             Vascular: Other Findings:               Anesthesia Plan      MAC     ASA 3     (Chart )  Induction: intravenous. Anesthetic plan and risks discussed with patient.       Plan discussed with surgical team.                  HAZEL Mendez - TISHA   3/8/2022

## 2022-03-08 NOTE — ADDENDUM NOTE
Addendum  created 03/08/22 0754 by HAZEL Franco CRNA    Intraprocedure Meds edited, Orders acknowledged in Narrator

## 2022-03-08 NOTE — PROGRESS NOTES
Reviewed discharge instructions with pt. And daughter. No questions/concerns at this time. Pt. Denies pain or nausea. VS stable. Pt. Ready to get dressed and be discharged home.

## 2022-03-08 NOTE — OP NOTE
ACCOUNT NUMBER:  PATIENT NAME: Kusum Alarcon  SURGEON:Jonathan Jackman MD   [id number] [address] [facility] [place] [facility]    OPERATIVE REPORT          DATE OF OPERATION: _3/8/2022_  PREOPERTIVE DIAGNOIS:  CataractOS eye. POSTOPERTIVE DIAGNOSIS:  Cataract_OS eye. PROCEDURE:  Phacoemulsification of Cataract with intraocular lens      implant _OS eye. ANESTHESIA:  Monitored anesthesia care with 2% Xylocaine in a       retrobullar injection. DESCRIPTION OF PROCEDURE:  Under adequate local anesthesia, a lid speculum was placed in the eye to expose the globe. Conjunctival flap was developed from 10 to 12 oclock position. Hemostasis was obtained using wet-field cautery. A 69-Bever blade was than utilized to make the initial scleral incision to a mid depth. Using a 55/22 blade a shelved incision was made into the anterior chamber. Paracentesis site was made at the 2 oclock position with a 75-Bever blade. Healon was instilled and anterior capsuiorrhexis was performed. Hydrodilssection with BSS was carried out. Utilizing the phaco-emulsifier, the phacoemulsification of the cataract was accomplished. Using the irrigation-aspiration unit the remaining cortical material was removed. The intraocular lens was inspected and inserted into the bag with no difficulty after healon was instilled in the anterior chamber. The healon was removed with irrigation-aspiration unit and the wound was inspected to be self-sealing. The conjunctiva flap was then reapproximated with cautery. Sub-Tenon injection of 0.5 ml of Celestone and 0.5 ml Ancef was given. NeoDecadron was placed in the cul-de-sac. The patients eye was patched and shielded. The patient was returned to recovery in satisfactory condition with no complications from surgery or anesthesia.     The patient was discharged on the same day with home-going instructions and no bending, lifting, or straining, The patient is to keep the patch on at all times and follow up in my

## 2022-03-08 NOTE — PROGRESS NOTES
Patient returned to room from [address] [place] [address]. Bed brakes applied and VS obtained. See flow sheet. Patient A/O x4. Drink and snack offered. Dressing to left eye dry/intact with no drainage. Daughter at bedside. Will continue to monitor. Call light in reach.

## 2022-03-15 ENCOUNTER — OFFICE VISIT (OUTPATIENT)
Dept: CARDIOLOGY CLINIC | Age: 87
End: 2022-03-15
Payer: MEDICARE

## 2022-03-15 VITALS
BODY MASS INDEX: 26.79 KG/M2 | HEIGHT: 65 IN | DIASTOLIC BLOOD PRESSURE: 64 MMHG | HEART RATE: 66 BPM | SYSTOLIC BLOOD PRESSURE: 118 MMHG | WEIGHT: 160.8 LBS

## 2022-03-15 DIAGNOSIS — R00.2 PALPITATIONS: Primary | ICD-10-CM

## 2022-03-15 DIAGNOSIS — I20.8 OTHER FORMS OF ANGINA PECTORIS (HCC): ICD-10-CM

## 2022-03-15 DIAGNOSIS — E78.00 PURE HYPERCHOLESTEROLEMIA: ICD-10-CM

## 2022-03-15 DIAGNOSIS — I25.10 CORONARY ARTERY DISEASE INVOLVING NATIVE CORONARY ARTERY OF NATIVE HEART WITHOUT ANGINA PECTORIS: ICD-10-CM

## 2022-03-15 PROCEDURE — 1036F TOBACCO NON-USER: CPT | Performed by: INTERNAL MEDICINE

## 2022-03-15 PROCEDURE — G8399 PT W/DXA RESULTS DOCUMENT: HCPCS | Performed by: INTERNAL MEDICINE

## 2022-03-15 PROCEDURE — 93244 EXT ECG>48HR<7D REV&INTERPJ: CPT | Performed by: INTERNAL MEDICINE

## 2022-03-15 PROCEDURE — 4040F PNEUMOC VAC/ADMIN/RCVD: CPT | Performed by: INTERNAL MEDICINE

## 2022-03-15 PROCEDURE — G8427 DOCREV CUR MEDS BY ELIG CLIN: HCPCS | Performed by: INTERNAL MEDICINE

## 2022-03-15 PROCEDURE — G8484 FLU IMMUNIZE NO ADMIN: HCPCS | Performed by: INTERNAL MEDICINE

## 2022-03-15 PROCEDURE — 99213 OFFICE O/P EST LOW 20 MIN: CPT | Performed by: INTERNAL MEDICINE

## 2022-03-15 PROCEDURE — 93248 EXT ECG>7D<15D REV&INTERPJ: CPT | Performed by: INTERNAL MEDICINE

## 2022-03-15 PROCEDURE — 1090F PRES/ABSN URINE INCON ASSESS: CPT | Performed by: INTERNAL MEDICINE

## 2022-03-15 PROCEDURE — G8417 CALC BMI ABV UP PARAM F/U: HCPCS | Performed by: INTERNAL MEDICINE

## 2022-03-15 PROCEDURE — 1123F ACP DISCUSS/DSCN MKR DOCD: CPT | Performed by: INTERNAL MEDICINE

## 2022-03-15 NOTE — PROGRESS NOTES
Patricia Thayer (:  1935) is a 81 y.o. female,     Chief Complaint   Patient presents with    Hyperlipidemia     Denies cardiac complaints. States COVID positive 2020. Weakness and fatique since that comes and goes. Holter monitor results.  Coronary Artery Disease     Patient is here for follow up for palpitations and fatigue. She reports chronic persistent fatigue since she had COVID-19 infection in 2020. She had a 2 weeks of cardiac monitoring done to evaluate the symptoms and she is here for follow-up. Allergies   Allergen Reactions    Lescol      GI Symptoms    Lipitor      Myalgia    Pcn [Penicillins]     Zocor [Simvastatin]      myalgia     Prior to Admission medications    Medication Sig Start Date End Date Taking?  Authorizing Provider   prednisoLONE acetate (PRED MILD) 0.12 % ophthalmic suspension 1 drop 2 times daily Using until 2022   Yes Historical Provider, MD   Cyanocobalamin (VITAMIN B 12) 500 MCG TABS Take 1,000 mg by mouth daily   Yes Historical Provider, MD   Omega-3 Fatty Acids (OMEGA-3 FISH OIL PO) Take by mouth daily   Yes Historical Provider, MD   topiramate (TOPAMAX) 25 MG tablet Take 25 mg by mouth 2 times daily    Yes Historical Provider, MD   Multiple Vitamins-Minerals (PRESERVISION AREDS) CAPS Take 1 capsule by mouth 2 times daily   Yes Historical Provider, MD   celecoxib (CELEBREX) 200 MG capsule Take 200 mg by mouth daily   Yes Historical Provider, MD   Cholecalciferol (VITAMIN D-3 PO) Take by mouth daily    Yes Historical Provider, MD   clopidogrel (PLAVIX) 75 MG tablet Take 1 tablet by mouth daily 21  Irma Clark MD     Past Medical History:   Diagnosis Date    Anxiety     Coronary artery disease involving native coronary artery of native heart without angina pectoris 2018    Ectatic vessel with sluggish blood flow  cath    Essential tremor     H/O cardiac catheterization 2004    Ectasia of cornaries, normal EF    H/O cardiovascular stress test 04/29/2014    Stress Cardiolite. Normal perfusion in the distribution of all coronaries, normal LV size and function, EF 70%.  H/O echocardiogram 04/29/2014    EF 50-55%, mild TR, doppler flow pattern is suggestive of impaired LV relaxation    H/O echocardiogram 01/14/2021    EF 55-60%. E/A reversal suggests abnormal LV relaxation. Mild MR.    H/O exercise stress test 05/1993    Heart murmur     Heart palpitations     Hyperlipidemia     MVP (mitral valve prolapse)     Obesity       Vitals:    03/15/22 1412   BP: 118/64   Pulse: 66   Weight: 160 lb 12.8 oz (72.9 kg)   Height: 5' 4.8\" (1.646 m)      Body mass index is 26.92 kg/m². Wt Readings from Last 3 Encounters:   03/15/22 160 lb 12.8 oz (72.9 kg)   03/07/22 163 lb (73.9 kg)   02/23/22 163 lb (73.9 kg)     2 weeks of cardiac monitoring done from February 23 to March 7 to evaluate fatigue and palpitations. Rhythm is predominantly normal sinus average rate of 77 bpm.  Minimum rate of 47 bpm occurred at 12:24 AM during sleep. Maximum rate was around 100 bpm.  Rare isolated 127 PVCs are noted without complex ventricular arrhythmias. Frequent PACs and short run of 3-5 beat atrial tachyarrhythmias noted without symptoms. Patient reported multiple episodes of skipped beats and palpitation during normal rate and rhythm. There are no long pauses or any evidence of atrial fibrillation or other complex arrhythmias. Findings are reviewed with patient in detail and multiple questions are addressed.     Pertinent records reviewed and discussed with patient and results are as follow:    Lab Results   Component Value Date    WBC 6.4 05/06/2021    HGB 13.8 05/06/2021    HCT 42.5 05/06/2021     05/06/2021     Lab Results   Component Value Date    CHOL 239 08/18/2021    TRIG 238 08/18/2021    HDL 85 (A) 08/18/2021    LDLCALC 106 08/18/2021     Lab Results   Component Value Date    BUN 15 08/18/2021    CREATININE 0.8 08/18/2021     08/18/2021    K 4.1 08/18/2021     Lab Results   Component Value Date    INR 1.10 12/05/2020     ASSESSMENT/PLAN:    1. CAD, coronary ectasia by cath 2004  2. Other forms of angina pectoris ([address]  -     Cardiac event monitor  3. Palpitations  -     Cardiac event monitor  4. Pure hypercholesterolemia    Counseled to exercise regularly to build strength and stamina as tolerated. Minimize chocolate, tea, alcohol and caffeine. Primary prevention is the goal by aggressive risk modification, healthy and therapeutic life style changes for cardiovascular risk reduction. Various goals are discussed and questions answered. Follow-up in 12 months with EKG, sooner if needed. On this date 3/15/2022 I have spent 15 minutes reviewing previous notes, test results and face to face with the patient discussing the diagnosis and importance of compliance with the treatment plan as well as documenting on the day of the visit. An electronic signature was used to authenticate this note.     --Valdo Castañeda MD

## 2022-03-15 NOTE — PATIENT INSTRUCTIONS
Counseled to exercise regularly to build strength and stamina as tolerated. Minimize chocolate, tea, alcohol and caffeine. Primary prevention is the goal by aggressive risk modification, healthy and therapeutic life style changes for cardiovascular risk reduction. Various goals are discussed and questions answered. Follow-up in 12 months with EKG, sooner if needed.

## 2022-03-30 LAB
ALBUMIN SERPL-MCNC: 4.7 G/DL
ALP BLD-CCNC: 73 U/L
ALT SERPL-CCNC: 12 U/L
ANION GAP SERPL CALCULATED.3IONS-SCNC: 2.4 MMOL/L
AST SERPL-CCNC: 16 U/L
BASOPHILS ABSOLUTE: 0.1 /ΜL
BASOPHILS RELATIVE PERCENT: 0.8 %
BILIRUB SERPL-MCNC: 0.3 MG/DL (ref 0.1–1.4)
BUN BLDV-MCNC: 16 MG/DL
CALCIUM SERPL-MCNC: 9.3 MG/DL
CHLORIDE BLD-SCNC: 105 MMOL/L
CHOLESTEROL, TOTAL: 253 MG/DL
CHOLESTEROL/HDL RATIO: ABNORMAL
CO2: 27 MMOL/L
CREAT SERPL-MCNC: 0.8 MG/DL
EOSINOPHILS ABSOLUTE: 0.4 /ΜL
EOSINOPHILS RELATIVE PERCENT: 5.2 %
GFR CALCULATED: 72
GLUCOSE BLD-MCNC: 95 MG/DL
HCT VFR BLD CALC: 41.7 % (ref 36–46)
HDLC SERPL-MCNC: 95 MG/DL (ref 35–70)
HEMOGLOBIN: 14 G/DL (ref 12–16)
LDL CHOLESTEROL CALCULATED: 132 MG/DL (ref 0–160)
LYMPHOCYTES ABSOLUTE: 2.5 /ΜL
LYMPHOCYTES RELATIVE PERCENT: 33.6 %
MCH RBC QN AUTO: 30.8 PG
MCHC RBC AUTO-ENTMCNC: 33.6 G/DL
MCV RBC AUTO: 91.9 FL
MONOCYTES ABSOLUTE: 0.6 /ΜL
MONOCYTES RELATIVE PERCENT: 7.7 %
NEUTROPHILS ABSOLUTE: 3.9 /ΜL
NEUTROPHILS RELATIVE PERCENT: 52.6 %
NONHDLC SERPL-MCNC: ABNORMAL MG/DL
PDW BLD-RTO: 11.7 %
PLATELET # BLD: 232 K/ΜL
PMV BLD AUTO: 10.1 FL
POTASSIUM SERPL-SCNC: 3.6 MMOL/L
RBC # BLD: 4.54 10^6/ΜL
SODIUM BLD-SCNC: 142 MMOL/L
TOTAL PROTEIN: 6.7
TRIGL SERPL-MCNC: 128 MG/DL
VLDLC SERPL CALC-MCNC: 95 MG/DL
WBC # BLD: 7.4 10^3/ML

## 2022-07-08 ENCOUNTER — INITIAL CONSULT (OUTPATIENT)
Dept: CARDIOLOGY CLINIC | Age: 87
End: 2022-07-08
Payer: MEDICARE

## 2022-07-08 VITALS
BODY MASS INDEX: 27.04 KG/M2 | SYSTOLIC BLOOD PRESSURE: 114 MMHG | WEIGHT: 158.4 LBS | DIASTOLIC BLOOD PRESSURE: 70 MMHG | HEIGHT: 64 IN | HEART RATE: 78 BPM

## 2022-07-08 DIAGNOSIS — R00.2 PALPITATIONS: Primary | ICD-10-CM

## 2022-07-08 DIAGNOSIS — R94.31 ABNORMAL ELECTROCARDIOGRAM (ECG) (EKG): ICD-10-CM

## 2022-07-08 PROCEDURE — 1090F PRES/ABSN URINE INCON ASSESS: CPT | Performed by: INTERNAL MEDICINE

## 2022-07-08 PROCEDURE — 1123F ACP DISCUSS/DSCN MKR DOCD: CPT | Performed by: INTERNAL MEDICINE

## 2022-07-08 PROCEDURE — G8427 DOCREV CUR MEDS BY ELIG CLIN: HCPCS | Performed by: INTERNAL MEDICINE

## 2022-07-08 PROCEDURE — 1036F TOBACCO NON-USER: CPT | Performed by: INTERNAL MEDICINE

## 2022-07-08 PROCEDURE — G8417 CALC BMI ABV UP PARAM F/U: HCPCS | Performed by: INTERNAL MEDICINE

## 2022-07-08 PROCEDURE — 93000 ELECTROCARDIOGRAM COMPLETE: CPT | Performed by: INTERNAL MEDICINE

## 2022-07-08 PROCEDURE — 99204 OFFICE O/P NEW MOD 45 MIN: CPT | Performed by: INTERNAL MEDICINE

## 2022-07-08 RX ORDER — CLOPIDOGREL BISULFATE 75 MG/1
75 TABLET ORAL DAILY
Qty: 90 TABLET | Refills: 3 | Status: SHIPPED | OUTPATIENT
Start: 2022-07-08 | End: 2023-07-08

## 2022-07-08 RX ORDER — PROPRANOLOL HYDROCHLORIDE 10 MG/1
10 TABLET ORAL 3 TIMES DAILY
Qty: 90 TABLET | Refills: 1 | Status: SHIPPED | OUTPATIENT
Start: 2022-07-08 | End: 2022-10-03 | Stop reason: SDUPTHER

## 2022-07-08 NOTE — PATIENT INSTRUCTIONS
Please be informed that if you contact our office outside of normal business hours the physician on call cannot help with any scheduling or rescheduling issues, procedure instruction questions or any type of medication issue. We advise you for any urgent/emergency that you go to the nearest emergency room! PLEASE CALL OUR OFFICE DURING NORMAL BUSINESS HOURS    Monday - Friday   8 am to 5 pm    [facility] [address]: [phone]    [place]:  [phone]    **It is YOUR responsibilty to bring medication bottles and/or updated medication list to [address] [place] [address]. This will allow us to better serve you and all your healthcare needs**    Please hold on to these instructions the  will call you within 1-9 business days when we receive authorization from your insurance. Treadmill Stress test    WHAT TO EXPECT:     The exercise stress test is a test used to provide information about how the heart responds to exertion. It involves walking on a treadmill at increasing levels of difficulty, while electrocardiogram, heart rate, and blood pressure are monitored. ? This test will take approximately 1 hours: Please arrive at the office 5-10 min before the scheduled testing time. ? Once you are taken back to the stress lab you will be asked to read and sign a consent form before proceeding with the test. At this time feel free to ask any question that you may have as the procedure is explained to you.   ? You will be attached to the EKG monitoring equipment, your blood pressure will be taken, and you will begin walking on the treadmill. The treadmill starts off slowly and every 3 minutes the treadmill speeds up and the elevation increases. The average person usually walks for a period of 6-8min. PREPARATION FOR TEST:    ? Eat a light meal such as juice and toast at least 2 hours prior to the procedure.    ? AVOID CAFFEINE 24 HOURS PRIOR TO THE TEST: Including coffee, Tea, Amisha and other soft drinks even those labeled  caffeine free or decaffeinated. ? Please wear loose comfortable clothing and comfortable walking shoes. Please wear a short sleeved shirt. ? Please shower or bathe and do not apply powder or lotion to the skin prior to testing, as the electrodes will adhere better giving us a clearer visual EKG recording.

## 2022-07-11 DIAGNOSIS — R53.83 FATIGUE, UNSPECIFIED TYPE: ICD-10-CM

## 2022-07-11 DIAGNOSIS — U07.1 COVID-19: ICD-10-CM

## 2022-07-11 DIAGNOSIS — R94.31 ABNORMAL ELECTROCARDIOGRAM (ECG) (EKG): ICD-10-CM

## 2022-07-11 DIAGNOSIS — R00.2 PALPITATIONS: Primary | ICD-10-CM

## 2022-07-11 NOTE — PROGRESS NOTES
Per 's last OV note on this pt, he wants her to have a GXT for chronotropic incompetence or any exertional arrhythmia induction. Clarified w/'s nurse Charo & she confirmed that he wants a reg. treadmill stress test. Thus this nurse cancelled the [employer]ucMed. stress test order in Epic & placed new order for GXT.

## 2022-07-12 ENCOUNTER — PROCEDURE VISIT (OUTPATIENT)
Dept: CARDIOLOGY CLINIC | Age: 87
End: 2022-07-12
Payer: MEDICARE

## 2022-07-12 VITALS
HEIGHT: 64 IN | WEIGHT: 158 LBS | SYSTOLIC BLOOD PRESSURE: 126 MMHG | DIASTOLIC BLOOD PRESSURE: 70 MMHG | HEART RATE: 68 BPM | BODY MASS INDEX: 26.98 KG/M2

## 2022-07-12 DIAGNOSIS — R53.83 FATIGUE, UNSPECIFIED TYPE: ICD-10-CM

## 2022-07-12 DIAGNOSIS — I25.10 CORONARY ARTERY DISEASE INVOLVING NATIVE CORONARY ARTERY OF NATIVE HEART WITHOUT ANGINA PECTORIS: ICD-10-CM

## 2022-07-12 DIAGNOSIS — R94.31 ABNORMAL ELECTROCARDIOGRAM (ECG) (EKG): ICD-10-CM

## 2022-07-12 DIAGNOSIS — U07.1 COVID-19: ICD-10-CM

## 2022-07-12 DIAGNOSIS — R00.2 PALPITATIONS: Primary | ICD-10-CM

## 2022-07-12 DIAGNOSIS — R00.0 TACHYCARDIA: ICD-10-CM

## 2022-07-12 PROCEDURE — 93015 CV STRESS TEST SUPVJ I&R: CPT | Performed by: INTERNAL MEDICINE

## 2022-07-21 ENCOUNTER — TELEPHONE (OUTPATIENT)
Dept: CARDIOLOGY CLINIC | Age: 87
End: 2022-07-21

## 2022-07-21 RX ORDER — MIDODRINE HYDROCHLORIDE 2.5 MG/1
2.5 TABLET ORAL 3 TIMES DAILY
Qty: 90 TABLET | Refills: 1 | Status: SHIPPED | OUTPATIENT
Start: 2022-07-21 | End: 2022-09-09 | Stop reason: ALTCHOICE

## 2022-07-21 NOTE — TELEPHONE ENCOUNTER
Dr No Lion reviewed and per Dr No Lion recommending patient continue propranolol as directed. Will send Midodrine 2.5 mg tablet to be taken three times daily to the pharmacy to help with patient blood pressure. Keep appointment as scheduled. Spoke with patient and patient is aware state she will pick medication up today. Also reports will go back to taking propranolol three times daily. Did advise patient to call the office with any further questions or concerns between now and scheduled follow up. Patient voiced understanding. Prescription for Midodrine 2.5 mg tablet tid sent to [facility] as requested by patient.

## 2022-07-21 NOTE — TELEPHONE ENCOUNTER
Returned phone call to patient. Patient states she has not felt well since she had covid a while ago but feels propranolol could be contributing to her not feeling well. She again stated her BP was 94/58 today. She states her PCP did stop topiramate and patient also reports she has been skipping mid day dose of propranolol the last several days and has not noticed any improvement in her symptoms. She isn't sure if its the medication or post covid that has her feeling so bad. She just wants to be sure she should continue taking propranolol with her blood pressure reading that low? Did send perfect serve message to Dr Cari Madsen to review and advise. Will await response.

## 2022-07-21 NOTE — TELEPHONE ENCOUNTER
Patient called she stated that yesterday at the grocery she felt like she was going to pass out   She feels the propanolol that she is taking   Is to strong bp today 92/54

## 2022-08-03 ASSESSMENT — ENCOUNTER SYMPTOMS
VOMITING: 0
COUGH: 0
BLOOD IN STOOL: 0
CHEST TIGHTNESS: 0
SHORTNESS OF BREATH: 0
EYE PAIN: 0
PHOTOPHOBIA: 0
ABDOMINAL PAIN: 0
CONSTIPATION: 0
BACK PAIN: 0
WHEEZING: 0
COLOR CHANGE: 0
NAUSEA: 0
DIARRHEA: 0

## 2022-09-09 ENCOUNTER — OFFICE VISIT (OUTPATIENT)
Dept: CARDIOLOGY CLINIC | Age: 87
End: 2022-09-09
Payer: MEDICARE

## 2022-09-09 VITALS
SYSTOLIC BLOOD PRESSURE: 130 MMHG | DIASTOLIC BLOOD PRESSURE: 66 MMHG | WEIGHT: 161 LBS | HEIGHT: 65 IN | HEART RATE: 73 BPM | BODY MASS INDEX: 26.82 KG/M2

## 2022-09-09 DIAGNOSIS — R00.2 PALPITATIONS: Primary | ICD-10-CM

## 2022-09-09 PROCEDURE — 1036F TOBACCO NON-USER: CPT | Performed by: INTERNAL MEDICINE

## 2022-09-09 PROCEDURE — 99213 OFFICE O/P EST LOW 20 MIN: CPT | Performed by: INTERNAL MEDICINE

## 2022-09-09 PROCEDURE — 1090F PRES/ABSN URINE INCON ASSESS: CPT | Performed by: INTERNAL MEDICINE

## 2022-09-09 PROCEDURE — G8417 CALC BMI ABV UP PARAM F/U: HCPCS | Performed by: INTERNAL MEDICINE

## 2022-09-09 PROCEDURE — 1123F ACP DISCUSS/DSCN MKR DOCD: CPT | Performed by: INTERNAL MEDICINE

## 2022-09-09 PROCEDURE — 93000 ELECTROCARDIOGRAM COMPLETE: CPT | Performed by: INTERNAL MEDICINE

## 2022-09-09 PROCEDURE — G8428 CUR MEDS NOT DOCUMENT: HCPCS | Performed by: INTERNAL MEDICINE

## 2022-09-09 ASSESSMENT — ENCOUNTER SYMPTOMS
COLOR CHANGE: 0
BLOOD IN STOOL: 0
COUGH: 0
DIARRHEA: 0
EYE PAIN: 0
ABDOMINAL PAIN: 0
WHEEZING: 0
CHEST TIGHTNESS: 0
PHOTOPHOBIA: 0
SHORTNESS OF BREATH: 0
VOMITING: 0
BACK PAIN: 0
NAUSEA: 0
CONSTIPATION: 0

## 2022-09-09 NOTE — PROGRESS NOTES
Electrophysiology FU Note      Reason for consultation:  Palpitations    Chief complaint :  Follow up for palpitations    Referring physician: Patricia Hopson      Primary care physician: Momo Prasad MD      History of Present Illness: This visit (9/9/2022)      Chief Complaint   Patient presents with    Follow-up     PT denies any new cardiac sx, no surgeries or procedures scheduled that she is aware of and no refills needed a t this. PT does not smoke or drink, does have a glass of tea 5-7 a week           Previous visit:     Patient is a 78-year-old female with a history of coronary artery disease, hyperlipidemia reports palpitations and shortness of breath with exertion. Patient reports at times she has no energy and sometimes she feels. Patient had an event monitor. Patient denies chest pain, dizziness or syncope. Patient does not drink or smoke. Patient drinks caffeine. Patient does eat chocolate. patient does get some exercise      Pastmedical history:   Past Medical History:   Diagnosis Date    Anxiety     Coronary artery disease involving native coronary artery of native heart without angina pectoris 03/20/2018    Ectatic vessel with sluggish blood flow 2004 cath    Essential tremor     H/O cardiac catheterization 1993, 2004    Ectasia of cornaries, normal EF    H/O cardiovascular stress test 04/29/2014    Stress Cardiolite. Normal perfusion in the distribution of all coronaries, normal LV size and function, EF 70%. H/O echocardiogram 04/29/2014    EF 50-55%, mild TR, doppler flow pattern is suggestive of impaired LV relaxation    H/O echocardiogram 01/14/2021    EF 55-60%. E/A reversal suggests abnormal LV relaxation.  Mild MR.    H/O exercise stress test 05/1993    Heart murmur     Heart palpitations     Hyperlipidemia     MVP (mitral valve prolapse)     Obesity     Tachycardia        Surgical history :   Past Surgical History:   Procedure Laterality Date    CATARACT REMOVAL Right 2/11/2022    RIGHT EYE CATARACT EXTRACTION WITH INTRAOCCULAR LENS IMPLANT performed by Chris Gomez MD at [address]  Left 3/8/2022    LEFT EYE CATARACT EXTRACTION WITH INTRAOCULARL LENS IMPLANT performed by Chris Gomez MD at [address]  11/04/2019       Family history:   Family History   Problem Relation Age of Onset    Cancer Mother     Heart Attack Father     Cancer Father        Social history :  reports that she has never smoked. She has never used smokeless tobacco. She reports that she does not drink alcohol and does not use drugs. Allergies   Allergen Reactions    Lescol      GI Symptoms    Lipitor      Myalgia    Pcn [Penicillins]     Zocor [Simvastatin]      myalgia       Current Outpatient Medications on File Prior to Visit   Medication Sig Dispense Refill    clopidogrel (PLAVIX) 75 MG tablet Take 1 tablet by mouth daily 90 tablet 3    propranolol (INDERAL) 10 MG tablet Take 1 tablet by mouth 3 times daily 90 tablet 1    Cyanocobalamin (VITAMIN B 12) 500 MCG TABS Take 1,000 mg by mouth daily      Omega-3 Fatty Acids (OMEGA-3 FISH OIL PO) Take by mouth daily      Multiple Vitamins-Minerals (PRESERVISION AREDS) CAPS Take 1 capsule by mouth 2 times daily      celecoxib (CELEBREX) 200 MG capsule Take 200 mg by mouth daily Pt taking every other day      Cholecalciferol (VITAMIN D-3 PO) Take by mouth daily       midodrine (PROAMATINE) 2.5 MG tablet Take 1 tablet by mouth in the morning and 1 tablet at noon and 1 tablet before bedtime.  (Patient not taking: Reported on 9/9/2022) 90 tablet 1    prednisoLONE acetate (PRED MILD) 0.12 % ophthalmic suspension 1 drop 2 times daily Using until March 2022 (Patient not taking: Reported on 9/9/2022)      topiramate (TOPAMAX) 25 MG tablet Take 25 mg by mouth 2 times daily  (Patient not taking: Reported on 9/9/2022)       No current facility-administered medications on file prior to visit. Review of Systems:   Review of Systems   Constitutional:  Negative for activity change, chills, fatigue and fever. HENT:  Negative for congestion, ear pain and tinnitus. Eyes:  Negative for photophobia, pain and visual disturbance. Respiratory:  Negative for cough, chest tightness, shortness of breath and wheezing. Cardiovascular:  Negative for chest pain, palpitations and leg swelling. Gastrointestinal:  Negative for abdominal pain, blood in stool, constipation, diarrhea, nausea and vomiting. Endocrine: Negative for cold intolerance and heat intolerance. Genitourinary:  Negative for dysuria, flank pain and hematuria. Musculoskeletal:  Positive for arthralgias. Negative for back pain, myalgias and neck stiffness. Skin:  Negative for color change and rash. Allergic/Immunologic: Negative for food allergies. Neurological:  Negative for dizziness, light-headedness, numbness and headaches. Hematological:  Does not bruise/bleed easily. Psychiatric/Behavioral:  Negative for agitation, behavioral problems and confusion. Examination:      Vitals:    09/09/22 1333   BP: 130/66   Pulse: 73   Weight: 161 lb (73 kg)   Height: 5' 5\" (1.651 m)        Body mass index is 26.79 kg/m². Physical Exam  Constitutional:       Appearance: Normal appearance. She is not ill-appearing. HENT:      Head: Normocephalic and atraumatic. Mouth/Throat:      Mouth: Mucous membranes are moist.   Eyes:      Conjunctiva/sclera: Conjunctivae normal.   Cardiovascular:      Rate and Rhythm: Normal rate and regular rhythm. Heart sounds: No murmur heard. Pulmonary:      Effort: Pulmonary effort is normal.      Breath sounds: No rales. Abdominal:      General: Abdomen is flat. Palpations: Abdomen is soft. Musculoskeletal:         General: No tenderness. Normal range of motion.       Cervical back: Normal range of motion. Right lower leg: No edema. Left lower leg: No edema. Skin:     General: Skin is warm and dry. Neurological:      General: No focal deficit present. Mental Status: She is alert and oriented to person, place, and time. CBC:   Lab Results   Component Value Date/Time    WBC 7.4 03/30/2022 12:00 AM    HGB 14 03/30/2022 12:00 AM    HCT 41.7 03/30/2022 12:00 AM     03/30/2022 12:00 AM     Lipids:  Lab Results   Component Value Date    CHOL 253 03/30/2022    TRIG 128 03/30/2022    HDL 95 (A) 03/30/2022    LDLCALC 132 03/30/2022     PT/INR:   Lab Results   Component Value Date/Time    INR 1.10 12/05/2020 06:40 PM        BMP:    Lab Results   Component Value Date     03/30/2022    K 3.6 03/30/2022     03/30/2022    CO2 27 03/30/2022    BUN 16 03/30/2022     CMP:   Lab Results   Component Value Date    AST 16 03/30/2022    PROT 7.1 05/06/2021    BILITOT 0.3 03/30/2022    ALKPHOS 73 03/30/2022     TSH:    Lab Results   Component Value Date/Time    TSH 2.730 07/15/2020 12:00 AM       EKGINTERPRETATION - EKG Interpretation:  sinus rhythm      IMPRESSION / RECOMMENDATIONS:     Palpitations  Shortness of breath with exertion  Essential tremors  Non obstructive CAD - per patient  COVID in December 2020    Patient reports she feels very well on propranolol and the palpitations are much relieved. Patient has flutter feeling once since that medication is started and she is feeling overall good. She could not tolerate midodrine so she stopped. Her tremors are mildly improved she reports. Blood pressure is also improved. If patient continues to have episodes of palpitations and I will increase the propranolol to 20 mg 3 times daily but for now continue with the present medical therapy. Follow Up in 6 months      Thanks again for allowing me to participate in care of this patient. Please call me if you have any questions. With best regards.       Aries Walker Eveline Padgett MD, 9/9/2022 1:42 PM     Please note this report has been partially produced using speech recognition software and may contain errors related to that system including errors in grammar, punctuation, and spelling, as well as words and phrases that may be inappropriate. If there are any questions or concerns please feel free to contact the dictating provider for clarification.

## 2022-10-03 RX ORDER — PROPRANOLOL HYDROCHLORIDE 10 MG/1
10 TABLET ORAL 3 TIMES DAILY
Qty: 180 TABLET | Refills: 5 | Status: SHIPPED | OUTPATIENT
Start: 2022-10-03

## 2022-11-29 NOTE — PROGRESS NOTES
Electrophysiology Consult Note      Reason for consultation:  Palpitations    Chief complaint : Palpitations, shortness of breath    Referring physician: Vinicius Fernandez      Primary care physician: Juan Woods MD      History of Present Illness:     Patient is a 23-year-old female with a history of coronary artery disease, hyperlipidemia reports palpitations and shortness of breath with exertion. Patient reports at times she has no energy and sometimes she feels. Patient had an event monitor. Patient denies chest pain, dizziness or syncope. Patient does not drink or smoke. Patient drinks caffeine. Patient does eat chocolate. patient does get some exercise      Pastmedical history:   Past Medical History:   Diagnosis Date    Anxiety     Coronary artery disease involving native coronary artery of native heart without angina pectoris 03/20/2018    Ectatic vessel with sluggish blood flow 2004 cath    Essential tremor     H/O cardiac catheterization 1993, 2004    Ectasia of cornaries, normal EF    H/O cardiovascular stress test 04/29/2014    Stress Cardiolite. Normal perfusion in the distribution of all coronaries, normal LV size and function, EF 70%. H/O echocardiogram 04/29/2014    EF 50-55%, mild TR, doppler flow pattern is suggestive of impaired LV relaxation    H/O echocardiogram 01/14/2021    EF 55-60%. E/A reversal suggests abnormal LV relaxation.  Mild MR.    H/O exercise stress test 05/1993    Heart murmur     Heart palpitations     Hyperlipidemia     MVP (mitral valve prolapse)     Obesity        Surgical history :   Past Surgical History:   Procedure Laterality Date    CATARACT REMOVAL Right 2/11/2022    RIGHT EYE CATARACT EXTRACTION WITH INTRAOCCULAR LENS IMPLANT performed by Carlos Campos MD at [address] Left 3/8/2022    LEFT EYE CATARACT EXTRACTION WITH INTRAOCULARL LENS IMPLANT performed by Carlos Campos MD at [address] [facility] CHOLECYSTECTOMY      DILATION AND CURETTAGE OF UTERUS      TONSILLECTOMY      VAGINAL PROLAPSE REPAIR  11/04/2019       Family history:   Family History   Problem Relation Age of Onset    Cancer Mother     Heart Attack Father     Cancer Father        Social history :  reports that she has never smoked. She has never used smokeless tobacco. She reports that she does not drink alcohol and does not use drugs. Allergies   Allergen Reactions    Lescol      GI Symptoms    Lipitor      Myalgia    Pcn [Penicillins]     Zocor [Simvastatin]      myalgia       Current Outpatient Medications on File Prior to Visit   Medication Sig Dispense Refill    prednisoLONE acetate (PRED MILD) 0.12 % ophthalmic suspension 1 drop 2 times daily Using until March 2022      Cyanocobalamin (VITAMIN B 12) 500 MCG TABS Take 1,000 mg by mouth daily      Omega-3 Fatty Acids (OMEGA-3 FISH OIL PO) Take by mouth daily      clopidogrel (PLAVIX) 75 MG tablet Take 1 tablet by mouth daily 90 tablet 3    topiramate (TOPAMAX) 25 MG tablet Take 25 mg by mouth 2 times daily       Multiple Vitamins-Minerals (PRESERVISION AREDS) CAPS Take 1 capsule by mouth 2 times daily      celecoxib (CELEBREX) 200 MG capsule Take 200 mg by mouth daily Pt taking every other day      Cholecalciferol (VITAMIN D-3 PO) Take by mouth daily        No current facility-administered medications on file prior to visit. Review of Systems:   Review of Systems   Constitutional:  Positive for fatigue. Negative for activity change, chills and fever. HENT:  Negative for congestion, ear pain and tinnitus. Eyes:  Negative for photophobia, pain and visual disturbance. Respiratory:  Negative for cough, chest tightness, shortness of breath and wheezing. Cardiovascular:  Positive for palpitations. Negative for chest pain and leg swelling. Gastrointestinal:  Negative for abdominal pain, blood in stool, constipation, diarrhea, nausea and vomiting.    Endocrine: Negative for cold intolerance and heat intolerance. Genitourinary:  Negative for dysuria, flank pain and hematuria. Musculoskeletal:  Positive for arthralgias. Negative for back pain, myalgias and neck stiffness. Skin:  Negative for color change and rash. Allergic/Immunologic: Negative for food allergies. Neurological:  Negative for dizziness, light-headedness, numbness and headaches. Hematological:  Does not bruise/bleed easily. Psychiatric/Behavioral:  Negative for agitation, behavioral problems and confusion. Examination:      Vitals:    07/08/22 1109   BP: 114/70   Site: Left Upper Arm   Position: Sitting   Cuff Size: Medium Adult   Pulse: 78   Weight: 158 lb 6.4 oz (71.8 kg)   Height: 5' 4\" (1.626 m)        Body mass index is 27.19 kg/m². Physical Exam  Constitutional:       Appearance: Normal appearance. She is not ill-appearing. HENT:      Head: Normocephalic and atraumatic. Mouth/Throat:      Mouth: Mucous membranes are moist.   Eyes:      Conjunctiva/sclera: Conjunctivae normal.   Cardiovascular:      Rate and Rhythm: Normal rate and regular rhythm. Heart sounds: No murmur heard. Pulmonary:      Effort: Pulmonary effort is normal.      Breath sounds: No rales. Abdominal:      General: Abdomen is flat. Palpations: Abdomen is soft. Musculoskeletal:         General: No tenderness. Normal range of motion. Cervical back: Normal range of motion. Right lower leg: No edema. Left lower leg: No edema. Skin:     General: Skin is warm and dry. Neurological:      General: No focal deficit present. Mental Status: She is alert and oriented to person, place, and time.              CBC:   Lab Results   Component Value Date/Time    WBC 7.4 03/30/2022 12:00 AM    HGB 14 03/30/2022 12:00 AM    HCT 41.7 03/30/2022 12:00 AM     03/30/2022 12:00 AM     Lipids:  Lab Results   Component Value Date    CHOL 253 03/30/2022    TRIG 128 03/30/2022    HDL 95 (A) 03/30/2022 [address] 132 03/30/2022     PT/INR:   Lab Results   Component Value Date/Time    INR 1.10 12/05/2020 06:40 PM        BMP:    Lab Results   Component Value Date     03/30/2022    K 3.6 03/30/2022     03/30/2022    CO2 27 03/30/2022    BUN 16 03/30/2022     CMP:   Lab Results   Component Value Date    AST 16 03/30/2022    PROT 7.1 05/06/2021    BILITOT 0.3 03/30/2022    ALKPHOS 73 03/30/2022     TSH:    Lab Results   Component Value Date/Time    TSH 2.730 07/15/2020 12:00 AM       EKGINTERPRETATION - EKG Interpretation:  sinus rhythm      IMPRESSION / RECOMMENDATIONS:     Palpitations  Shortness of breath with exertion  Essential tremors  Non obstructive CAD - per patient  COVID in December 2020    Reports palpitations and shortness of breath with exertion. Patient reports at times she has no energy and sometimes she feels. Patient had an event monitor which did not show any significant arrhythmia on the monitor and she had an average heart rate of 77. Her PVC burden was less than 1%. She had PACs and very short atrial runs. We can start her on propranolol 10 mg 3 times daily she has essential tremors and also she has softer blood pressures so she is unable to tolerate any higher doses of beta-blockers too. I did not see any significant bradycardia to assess for pacemaker on her. We can consider a GXT to see what happens with exertion if she has any arrhythmia induction or if she has any chronotropic incompetence and there may be a reason to consider but at this time I do not see an indication for pacemaker. Patient is on Plavix as per the patient she had a cath in 2014 and she was told that Plavix would be better option for her because for nonobstructive coronary artery disease. I do not have a cath report    She had COVID in 2020 December and since then she has been having all the symptoms of weakness and tiredness and palpitations prior to that she did not have any.   She had an echocardiogram in 2021 in January and LVEF was normal at that time. 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 Please call me if you have any questions. With best regards. Johana Santiago MD, 7/8/2022 11:26 AM     Please note this report has been partially produced using speech recognition software and may contain errors related to that system including errors in grammar, punctuation, and spelling, as well as words and phrases that may be inappropriate. If there are any questions or concerns please feel free to contact the dictating provider for clarification. Simple Simulation Afterword Text Will Be Included With Simple Simulations (Indications............): The patient had a complete consultation regarding all applicable modalities for the treatment of their skin cancer and based on a variety of factors including the type of tumor, size, and location, the relevant medical history as well as local tissue factors, the functional status of the individual, the ability to perform necessary postoperative wound instructions and the need for simultaneous treatments as well as overall wound healing status, it was determined that the patient would begin radiation therapy treatment for skin cancer.  A full simulation and treatment device was designed including lead shield of 0.762 mm thickness to form molded  shielding to specifically correlate with the lesion size including treatment margin.  The lead shield is adequate to accommodate the appropriate applicator and provide adequate shielding around the treatment site.  The specific field applicator, shields and devices as well as the specific patient setup is outlined below.  The patient was given a full consent for superficial radiation to both verbally and in writing and the full determination of patient's eligibility for treatment and selection is outlined on the patient eligibility and treatment selection form.  The specific superficial radiotherapy prescription was determined and was documented on the superficial radiotherapy prescription form.  A treatment calculation was also performed and documented on the treatment calculation form.  Based on the prescription, the patient was scheduled for a series of fractional treatments.

## 2022-12-02 NOTE — ASSESSMENT & PLAN NOTE
Clinically stable on current medications. Continue aggressive risk modification for primary prevention.
Improved significantly.
Last LDL was 84.   Continue current medications
27.9

## 2022-12-07 LAB
ALBUMIN SERPL-MCNC: 4 G/DL
ALP BLD-CCNC: 78 U/L
ALT SERPL-CCNC: 10 U/L
ANION GAP SERPL CALCULATED.3IONS-SCNC: NORMAL MMOL/L
AST SERPL-CCNC: 17 U/L
BASOPHILS ABSOLUTE: 0 /ΜL
BASOPHILS RELATIVE PERCENT: 0.6 %
BILIRUB SERPL-MCNC: 0.5 MG/DL (ref 0.1–1.4)
BUN BLDV-MCNC: 16 MG/DL
CALCIUM SERPL-MCNC: 9.1 MG/DL
CHLORIDE BLD-SCNC: 104 MMOL/L
CHOLESTEROL, TOTAL: 248 MG/DL
CHOLESTEROL/HDL RATIO: ABNORMAL
CO2: 28 MMOL/L
CREAT SERPL-MCNC: 0.6 MG/DL
EGFR: NORMAL
EOSINOPHILS ABSOLUTE: 0.4 /ΜL
EOSINOPHILS RELATIVE PERCENT: 5.5 %
GLUCOSE BLD-MCNC: 99 MG/DL
HCT VFR BLD CALC: 40.4 % (ref 36–46)
HDLC SERPL-MCNC: 101 MG/DL (ref 35–70)
HEMOGLOBIN: 13.9 G/DL (ref 12–16)
LDL CHOLESTEROL CALCULATED: 125 MG/DL (ref 0–160)
LYMPHOCYTES ABSOLUTE: 2 /ΜL
LYMPHOCYTES RELATIVE PERCENT: 30.3 %
MAGNESIUM: 2.1 MG/DL
MCH RBC QN AUTO: 30.8 PG
MCHC RBC AUTO-ENTMCNC: 34.4 G/DL
MCV RBC AUTO: 89.4 FL
MONOCYTES ABSOLUTE: 0.5 /ΜL
MONOCYTES RELATIVE PERCENT: 7.3 %
NEUTROPHILS ABSOLUTE: 3.7 /ΜL
NEUTROPHILS RELATIVE PERCENT: 56 %
NONHDLC SERPL-MCNC: ABNORMAL MG/DL
PLATELET # BLD: 237 K/ΜL
PMV BLD AUTO: 10 FL
POTASSIUM SERPL-SCNC: 4.4 MMOL/L
RBC # BLD: 4.52 10^6/ΜL
SODIUM BLD-SCNC: 141 MMOL/L
TOTAL PROTEIN: 6.5
TRIGL SERPL-MCNC: 111 MG/DL
TSH SERPL DL<=0.05 MIU/L-ACNC: 2.57 UIU/ML
VLDLC SERPL CALC-MCNC: 22 MG/DL
WBC # BLD: 6.6 10^3/ML

## 2023-02-15 ENCOUNTER — OFFICE VISIT (OUTPATIENT)
Dept: CARDIOLOGY CLINIC | Age: 88
End: 2023-02-15
Payer: MEDICARE

## 2023-02-15 VITALS
DIASTOLIC BLOOD PRESSURE: 68 MMHG | HEART RATE: 69 BPM | WEIGHT: 170 LBS | BODY MASS INDEX: 29.02 KG/M2 | HEIGHT: 64 IN | SYSTOLIC BLOOD PRESSURE: 118 MMHG

## 2023-02-15 DIAGNOSIS — E78.00 PURE HYPERCHOLESTEROLEMIA: ICD-10-CM

## 2023-02-15 DIAGNOSIS — I20.8 OTHER FORMS OF ANGINA PECTORIS (HCC): ICD-10-CM

## 2023-02-15 DIAGNOSIS — R25.1 TREMORS OF NERVOUS SYSTEM: ICD-10-CM

## 2023-02-15 DIAGNOSIS — I25.10 CORONARY ARTERY DISEASE INVOLVING NATIVE CORONARY ARTERY OF NATIVE HEART WITHOUT ANGINA PECTORIS: ICD-10-CM

## 2023-02-15 DIAGNOSIS — R00.2 PALPITATIONS: Primary | ICD-10-CM

## 2023-02-15 PROCEDURE — 93000 ELECTROCARDIOGRAM COMPLETE: CPT | Performed by: INTERNAL MEDICINE

## 2023-02-15 PROCEDURE — G8427 DOCREV CUR MEDS BY ELIG CLIN: HCPCS | Performed by: INTERNAL MEDICINE

## 2023-02-15 PROCEDURE — 1090F PRES/ABSN URINE INCON ASSESS: CPT | Performed by: INTERNAL MEDICINE

## 2023-02-15 PROCEDURE — G8484 FLU IMMUNIZE NO ADMIN: HCPCS | Performed by: INTERNAL MEDICINE

## 2023-02-15 PROCEDURE — 1036F TOBACCO NON-USER: CPT | Performed by: INTERNAL MEDICINE

## 2023-02-15 PROCEDURE — G8417 CALC BMI ABV UP PARAM F/U: HCPCS | Performed by: INTERNAL MEDICINE

## 2023-02-15 PROCEDURE — 99214 OFFICE O/P EST MOD 30 MIN: CPT | Performed by: INTERNAL MEDICINE

## 2023-02-15 PROCEDURE — 1123F ACP DISCUSS/DSCN MKR DOCD: CPT | Performed by: INTERNAL MEDICINE

## 2023-02-15 RX ORDER — ZINC GLUCONATE 50 MG
50 TABLET ORAL DAILY
COMMUNITY

## 2023-02-15 RX ORDER — NITROGLYCERIN 0.3 MG/1
0.3 TABLET SUBLINGUAL EVERY 5 MIN PRN
Qty: 25 TABLET | Refills: 1 | Status: SHIPPED | OUTPATIENT
Start: 2023-02-15

## 2023-02-15 RX ORDER — CLOPIDOGREL BISULFATE 75 MG/1
75 TABLET ORAL DAILY
Qty: 90 TABLET | Refills: 3 | Status: SHIPPED | OUTPATIENT
Start: 2023-02-15 | End: 2024-02-15

## 2023-02-15 NOTE — ASSESSMENT & PLAN NOTE
Patient has atypical chest pains doubt cardiac in etiology we will continue risk factor modification and follow-up. She had a stress test last year which was negative for any significant abnormalities. Counseled to take as needed nitroglycerin for possible coronary spasm or esophageal spasm particularly if the symptoms last longer than 5 minutes.

## 2023-02-15 NOTE — PATIENT INSTRUCTIONS
Please be informed that if you contact our office outside of normal business hours the physician on call cannot help with any scheduling or rescheduling issues, procedure instruction questions or any type of medication issue. We advise you for any urgent/emergency that you go to the nearest emergency room! PLEASE CALL OUR OFFICE DURING NORMAL BUSINESS HOURS    Monday - Friday   8 am to 5 pm    [facility] [address]: [phone]    [place]:  [phone]    **It is YOUR responsibilty to bring medication bottles and/or updated medication list to [address] [place] [address]. This will allow us to better serve you and all your healthcare needs**    [facility] Locations - No appointment necessary. Sites open Monday to Friday. Call your preferred location for test preparation, business   hours and other information you need. [email or web address]. [address] Luba Santiago, [address] [facility]  Phone: [phone] Jer[place]n valles  [address] [facility]  [address] [place] v[place]es, [address] [facility]  Phone: [phone]     Thank you for allowing us to care for you today! We want to ensure we can follow your treatment plan and we strive to give you the best outcomes and experience possible. If you ever have a life threatening emergency and call 911 - for an ambulance (EMS)   Our providers can only care for you at:   [facility] or [facility] ED. Even if you have someone take you or you drive yourself we can only care for you in a [place] facility. Our providers are not setup at the other healthcare locations! Patient is instructed with regard to the usage of sublingual nitroglycerin on as needed basis for symptoms of angina or angina equivalent. Patient to sit down and rest if symptoms occur with activity and then take one NTG under the tongue and wait for symptoms to resolve. If no relief in 5 minutes one can repeat the dose.  If not better within 5 minutes one can take third dose and call 911 if symptoms are not improved or resolved for further care. Headache and drop in blood pressure are common side effects. One may not to take second or third dose if dizzy or light headed due to lower blood pressure and call 911 immediately. Continue other current cardiovascular medications which have been reviewed and discussed individually with you. Appropriate prescriptions if needed on this visit are addressed. After visit summery is provided. Questions answered and patient verbalizes understanding. Follow up in 6 months,  sooner if needed.   Please bring all medication bottles and most recent labs to each office visit

## 2023-02-15 NOTE — PROGRESS NOTES
Ledy Killian  1935  Petty Rizvi MD      Chief Complaint   Patient presents with    Chest Pain     Pt has hx of CP and palpitations. CP; started this past Sunday and Monday. Pt was having \"spark like\" dull ache in her chest. Pt took tylenol and it helped with the pain. CP started up again the next morning (Monday), exactly the same as the episode Sunday. CP was accompanied with palpitations and \"skips\" Pt states she could feel it in her neck. Started and stopped when CP did. Pt denies any other cardiac sx. No upcoming surgeries or procedures. No exercise. Refills needed. Chief complaint and HPI:  Ledy Killian  is a 81 y.o. female following up for palpitations and sharp chest pains not with any activities and has had both lipidemia and benign essential tremors. Palpitations have improved since she has been on propanolol which she is taking 10 mg twice a day. She stays active for age but does not exercise regularly. Had labs done by PCP and she was told they were all okay. Rest of the Cardiovascular system review is otherwise unchanged from prior encounter. Past medical history:  has a past medical history of Anxiety, Coronary artery disease involving native coronary artery of native heart without angina pectoris, Essential tremor, H/O cardiac catheterization, H/O cardiovascular stress test, H/O echocardiogram, H/O exercise stress test, Heart murmur, Heart palpitations, Hyperlipidemia, MVP (mitral valve prolapse), Obesity, and Tachycardia. Past surgical history:  has a past surgical history that includes Cholecystectomy; Tonsillectomy; Dilation and curettage of uterus; Vaginal prolapse repair (11/04/2019); Cataract removal (Right, 2/11/2022); and Cataract removal (Left, 3/8/2022).   Social History:   Social History     Tobacco Use    Smoking status: Never    Smokeless tobacco: Never   Substance Use Topics    Alcohol use: No     Comment: Caffeine: medium amount of chocolate daily     Family history: family history includes Cancer in her father and mother; Heart Attack in her father. ALLERGIES:  Lescol, Lipitor, Pcn [penicillins], and Zocor [simvastatin]  Prior to Admission medications    Medication Sig Start Date End Date Taking? Authorizing Provider   zinc gluconate 50 MG tablet Take 50 mg by mouth daily   Yes Historical Provider, MD   nitroGLYCERIN (NITROSTAT) 0.3 MG SL tablet Place 1 tablet under the tongue every 5 minutes as needed for Chest pain (1-3 doses a day) 2/15/23  Yes Minerva Chavez MD   clopidogrel (PLAVIX) 75 MG tablet Take 1 tablet by mouth daily 2/15/23 2/15/24 Yes Minerva Chavez MD   propranolol (INDERAL) 10 MG tablet Take 1 tablet by mouth 3 times daily 10/3/22  Yes Minerva Chavez MD   Cyanocobalamin (VITAMIN B 12) 500 MCG TABS Take 1,000 mg by mouth daily   Yes Historical Provider, MD   Omega-3 Fatty Acids (OMEGA-3 FISH OIL PO) Take by mouth daily   Yes Historical Provider, MD   Multiple Vitamins-Minerals (PRESERVISION AREDS) CAPS Take 1 capsule by mouth 2 times daily   Yes Historical Provider, MD   celecoxib (CELEBREX) 200 MG capsule Take 200 mg by mouth daily Pt taking every other day   Yes Historical Provider, MD   Cholecalciferol (VITAMIN D-3 PO) Take by mouth daily    Yes Historical Provider, MD     Vitals:    02/15/23 1507   BP: 118/68   Site: Left Upper Arm   Position: Sitting   Cuff Size: Medium Adult   Pulse: 69   Weight: 170 lb (77.1 kg)   Height: 5' 4\" (1.626 m)      Body mass index is 29.18 kg/m². Wt Readings from Last 3 Encounters:   02/15/23 170 lb (77.1 kg)   09/09/22 161 lb (73 kg)   07/12/22 158 lb (71.7 kg)     Constitutional:  Patient is normally built and nourished female in no apparent distress. Eyes: She is wearing glasses no conjunctival pallor noted. NECK: No JVP or thyromegaly  Cardiovascular: Auscultation: Normal S1 and S2. No significant murmurs or gallops noted.    Respiratory:  Respiratory effort is normal. Breath sounds are to auscultation. Extremities:  No edema, clubbing,  Cyanosis, petechiae. SKIN: Warm and well perfused, no pallor or cyanosis  Neurologic:  Oriented to time, place, and person and non-anxious. No focal neurological deficit noted. Bilateral tremors noted in hands worse with activity. Psychiatric: Normal mood and effect. EKG today is consistent with normal sinus rhythm 69 bpm with nonspecific T wave abnormalities in precordial leads unchanged compared to last EKG from 2022.    2 weeks of cardiac monitoring done from February 23 to March 7 2022 to evaluate fatigue and palpitations. Rhythm is predominantly normal sinus average rate of 77 bpm.  Minimum rate of 47 bpm occurred at 12:24 AM during sleep. Maximum rate was around 100 bpm.  Rare isolated 127 PVCs are noted without complex ventricular arrhythmias. Frequent PACs and short run of 3-5 beat atrial tachyarrhythmias noted without symptoms. Patient reported multiple episodes of skipped beats and palpitation during normal rate and rhythm. There are no long pauses or any evidence of atrial fibrillation or other complex arrhythmias. LAB REVIEW:  CBC:   Lab Results   Component Value Date/Time    WBC 7.4 03/30/2022 12:00 AM    HGB 14 03/30/2022 12:00 AM    HCT 41.7 03/30/2022 12:00 AM     03/30/2022 12:00 AM     Lipids:   Lab Results   Component Value Date    CHOL 253 03/30/2022    TRIG 128 03/30/2022    HDL 95 (A) 03/30/2022    LDLCALC 132 03/30/2022     Renal:   Lab Results   Component Value Date/Time    BUN 16 03/30/2022 12:00 AM    CREATININE 0.8 03/30/2022 12:00 AM     03/30/2022 12:00 AM    K 3.6 03/30/2022 12:00 AM     PT/INR:   Lab Results   Component Value Date/Time    INR 1.10 12/05/2020 06:40 PM     IMPRESSION and RECOMMENDATIONS:      1. Palpitations  Assessment & Plan:  Have improved on propanolol 10 mg twice daily continue the same. Orders:  -     EKG 12 lead  2.  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 She had a stress test last year which was negative for any significant abnormalities. Counseled to take as needed nitroglycerin for possible coronary spasm or esophageal spasm particularly if the symptoms last longer than 5 minutes. 3. Pure hypercholesterolemia  Assessment & Plan: We will obtain most recent lipid results from PCP. She is on Crestor 5 mg daily. 4. 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 Symptomatic therapy without further objective evaluation is recommended. She verbalized understanding. 5. Tremors of nervous system  Assessment & Plan:  Taking propanolol 10 twice daily. She states  tremors are stable. She was tried on Topamax which she did not tolerate and stopped it. Patient is instructed with regard to the usage of sublingual nitroglycerin on as needed basis for symptoms of angina or angina equivalent. Patient to sit down and rest if symptoms occur with activity and then take one NTG under the tongue and wait for symptoms to resolve. If no relief in 5 minutes one can repeat the dose. If not better within 5 minutes one can take third dose and call 911 if symptoms are not improved or resolved for further care. Headache and drop in blood pressure are common side effects. One may not to take second or third dose if dizzy or light headed due to lower blood pressure and call 911 immediately. Continue other current cardiovascular medications which have been reviewed and discussed individually with you. Appropriate prescriptions if needed on this visit are addressed. After visit summery is provided. Questions answered and patient verbalizes understanding. Follow up in 6 months,  sooner if needed.   Please bring all medication bottles and most recent labs to each office visit    Roxi Parekh MD, 2/15/2023 3:42 PM     Please note this report has been partially produced using speech recognition software and may contain errors related to that system including errors in grammar, punctuation, and spelling, as well as words and phrases that may be inappropriate. If there are any questions or concerns please feel free to contact the dictating provider for clarification.

## 2023-02-15 NOTE — ASSESSMENT & PLAN NOTE
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 Symptomatic therapy without further objective evaluation is recommended. She verbalized understanding.

## 2023-02-15 NOTE — ASSESSMENT & PLAN NOTE
Taking propanolol 10 twice daily. She states  tremors are stable. She was tried on Topamax which she did not tolerate and stopped it.

## 2023-05-04 ENCOUNTER — OFFICE VISIT (OUTPATIENT)
Dept: FAMILY MEDICINE CLINIC | Age: 88
End: 2023-05-04
Payer: MEDICARE

## 2023-05-04 VITALS
SYSTOLIC BLOOD PRESSURE: 108 MMHG | OXYGEN SATURATION: 96 % | RESPIRATION RATE: 16 BRPM | DIASTOLIC BLOOD PRESSURE: 60 MMHG | WEIGHT: 175.8 LBS | HEART RATE: 73 BPM | BODY MASS INDEX: 30.18 KG/M2

## 2023-05-04 DIAGNOSIS — Z86.79 HISTORY OF ANGINA: ICD-10-CM

## 2023-05-04 DIAGNOSIS — Z76.89 ENCOUNTER TO ESTABLISH CARE: Primary | ICD-10-CM

## 2023-05-04 DIAGNOSIS — I25.10 CORONARY ARTERY DISEASE INVOLVING NATIVE CORONARY ARTERY OF NATIVE HEART WITHOUT ANGINA PECTORIS: ICD-10-CM

## 2023-05-04 DIAGNOSIS — M15.9 OSTEOARTHRITIS OF MULTIPLE JOINTS, UNSPECIFIED OSTEOARTHRITIS TYPE: ICD-10-CM

## 2023-05-04 DIAGNOSIS — R25.1 TREMORS OF NERVOUS SYSTEM: ICD-10-CM

## 2023-05-04 PROBLEM — R00.0 TACHYCARDIA: Status: RESOLVED | Noted: 2022-07-12 | Resolved: 2023-05-04

## 2023-05-04 PROBLEM — N81.4 UTERINE PROLAPSE: Status: RESOLVED | Noted: 2019-11-04 | Resolved: 2023-05-04

## 2023-05-04 PROBLEM — H25.11 AGE-RELATED NUCLEAR CATARACT OF RIGHT EYE: Status: RESOLVED | Noted: 2022-02-11 | Resolved: 2023-05-04

## 2023-05-04 PROBLEM — N81.4 UTERINE PROLAPSE: Status: ACTIVE | Noted: 2019-11-04

## 2023-05-04 PROBLEM — H25.12 AGE-RELATED NUCLEAR CATARACT OF LEFT EYE: Status: RESOLVED | Noted: 2022-03-08 | Resolved: 2023-05-04

## 2023-05-04 PROCEDURE — 1036F TOBACCO NON-USER: CPT | Performed by: NURSE PRACTITIONER

## 2023-05-04 PROCEDURE — G8427 DOCREV CUR MEDS BY ELIG CLIN: HCPCS | Performed by: NURSE PRACTITIONER

## 2023-05-04 PROCEDURE — G8417 CALC BMI ABV UP PARAM F/U: HCPCS | Performed by: NURSE PRACTITIONER

## 2023-05-04 PROCEDURE — 1123F ACP DISCUSS/DSCN MKR DOCD: CPT | Performed by: NURSE PRACTITIONER

## 2023-05-04 PROCEDURE — 1090F PRES/ABSN URINE INCON ASSESS: CPT | Performed by: NURSE PRACTITIONER

## 2023-05-04 PROCEDURE — 99203 OFFICE O/P NEW LOW 30 MIN: CPT | Performed by: NURSE PRACTITIONER

## 2023-05-04 SDOH — ECONOMIC STABILITY: FOOD INSECURITY: WITHIN THE PAST 12 MONTHS, YOU WORRIED THAT YOUR FOOD WOULD RUN OUT BEFORE YOU GOT MONEY TO BUY MORE.: NEVER TRUE

## 2023-05-04 SDOH — ECONOMIC STABILITY: HOUSING INSECURITY
IN THE LAST 12 MONTHS, WAS THERE A TIME WHEN YOU DID NOT HAVE A STEADY PLACE TO SLEEP OR SLEPT IN A SHELTER (INCLUDING NOW)?: NO

## 2023-05-04 SDOH — ECONOMIC STABILITY: FOOD INSECURITY: WITHIN THE PAST 12 MONTHS, THE FOOD YOU BOUGHT JUST DIDN'T LAST AND YOU DIDN'T HAVE MONEY TO GET MORE.: NEVER TRUE

## 2023-05-04 SDOH — ECONOMIC STABILITY: INCOME INSECURITY: HOW HARD IS IT FOR YOU TO PAY FOR THE VERY BASICS LIKE FOOD, HOUSING, MEDICAL CARE, AND HEATING?: NOT HARD AT ALL

## 2023-05-04 ASSESSMENT — ENCOUNTER SYMPTOMS
NAUSEA: 0
SHORTNESS OF BREATH: 0
COUGH: 0
SORE THROAT: 0
CONSTIPATION: 0
ABDOMINAL PAIN: 0
VOMITING: 0
STRIDOR: 0
DIARRHEA: 0
WHEEZING: 0
CHEST TIGHTNESS: 0

## 2023-05-04 ASSESSMENT — PATIENT HEALTH QUESTIONNAIRE - PHQ9
SUM OF ALL RESPONSES TO PHQ9 QUESTIONS 1 & 2: 0
SUM OF ALL RESPONSES TO PHQ QUESTIONS 1-9: 0
SUM OF ALL RESPONSES TO PHQ QUESTIONS 1-9: 0
2. FEELING DOWN, DEPRESSED OR HOPELESS: 0
SUM OF ALL RESPONSES TO PHQ QUESTIONS 1-9: 0
SUM OF ALL RESPONSES TO PHQ QUESTIONS 1-9: 0
1. LITTLE INTEREST OR PLEASURE IN DOING THINGS: 0

## 2023-05-15 PROBLEM — M19.90 OSTEOARTHRITIS (ARTHRITIS DUE TO WEAR AND TEAR OF JOINTS): Status: ACTIVE | Noted: 2023-05-15

## 2023-07-17 ENCOUNTER — OFFICE VISIT (OUTPATIENT)
Dept: FAMILY MEDICINE CLINIC | Age: 88
End: 2023-07-17
Payer: MEDICARE

## 2023-07-17 VITALS
TEMPERATURE: 98.1 F | SYSTOLIC BLOOD PRESSURE: 108 MMHG | HEIGHT: 64 IN | WEIGHT: 175.2 LBS | BODY MASS INDEX: 29.91 KG/M2 | RESPIRATION RATE: 16 BRPM | OXYGEN SATURATION: 94 % | HEART RATE: 63 BPM | DIASTOLIC BLOOD PRESSURE: 66 MMHG

## 2023-07-17 DIAGNOSIS — R06.09 DYSPNEA ON EXERTION: Primary | ICD-10-CM

## 2023-07-17 DIAGNOSIS — R12 HEARTBURN: ICD-10-CM

## 2023-07-17 DIAGNOSIS — Z12.31 ENCOUNTER FOR SCREENING MAMMOGRAM FOR BREAST CANCER: ICD-10-CM

## 2023-07-17 DIAGNOSIS — L98.9 ENLARGING SKIN LESION: ICD-10-CM

## 2023-07-17 PROCEDURE — G8427 DOCREV CUR MEDS BY ELIG CLIN: HCPCS | Performed by: NURSE PRACTITIONER

## 2023-07-17 PROCEDURE — 99214 OFFICE O/P EST MOD 30 MIN: CPT | Performed by: NURSE PRACTITIONER

## 2023-07-17 PROCEDURE — 1090F PRES/ABSN URINE INCON ASSESS: CPT | Performed by: NURSE PRACTITIONER

## 2023-07-17 PROCEDURE — 1036F TOBACCO NON-USER: CPT | Performed by: NURSE PRACTITIONER

## 2023-07-17 PROCEDURE — 1123F ACP DISCUSS/DSCN MKR DOCD: CPT | Performed by: NURSE PRACTITIONER

## 2023-07-17 PROCEDURE — G8417 CALC BMI ABV UP PARAM F/U: HCPCS | Performed by: NURSE PRACTITIONER

## 2023-07-17 ASSESSMENT — ENCOUNTER SYMPTOMS
NAUSEA: 0
WHEEZING: 0
COUGH: 0
ABDOMINAL PAIN: 0
CHEST TIGHTNESS: 0
SHORTNESS OF BREATH: 1
CONSTIPATION: 0
SORE THROAT: 0
VOMITING: 0
DIARRHEA: 0
STRIDOR: 0

## 2023-07-17 NOTE — PROGRESS NOTES
(mitral valve prolapse)     Obesity     Palpitations     PVCs and history of mitral valve prolapse    Tachycardia     Uterine prolapse 11/4/2019    Last Assessment & Plan:  Formatting of this note might be different from the original. Aide Hughes is a 81 y.o. female with PMHx HLD, hx TIA, tremor, IBS, MVP, OA, low back pain, hip pain who is POD#1  from [facility], vaginectomy, [place] colpocleisis, vulvar biopsy, cystoscopy, anal sphinteroplasty, posterior repair, perineoplasty (Dr. Erika Evans). - HD stable - Pain: well-controlled  - Attempt to limit n        Social History     Tobacco Use    Smoking status: Never    Smokeless tobacco: Never   Substance Use Topics    Alcohol use: No     Comment: Caffeine: medium amount of chocolate daily        Review of Systems   Constitutional:  Negative for activity change, appetite change, chills, fatigue, fever and unexpected weight change. HENT:  Negative for congestion, ear pain, hearing loss, sore throat and tinnitus. Eyes:  Negative for visual disturbance. Respiratory:  Positive for shortness of breath. Negative for apnea, cough, choking, chest tightness, wheezing and stridor. Cardiovascular:  Negative for chest pain (hx of angina), palpitations and leg swelling. Gastrointestinal:  Negative for abdominal pain, constipation, diarrhea, nausea and vomiting. Heartburn in the evening   Musculoskeletal:  Positive for arthralgias (hx of osteoarthritis in multiple joints; pain is worse in feet). Negative for gait problem. Skin: Negative. Neurological:  Positive for tremors. Negative for dizziness, seizures, syncope, speech difficulty, weakness and headaches. Hematological:  Negative for adenopathy. Psychiatric/Behavioral:  Negative for behavioral problems, confusion, sleep disturbance and suicidal ideas. The patient is not nervous/anxious.           Objective:      /66 (Site: Left Upper Arm, Position: Sitting, Cuff Size: Large Adult)   Pulse 63   Temp 98.1

## 2023-07-20 RX ORDER — FAMOTIDINE 20 MG/1
20 TABLET, FILM COATED ORAL
Qty: 90 TABLET | Refills: 3 | Status: SHIPPED | OUTPATIENT
Start: 2023-07-20 | End: 2024-07-14

## 2023-07-20 ASSESSMENT — ENCOUNTER SYMPTOMS
CHOKING: 0
APNEA: 0

## 2023-08-11 ENCOUNTER — HOSPITAL ENCOUNTER (OUTPATIENT)
Dept: CT IMAGING | Age: 88
Discharge: HOME | End: 2023-08-11
Payer: MEDICARE

## 2023-08-11 ENCOUNTER — HOSPITAL ENCOUNTER (OUTPATIENT)
Dept: WOMENS IMAGING | Age: 88
Discharge: HOME | End: 2023-08-11
Payer: MEDICARE

## 2023-08-11 DIAGNOSIS — Z12.31 ENCOUNTER FOR SCREENING MAMMOGRAM FOR BREAST CANCER: ICD-10-CM

## 2023-08-11 DIAGNOSIS — R06.09 DYSPNEA ON EXERTION: ICD-10-CM

## 2023-08-11 PROCEDURE — 71250 CT THORAX DX C-: CPT

## 2023-08-11 PROCEDURE — 77063 BREAST TOMOSYNTHESIS BI: CPT

## 2023-08-17 DIAGNOSIS — R93.89 ABNORMAL CT OF THE CHEST: ICD-10-CM

## 2023-08-18 ENCOUNTER — OFFICE VISIT (OUTPATIENT)
Dept: CARDIOLOGY CLINIC | Age: 88
End: 2023-08-18
Payer: MEDICARE

## 2023-08-18 VITALS
BODY MASS INDEX: 28.99 KG/M2 | HEART RATE: 76 BPM | WEIGHT: 174 LBS | SYSTOLIC BLOOD PRESSURE: 118 MMHG | DIASTOLIC BLOOD PRESSURE: 72 MMHG | HEIGHT: 65 IN

## 2023-08-18 DIAGNOSIS — I25.10 CORONARY ARTERY DISEASE INVOLVING NATIVE CORONARY ARTERY OF NATIVE HEART WITHOUT ANGINA PECTORIS: ICD-10-CM

## 2023-08-18 DIAGNOSIS — R25.1 TREMORS OF NERVOUS SYSTEM: ICD-10-CM

## 2023-08-18 DIAGNOSIS — R00.2 PALPITATIONS: Primary | ICD-10-CM

## 2023-08-18 DIAGNOSIS — E78.00 PURE HYPERCHOLESTEROLEMIA: ICD-10-CM

## 2023-08-18 DIAGNOSIS — Z78.9 STATIN INTOLERANCE: ICD-10-CM

## 2023-08-18 PROCEDURE — 99214 OFFICE O/P EST MOD 30 MIN: CPT | Performed by: NURSE PRACTITIONER

## 2023-08-18 PROCEDURE — G8417 CALC BMI ABV UP PARAM F/U: HCPCS | Performed by: NURSE PRACTITIONER

## 2023-08-18 PROCEDURE — 1090F PRES/ABSN URINE INCON ASSESS: CPT | Performed by: NURSE PRACTITIONER

## 2023-08-18 PROCEDURE — 1123F ACP DISCUSS/DSCN MKR DOCD: CPT | Performed by: NURSE PRACTITIONER

## 2023-08-18 PROCEDURE — G8427 DOCREV CUR MEDS BY ELIG CLIN: HCPCS | Performed by: NURSE PRACTITIONER

## 2023-08-18 PROCEDURE — 1036F TOBACCO NON-USER: CPT | Performed by: NURSE PRACTITIONER

## 2023-08-18 PROCEDURE — 93000 ELECTROCARDIOGRAM COMPLETE: CPT | Performed by: NURSE PRACTITIONER

## 2023-08-18 ASSESSMENT — ENCOUNTER SYMPTOMS
SLEEP DISTURBANCES DUE TO BREATHING: 0
SHORTNESS OF BREATH: 0
ORTHOPNEA: 0

## 2023-08-24 LAB
H CAPSUL AG UR IA-ACNC: NOT DETECTED NG/ML
H CAPSUL AG UR QL IA: NOT DETECTED

## 2023-10-09 ENCOUNTER — OFFICE VISIT (OUTPATIENT)
Dept: FAMILY MEDICINE CLINIC | Age: 88
End: 2023-10-09
Payer: MEDICARE

## 2023-10-09 VITALS
WEIGHT: 171.6 LBS | DIASTOLIC BLOOD PRESSURE: 68 MMHG | SYSTOLIC BLOOD PRESSURE: 110 MMHG | HEIGHT: 65 IN | RESPIRATION RATE: 18 BRPM | BODY MASS INDEX: 28.59 KG/M2 | TEMPERATURE: 98 F | HEART RATE: 75 BPM | OXYGEN SATURATION: 98 %

## 2023-10-09 DIAGNOSIS — U07.1 COVID: Primary | ICD-10-CM

## 2023-10-09 PROCEDURE — 1090F PRES/ABSN URINE INCON ASSESS: CPT | Performed by: NURSE PRACTITIONER

## 2023-10-09 PROCEDURE — 1123F ACP DISCUSS/DSCN MKR DOCD: CPT | Performed by: NURSE PRACTITIONER

## 2023-10-09 PROCEDURE — G8417 CALC BMI ABV UP PARAM F/U: HCPCS | Performed by: NURSE PRACTITIONER

## 2023-10-09 PROCEDURE — 1036F TOBACCO NON-USER: CPT | Performed by: NURSE PRACTITIONER

## 2023-10-09 PROCEDURE — G8427 DOCREV CUR MEDS BY ELIG CLIN: HCPCS | Performed by: NURSE PRACTITIONER

## 2023-10-09 PROCEDURE — 99213 OFFICE O/P EST LOW 20 MIN: CPT | Performed by: NURSE PRACTITIONER

## 2023-10-09 PROCEDURE — G8484 FLU IMMUNIZE NO ADMIN: HCPCS | Performed by: NURSE PRACTITIONER

## 2023-10-09 NOTE — PROGRESS NOTES
Subjective:      Chief Complaint   Patient presents with    Post-COVID Symptoms     Tested positive for Covid on 10/3/2023, started with symptoms on 9/30/2023, still having cough, a lot of congestion and feels weak       HPI:  Theresa Da Silva is a 81 y.o. female who presents today for continued fatigue, nasal congestion, productive cough with clear sputum and generalized weakness. She took a home test and tested positive for COVID on 10/03/2023. Her symptoms started 09/30/2023. She denies fever, chills, sore throat, shortness of breath, nasal congestion, sputum production, diarrhea, nausea and vomiting, headache, muscle pain, abdominal pain, loss of smell, and loss of taste. She has not been taking any OTC medications to manage symptoms. Past Medical History:   Diagnosis Date    Age-related nuclear cataract of left eye 3/8/2022    Age-related nuclear cataract of right eye 2/11/2022    Anxiety     Coronary artery disease involving native coronary artery of native heart without angina pectoris 03/20/2018    Ectatic vessel with sluggish blood flow 2004 cath    Essential tremor     H/O cardiac catheterization 1993, 2004    Ectasia of cornaries, normal EF    H/O cardiovascular stress test 04/29/2014    Stress Cardiolite. Normal perfusion in the distribution of all coronaries, normal LV size and function, EF 70%. H/O echocardiogram 01/14/2021    EF 55-60%. E/A reversal suggests abnormal LV relaxation.  Mild MR.    H/O exercise stress test 05/1993    Heart murmur     Heart palpitations     Hyperlipidemia     MVP (mitral valve prolapse)     Obesity     Palpitations     PVCs and history of mitral valve prolapse    Tachycardia     Uterine prolapse 11/4/2019    Last Assessment & Plan:  Formatting of this note might be different from the original. Theresa Da Silva is a 81 y.o. female with PMHx HLD, hx TIA, tremor, IBS, MVP, OA, low back pain, hip pain who is POD#1  from [facility], vaginectomy, LeFort colpocleisis, vulvar

## 2023-10-12 PROBLEM — U07.1 COVID: Status: ACTIVE | Noted: 2023-10-12

## 2023-10-12 ASSESSMENT — ENCOUNTER SYMPTOMS
VOICE CHANGE: 0
DIARRHEA: 0
CONSTIPATION: 0
COUGH: 1
NAUSEA: 0
CHEST TIGHTNESS: 0
RHINORRHEA: 1
VOMITING: 0
TROUBLE SWALLOWING: 0
ABDOMINAL PAIN: 0
SINUS PRESSURE: 0
SORE THROAT: 0
SHORTNESS OF BREATH: 0
WHEEZING: 0
STRIDOR: 0
SINUS PAIN: 0

## 2023-10-27 ENCOUNTER — OFFICE VISIT (OUTPATIENT)
Dept: FAMILY MEDICINE CLINIC | Age: 88
End: 2023-10-27
Payer: MEDICARE

## 2023-10-27 ENCOUNTER — HOSPITAL ENCOUNTER (OUTPATIENT)
Dept: GENERAL RADIOLOGY | Age: 88
Discharge: HOME | End: 2023-10-27
Payer: MEDICARE

## 2023-10-27 ENCOUNTER — HOSPITAL ENCOUNTER (OUTPATIENT)
Age: 88
Discharge: HOME | End: 2023-10-27
Payer: MEDICARE

## 2023-10-27 VITALS
OXYGEN SATURATION: 96 % | SYSTOLIC BLOOD PRESSURE: 110 MMHG | HEIGHT: 65 IN | TEMPERATURE: 97.3 F | RESPIRATION RATE: 16 BRPM | HEART RATE: 73 BPM | WEIGHT: 172.4 LBS | DIASTOLIC BLOOD PRESSURE: 68 MMHG | BODY MASS INDEX: 28.72 KG/M2

## 2023-10-27 DIAGNOSIS — R05.2 SUBACUTE COUGH: ICD-10-CM

## 2023-10-27 DIAGNOSIS — J22 LOWER RESPIRATORY INFECTION: ICD-10-CM

## 2023-10-27 DIAGNOSIS — R53.1 GENERALIZED WEAKNESS: ICD-10-CM

## 2023-10-27 DIAGNOSIS — R53.1 GENERALIZED WEAKNESS: Primary | ICD-10-CM

## 2023-10-27 PROCEDURE — 1123F ACP DISCUSS/DSCN MKR DOCD: CPT | Performed by: NURSE PRACTITIONER

## 2023-10-27 PROCEDURE — G8417 CALC BMI ABV UP PARAM F/U: HCPCS | Performed by: NURSE PRACTITIONER

## 2023-10-27 PROCEDURE — 71046 X-RAY EXAM CHEST 2 VIEWS: CPT

## 2023-10-27 PROCEDURE — G8484 FLU IMMUNIZE NO ADMIN: HCPCS | Performed by: NURSE PRACTITIONER

## 2023-10-27 PROCEDURE — 1090F PRES/ABSN URINE INCON ASSESS: CPT | Performed by: NURSE PRACTITIONER

## 2023-10-27 PROCEDURE — 36415 COLL VENOUS BLD VENIPUNCTURE: CPT | Performed by: NURSE PRACTITIONER

## 2023-10-27 PROCEDURE — 1036F TOBACCO NON-USER: CPT | Performed by: NURSE PRACTITIONER

## 2023-10-27 PROCEDURE — 93000 ELECTROCARDIOGRAM COMPLETE: CPT | Performed by: NURSE PRACTITIONER

## 2023-10-27 PROCEDURE — 99213 OFFICE O/P EST LOW 20 MIN: CPT | Performed by: NURSE PRACTITIONER

## 2023-10-27 PROCEDURE — G8427 DOCREV CUR MEDS BY ELIG CLIN: HCPCS | Performed by: NURSE PRACTITIONER

## 2023-10-27 RX ORDER — AZITHROMYCIN 250 MG/1
250 TABLET, FILM COATED ORAL SEE ADMIN INSTRUCTIONS
Qty: 6 TABLET | Refills: 0 | Status: SHIPPED | OUTPATIENT
Start: 2023-10-27 | End: 2023-11-01

## 2023-10-27 ASSESSMENT — ENCOUNTER SYMPTOMS
SORE THROAT: 0
TROUBLE SWALLOWING: 0
VOICE CHANGE: 0
WHEEZING: 0
SINUS PAIN: 0
SHORTNESS OF BREATH: 0
COUGH: 1
CHEST TIGHTNESS: 0
CONSTIPATION: 0
RHINORRHEA: 0
STRIDOR: 0
SINUS PRESSURE: 0
ABDOMINAL PAIN: 0
DIARRHEA: 0
VOMITING: 0
NAUSEA: 0

## 2023-10-28 LAB
ALBUMIN SERPL-MCNC: 4.2 G/DL (ref 3.4–5)
ALBUMIN/GLOB SERPL: 1.8 {RATIO} (ref 1.1–2.2)
ALP SERPL-CCNC: 72 U/L (ref 40–129)
ALT SERPL-CCNC: 8 U/L (ref 10–40)
ANION GAP SERPL CALCULATED.3IONS-SCNC: 12 MMOL/L (ref 3–16)
AST SERPL-CCNC: 16 U/L (ref 15–37)
BASOPHILS # BLD: 0 K/UL (ref 0–0.2)
BASOPHILS NFR BLD: 0.5 %
BILIRUB SERPL-MCNC: 0.4 MG/DL (ref 0–1)
BUN SERPL-MCNC: 21 MG/DL (ref 7–20)
CALCIUM SERPL-MCNC: 9.4 MG/DL (ref 8.3–10.6)
CHLORIDE SERPL-SCNC: 101 MMOL/L (ref 99–110)
CK SERPL-CCNC: 320 U/L (ref 26–192)
CO2 SERPL-SCNC: 28 MMOL/L (ref 21–32)
CREAT SERPL-MCNC: 0.7 MG/DL (ref 0.6–1.2)
DEPRECATED RDW RBC AUTO: 13.5 % (ref 12.4–15.4)
EOSINOPHIL # BLD: 0.5 K/UL (ref 0–0.6)
EOSINOPHIL NFR BLD: 4.8 %
GFR SERPLBLD CREATININE-BSD FMLA CKD-EPI: >60 ML/MIN/{1.73_M2}
GLUCOSE SERPL-MCNC: 97 MG/DL (ref 70–99)
HCT VFR BLD AUTO: 39.1 % (ref 36–48)
HGB BLD-MCNC: 13.4 G/DL (ref 12–16)
LYMPHOCYTES # BLD: 2.5 K/UL (ref 1–5.1)
LYMPHOCYTES NFR BLD: 26 %
MCH RBC QN AUTO: 31.6 PG (ref 26–34)
MCHC RBC AUTO-ENTMCNC: 34.2 G/DL (ref 31–36)
MCV RBC AUTO: 92.3 FL (ref 80–100)
MONOCYTES # BLD: 0.8 K/UL (ref 0–1.3)
MONOCYTES NFR BLD: 8.6 %
NEUTROPHILS # BLD: 5.7 K/UL (ref 1.7–7.7)
NEUTROPHILS NFR BLD: 60.1 %
NT-PROBNP SERPL-MCNC: 136 PG/ML (ref 0–449)
PLATELET # BLD AUTO: 219 K/UL (ref 135–450)
PMV BLD AUTO: 8.4 FL (ref 5–10.5)
POTASSIUM SERPL-SCNC: 4.7 MMOL/L (ref 3.5–5.1)
PROT SERPL-MCNC: 6.5 G/DL (ref 6.4–8.2)
RBC # BLD AUTO: 4.24 M/UL (ref 4–5.2)
SODIUM SERPL-SCNC: 141 MMOL/L (ref 136–145)
T4 FREE SERPL-MCNC: 1.3 NG/DL (ref 0.9–1.8)
TSH SERPL DL<=0.005 MIU/L-ACNC: 2.99 UIU/ML (ref 0.27–4.2)
WBC # BLD AUTO: 9.5 K/UL (ref 4–11)

## 2023-11-06 ENCOUNTER — OFFICE VISIT (OUTPATIENT)
Dept: FAMILY MEDICINE CLINIC | Age: 88
End: 2023-11-06
Payer: MEDICARE

## 2023-11-06 ENCOUNTER — HOSPITAL ENCOUNTER (EMERGENCY)
Age: 88
Discharge: HOME | End: 2023-11-06
Attending: EMERGENCY MEDICINE
Payer: MEDICARE

## 2023-11-06 VITALS
BODY MASS INDEX: 28.66 KG/M2 | SYSTOLIC BLOOD PRESSURE: 128 MMHG | HEIGHT: 65 IN | TEMPERATURE: 97.8 F | DIASTOLIC BLOOD PRESSURE: 84 MMHG | RESPIRATION RATE: 18 BRPM | OXYGEN SATURATION: 96 % | WEIGHT: 172 LBS | HEART RATE: 86 BPM

## 2023-11-06 VITALS
HEART RATE: 76 BPM | BODY MASS INDEX: 28.72 KG/M2 | TEMPERATURE: 98.6 F | OXYGEN SATURATION: 94 % | SYSTOLIC BLOOD PRESSURE: 110 MMHG | WEIGHT: 172.6 LBS | RESPIRATION RATE: 16 BRPM | DIASTOLIC BLOOD PRESSURE: 62 MMHG

## 2023-11-06 DIAGNOSIS — Z00.00 INITIAL MEDICARE ANNUAL WELLNESS VISIT: Primary | ICD-10-CM

## 2023-11-06 DIAGNOSIS — R26.81 UNSTEADY GAIT: ICD-10-CM

## 2023-11-06 DIAGNOSIS — L03.211 FACIAL CELLULITIS: Primary | ICD-10-CM

## 2023-11-06 PROBLEM — U07.1 COVID: Status: RESOLVED | Noted: 2023-10-12 | Resolved: 2023-11-06

## 2023-11-06 PROCEDURE — G8484 FLU IMMUNIZE NO ADMIN: HCPCS | Performed by: NURSE PRACTITIONER

## 2023-11-06 PROCEDURE — 99283 EMERGENCY DEPT VISIT LOW MDM: CPT

## 2023-11-06 PROCEDURE — G0438 PPPS, INITIAL VISIT: HCPCS | Performed by: NURSE PRACTITIONER

## 2023-11-06 PROCEDURE — 6370000000 HC RX 637 (ALT 250 FOR IP): Performed by: EMERGENCY MEDICINE

## 2023-11-06 PROCEDURE — 1123F ACP DISCUSS/DSCN MKR DOCD: CPT | Performed by: NURSE PRACTITIONER

## 2023-11-06 RX ORDER — DOXYCYCLINE HYCLATE 100 MG
100 TABLET ORAL ONCE
Status: COMPLETED | OUTPATIENT
Start: 2023-11-06 | End: 2023-11-06

## 2023-11-06 RX ORDER — CLINDAMYCIN HYDROCHLORIDE 300 MG/1
300 CAPSULE ORAL 3 TIMES DAILY
Qty: 21 CAPSULE | Refills: 0 | Status: SHIPPED | OUTPATIENT
Start: 2023-11-06 | End: 2023-11-13

## 2023-11-06 RX ORDER — CELECOXIB 200 MG/1
200 CAPSULE ORAL EVERY OTHER DAY
Qty: 45 CAPSULE | Refills: 3 | Status: SHIPPED | OUTPATIENT
Start: 2023-11-06 | End: 2024-10-31

## 2023-11-06 RX ORDER — CLINDAMYCIN HYDROCHLORIDE 150 MG/1
300 CAPSULE ORAL ONCE
Status: COMPLETED | OUTPATIENT
Start: 2023-11-06 | End: 2023-11-06

## 2023-11-06 RX ORDER — DOXYCYCLINE HYCLATE 100 MG
100 TABLET ORAL 2 TIMES DAILY
Qty: 14 TABLET | Refills: 0 | Status: SHIPPED | OUTPATIENT
Start: 2023-11-06 | End: 2023-11-13

## 2023-11-06 RX ADMIN — DOXYCYCLINE HYCLATE 100 MG: 100 TABLET, COATED ORAL at 05:52

## 2023-11-06 RX ADMIN — CLINDAMYCIN HYDROCHLORIDE 300 MG: 150 CAPSULE ORAL at 05:52

## 2023-11-06 ASSESSMENT — PATIENT HEALTH QUESTIONNAIRE - PHQ9
2. FEELING DOWN, DEPRESSED OR HOPELESS: 0
SUM OF ALL RESPONSES TO PHQ9 QUESTIONS 1 & 2: 1
SUM OF ALL RESPONSES TO PHQ QUESTIONS 1-9: 1
SUM OF ALL RESPONSES TO PHQ QUESTIONS 1-9: 1
1. LITTLE INTEREST OR PLEASURE IN DOING THINGS: 1
SUM OF ALL RESPONSES TO PHQ QUESTIONS 1-9: 1
SUM OF ALL RESPONSES TO PHQ QUESTIONS 1-9: 1

## 2023-11-06 ASSESSMENT — PAIN - FUNCTIONAL ASSESSMENT: PAIN_FUNCTIONAL_ASSESSMENT: NONE - DENIES PAIN

## 2023-11-06 NOTE — ED PROVIDER NOTES
Triage Chief Complaint:    Facial Swelling (C/o increased redness and swelling under eyes and surrounding sore on forehead that she first noticed last week. States she went to the pharmacy and was given Bacitracin-neomycin-polymyxin ointment which she had been putting on this Friday and Saturday. She did not see her Dr because she was going to see him later today (previously scheduled appointment). )    PILAR Meza is a 81 y.o. female that presents for evaluation of facial swelling that is been ongoing for the last few days to almost a week. She did go last week to the pharmacy and they had given her antibiotic ointment. She is been using this. She believes it started with a sore that was on her forehead that opened up. With the redness spreading she wanted to come in to get evaluated. She has not noticed any significant fevers or chills. No change to vision hearing or speech. Has not noticed any trauma that she can recall. She states that she had oral antibiotic last month for possible pneumonia but no other recent antibiotics. History from : Patient    Limitations to history : None    ROS:  10 systems reviewed and negative except as above. Past Medical History:   Diagnosis Date    Age-related nuclear cataract of left eye 3/8/2022    Age-related nuclear cataract of right eye 2/11/2022    Anxiety     Coronary artery disease involving native coronary artery of native heart without angina pectoris 03/20/2018    Ectatic vessel with sluggish blood flow 2004 cath    Essential tremor     H/O cardiac catheterization 1993, 2004    Ectasia of cornaries, normal EF    H/O cardiovascular stress test 04/29/2014    Stress Cardiolite. Normal perfusion in the distribution of all coronaries, normal LV size and function, EF 70%. H/O echocardiogram 01/14/2021    EF 55-60%. E/A reversal suggests abnormal LV relaxation.  Mild MR.    H/O exercise stress test 05/1993    Heart murmur     Heart palpitations

## 2023-11-09 ENCOUNTER — OFFICE VISIT (OUTPATIENT)
Dept: FAMILY MEDICINE CLINIC | Age: 88
End: 2023-11-09
Payer: MEDICARE

## 2023-11-09 VITALS
TEMPERATURE: 97.9 F | DIASTOLIC BLOOD PRESSURE: 62 MMHG | RESPIRATION RATE: 16 BRPM | HEART RATE: 94 BPM | OXYGEN SATURATION: 95 % | SYSTOLIC BLOOD PRESSURE: 110 MMHG

## 2023-11-09 DIAGNOSIS — L03.811 CELLULITIS OF HEAD EXCEPT FACE: Primary | ICD-10-CM

## 2023-11-09 PROCEDURE — 1123F ACP DISCUSS/DSCN MKR DOCD: CPT | Performed by: NURSE PRACTITIONER

## 2023-11-09 PROCEDURE — G8417 CALC BMI ABV UP PARAM F/U: HCPCS | Performed by: NURSE PRACTITIONER

## 2023-11-09 PROCEDURE — 1090F PRES/ABSN URINE INCON ASSESS: CPT | Performed by: NURSE PRACTITIONER

## 2023-11-09 PROCEDURE — G8427 DOCREV CUR MEDS BY ELIG CLIN: HCPCS | Performed by: NURSE PRACTITIONER

## 2023-11-09 PROCEDURE — 1036F TOBACCO NON-USER: CPT | Performed by: NURSE PRACTITIONER

## 2023-11-09 PROCEDURE — G8484 FLU IMMUNIZE NO ADMIN: HCPCS | Performed by: NURSE PRACTITIONER

## 2023-11-09 PROCEDURE — 99213 OFFICE O/P EST LOW 20 MIN: CPT | Performed by: NURSE PRACTITIONER

## 2023-11-09 ASSESSMENT — ENCOUNTER SYMPTOMS
DIARRHEA: 0
ABDOMINAL PAIN: 0
SORE THROAT: 0
VOMITING: 0
NAUSEA: 0
COUGH: 0
COLOR CHANGE: 1
CHEST TIGHTNESS: 0
WHEEZING: 0
SHORTNESS OF BREATH: 0
CONSTIPATION: 0
STRIDOR: 0

## 2023-11-09 NOTE — PROGRESS NOTES
Subjective:      Chief Complaint   Patient presents with    Follow-up     Follow up for cellulitis       HPI:  Herbert Ochoa is a 81 y.o. female who presents today for 3 day follow up. She is taking her antibiotic as prescribed. No SE to medication. She is still tired. The swelling around her eyes has improved. The wound on her forehead is scabbed over. There is minimal redness around the wound. She denies fever, chills. Past Medical History:   Diagnosis Date    Age-related nuclear cataract of left eye 3/8/2022    Age-related nuclear cataract of right eye 2/11/2022    Anxiety     Coronary artery disease involving native coronary artery of native heart without angina pectoris 03/20/2018    Ectatic vessel with sluggish blood flow 2004 cath    Essential tremor     H/O cardiac catheterization 1993, 2004    Ectasia of cornaries, normal EF    H/O cardiovascular stress test 04/29/2014    Stress Cardiolite. Normal perfusion in the distribution of all coronaries, normal LV size and function, EF 70%. H/O echocardiogram 01/14/2021    EF 55-60%. E/A reversal suggests abnormal LV relaxation. Mild MR.    H/O exercise stress test 05/1993    Heart murmur     Heart palpitations     Hyperlipidemia     MVP (mitral valve prolapse)     Obesity     Palpitations     PVCs and history of mitral valve prolapse    Tachycardia     Uterine prolapse 11/4/2019    Last Assessment & Plan:  Formatting of this note might be different from the original. Herbert Ochoa is a 81 y.o. female with PMHx HLD, hx TIA, tremor, IBS, MVP, OA, low back pain, hip pain who is POD#1  from [facility], vaginectomy, LeFort colpocleisis, vulvar biopsy, cystoscopy, anal sphinteroplasty, posterior repair, perineoplasty (Dr. Ashwin Howe).   - HD stable - Pain: well-controlled  - Attempt to limit n        Social History     Tobacco Use    Smoking status: Never    Smokeless tobacco: Never   Substance Use Topics    Alcohol use: No     Comment: Caffeine: medium amount of

## 2023-11-13 ENCOUNTER — HOSPITAL ENCOUNTER (OUTPATIENT)
Dept: PHYSICAL THERAPY | Age: 88
Setting detail: THERAPIES SERIES
Discharge: HOME | End: 2023-11-13
Payer: MEDICARE

## 2023-11-13 PROCEDURE — 97162 PT EVAL MOD COMPLEX 30 MIN: CPT

## 2023-11-13 PROCEDURE — 97112 NEUROMUSCULAR REEDUCATION: CPT

## 2023-11-13 NOTE — PROGRESS NOTES
[facility] Outpatient Physical Therapy  [address] [address]  Phone: [phone]  Fax: [phone]      PHYSICAL THERAPY INITIAL ASSESSMENT      Date: 2023  Patient Name: Barbara Mayo   : 1935  Referred by: HAZEL Villatoro NP  Reason for Referral: R26.81 unsteady gait  PT Impression: M62.81 muscle weakness, R26.89 abnormality of gait  Insurance: Medicare  Restrictions/Precautions: fall risk    Subjective   Chart Reviewed: Yes   Patient assessed for rehabilitation services?: Yes   Family / Caregiver Present: no  Follows Commands: Within Functional Limits   Date of onset:  about 1 year. Subjective: Pt reports she has noticed she has difficulty maintaining a straight path. She reports she really notices it when walking on the sidewalk. Current Situation: Pt reports she ambulates with no AD except when going out to feed her cats in the barn she uses a SPC. Observation: Pt demonstrated a tremor  Medication: updated in EMR    Pain Screening   Patient Currently in Pain:   Pain Assessment: 0-10   Pain Level: 0/10    Sensation: intermittent  pt reports paresthesias in feet and hands at times. Vision/Hearing   Vision: impaired. Pt reports she wears glasses all the time. She reports she has had B cataract surgery, but has a film on L eye. Hearing: Within functional limits w/ hearing aides    Home Living  Lives With: alone  Type of Home: house  Home Layout:  2 story w/ basement. Pt reports she remains on the main level. Steps/Hand rails: 6 steps with HR. Pt reports she ambulates non reciprocal secondary to R knee  Toilet: handicapped height w/bars  Bathroom: walk in shower, w/ built in shower chair, grab bars, [facility]  Equipment: [address] , grabber,   Work: retired  Hobbies: pt spends time at [employer] center, goes out to eat with friends, etc.   Housework: Pt reports she has A to clean, but does all other housework.  She reports she doesn't cook much  Prior level

## 2023-11-14 NOTE — FLOWSHEET NOTE
Outpatient Physical Therapy  [place]           [] Phone: [phone]   Fax: [phone]  [place]           [x] Phone: [phone]   Fax: [phone]        Physical Therapy Daily Treatment Note  Date:  2023    Patient Name:  Nan Bernal    :  1935  MRN: [id number]  Restrictions/Precautions: fall risk  Diagnosis:   Unsteady gait [R26.81]    Date of Injury/Surgery: chronic about 1 year  Treatment Diagnosis:   M62.81 muscle weakness, R26.89 abnormality of gait  Insurance/Certification information:  Medicare  Referring Physician:  HAZEL Schmidt *     PCP: HAZEL Schmidt - NP  Plan of care signed (Y/N):  jadyn co sign faxed. Outcome Measure: LEFs: 40/80 = 50% ability = 50% disability   2 MWT: 308.1 feet  Oconnell Balance Test: 47  Tinetti Gait: 10  Tinetti Balance: 14  Combined: 24  Visit# / total visits:   1/10 then PN  Pain level: 0/10   Goals:     Patient goals:   \"to improve balance and strength\"    Short term goals to be achieved by 2023:  Short term goal 1: Pt will report compliance with current HEP as prescribed in order to improve strength and balance. Short term goal 2: Pt will demonstrate B hip flexion strength at least 4/5 in order to improve strength. Short term goal 3: Pt will demonstrate the ability to safely complete at least 330 feet on the 2 minute walk test with no more than 1 instance of veering from a straight path. Short term goal 4: Pt will demonstrate a LEFs score of no more than 42% disability in order to improve quality of life. Subjective:  See eval     Any changes in Ambulatory Summary Sheet?   None    Objective:  See eval     Exercises: (No more than 4 columns)   Exercise/Equipment Date: 23 Date Date           WARM UP      NuStep               TABLE      Bridges with ball squeeze      Clamshells #1/2      Side lying SLR      SAQ w/ wt               STANDING/PROPRIOCEPTION      Hurdles forward      Hurdles lateral

## 2023-11-15 ENCOUNTER — HOSPITAL ENCOUNTER (OUTPATIENT)
Dept: PHYSICAL THERAPY | Age: 88
Setting detail: THERAPIES SERIES
Discharge: HOME | End: 2023-11-15
Payer: MEDICARE

## 2023-11-15 PROCEDURE — 97112 NEUROMUSCULAR REEDUCATION: CPT

## 2023-11-15 PROCEDURE — 97110 THERAPEUTIC EXERCISES: CPT

## 2023-11-15 NOTE — FLOWSHEET NOTE
Outpatient Physical Therapy  [place]           [] Phone: [phone]   Fax: [phone]  [place] [facility] [place]           [x] Phone: [phone]   Fax: [phone]        Physical Therapy Daily Treatment Note  Date:  11/15/2023    Patient Name:  Dallas Ellington    :  1935  MRN: [id number]  Restrictions/Precautions: fall risk  Diagnosis:   Unsteady gait [R26.81]    Date of Injury/Surgery: chronic about 1 year  Treatment Diagnosis:   M62.81 muscle weakness, R26.89 abnormality of gait  Insurance/Certification information:  Medicare  Referring Physician:  HAZEL Robledo *     PCP: HAZEL Robledo - NP  Plan of care signed (Y/N):  jadyn atkinson sign faxed. Outcome Measure: LEFs: 40/80 = 50% ability = 50% disability   2 MWT: 308.1 feet  Oconnell Balance Test: 47  Tinetti Gait: 10  Tinetti Balance: 14  Combined: 24  Visit# / total visits:   2/10 then PN  Pain level: 0/10   Goals:     Patient goals:   \"to improve balance and strength\"    Short term goals to be achieved by 2023:  Short term goal 1: Pt will report compliance with current HEP as prescribed in order to improve strength and balance. Short term goal 2: Pt will demonstrate B hip flexion strength at least 4/5 in order to improve strength. Short term goal 3: Pt will demonstrate the ability to safely complete at least 330 feet on the 2 minute walk test with no more than 1 instance of veering from a straight path. Short term goal 4: Pt will demonstrate a LEFs score of no more than 42% disability in order to improve quality of life. Subjective:  Patient states that her right knee has been sore since she started therapy. Having some increased soreness today from working in her flower beds. States that she is tired today    Any changes in Ambulatory Summary Sheet?   None    Objective:  Fingertip to [facility] for balance exercises as needed    Exercises: (No more than 4 columns)   Exercise/Equipment Date: 11/15/23 Date Date           WARM

## 2023-11-21 ENCOUNTER — OFFICE VISIT (OUTPATIENT)
Dept: FAMILY MEDICINE CLINIC | Age: 88
End: 2023-11-21
Payer: MEDICARE

## 2023-11-21 VITALS
HEART RATE: 76 BPM | RESPIRATION RATE: 16 BRPM | SYSTOLIC BLOOD PRESSURE: 110 MMHG | OXYGEN SATURATION: 99 % | WEIGHT: 173.6 LBS | TEMPERATURE: 97.5 F | DIASTOLIC BLOOD PRESSURE: 70 MMHG | BODY MASS INDEX: 28.89 KG/M2

## 2023-11-21 DIAGNOSIS — S01.80XD OPEN WOUND OF FACE, SUBSEQUENT ENCOUNTER: ICD-10-CM

## 2023-11-21 PROBLEM — S01.80XA OPEN WOUND OF FACE: Status: ACTIVE | Noted: 2023-11-21

## 2023-11-21 PROCEDURE — G8427 DOCREV CUR MEDS BY ELIG CLIN: HCPCS | Performed by: NURSE PRACTITIONER

## 2023-11-21 PROCEDURE — 1123F ACP DISCUSS/DSCN MKR DOCD: CPT | Performed by: NURSE PRACTITIONER

## 2023-11-21 PROCEDURE — 1036F TOBACCO NON-USER: CPT | Performed by: NURSE PRACTITIONER

## 2023-11-21 PROCEDURE — G8417 CALC BMI ABV UP PARAM F/U: HCPCS | Performed by: NURSE PRACTITIONER

## 2023-11-21 PROCEDURE — 1090F PRES/ABSN URINE INCON ASSESS: CPT | Performed by: NURSE PRACTITIONER

## 2023-11-21 PROCEDURE — G8484 FLU IMMUNIZE NO ADMIN: HCPCS | Performed by: NURSE PRACTITIONER

## 2023-11-21 PROCEDURE — 99213 OFFICE O/P EST LOW 20 MIN: CPT | Performed by: NURSE PRACTITIONER

## 2023-11-21 ASSESSMENT — ENCOUNTER SYMPTOMS
ABDOMINAL PAIN: 0
COLOR CHANGE: 0
CHEST TIGHTNESS: 0
VOMITING: 0
SHORTNESS OF BREATH: 0
DIARRHEA: 0
CONSTIPATION: 0
NAUSEA: 0
COUGH: 0
SORE THROAT: 0
STRIDOR: 0
WHEEZING: 0

## 2023-11-27 ENCOUNTER — HOSPITAL ENCOUNTER (OUTPATIENT)
Dept: PHYSICAL THERAPY | Age: 88
Setting detail: THERAPIES SERIES
Discharge: HOME | End: 2023-11-27
Payer: MEDICARE

## 2023-11-27 PROCEDURE — 97110 THERAPEUTIC EXERCISES: CPT

## 2023-11-27 PROCEDURE — 97112 NEUROMUSCULAR REEDUCATION: CPT

## 2023-11-27 NOTE — FLOWSHEET NOTE
Outpatient Physical Therapy  [place]           [] Phone: [phone]   Fax: [phone]  [facility]           [x] Phone: [phone]   Fax: [phone]        Physical Therapy Daily Treatment Note  Date:  2023    Patient Name:  Dorothy Meza    :  1935  MRN: [id number]  Restrictions/Precautions: fall risk  Diagnosis:   Unsteady gait [R26.81]    Date of Injury/Surgery: chronic about 1 year  Treatment Diagnosis:   M62.81 muscle weakness, R26.89 abnormality of gait  Insurance/Certification information:  Medicare  Referring Physician:  HAZEL Us *     PCP: HAZEL Us - NP  Plan of care signed (Y/N):  jadyn atkinson sign faxed. Outcome Measure: LEFs: 40/80 = 50% ability = 50% disability   2 MWT: 308.1 feet  Oconnell Balance Test: 47  Tinetti Gait: 10  Tinetti Balance: 14  Combined: 24  Visit# / total visits:   3/10 then PN  Pain level: 5-6/10   Goals:     Patient goals:   \"to improve balance and strength\"    Short term goals to be achieved by 2023:  Short term goal 1: Pt will report compliance with current HEP as prescribed in order to improve strength and balance. Short term goal 2: Pt will demonstrate B hip flexion strength at least 4/5 in order to improve strength. Short term goal 3: Pt will demonstrate the ability to safely complete at least 330 feet on the 2 minute walk test with no more than 1 instance of veering from a straight path. Short term goal 4: Pt will demonstrate a LEFs score of no more than 42% disability in order to improve quality of life. Subjective:    Pt states that she is tired today and reports pain in her R hip and R shoulder 5-6/10. Any changes in Ambulatory Summary Sheet?   None    Objective:  Fingertip assist for balance exercises as needed and no UE support for foam exercises    Exercises: (No more than 4 columns)   Exercise/Equipment Date: 11/15/23 Date   Date           WARM UP      NuStep    Seat 8/ Arms 9 Lev 3 x 10

## 2023-12-01 ENCOUNTER — NURSE ONLY (OUTPATIENT)
Dept: FAMILY MEDICINE CLINIC | Age: 88
End: 2023-12-01
Payer: MEDICARE

## 2023-12-01 DIAGNOSIS — R30.0 DYSURIA: Primary | ICD-10-CM

## 2023-12-01 DIAGNOSIS — N30.00 ACUTE CYSTITIS WITHOUT HEMATURIA: Primary | ICD-10-CM

## 2023-12-01 LAB
BILIRUBIN, POC: ABNORMAL
BLOOD URINE, POC: ABNORMAL
CLARITY, POC: ABNORMAL
COLOR, POC: YELLOW
GLUCOSE URINE, POC: ABNORMAL
KETONES, POC: ABNORMAL
LEUKOCYTE EST, POC: ABNORMAL
NITRITE, POC: POSITIVE
PH, POC: 5.5
PROTEIN, POC: ABNORMAL
SPECIFIC GRAVITY, POC: 1.02
UROBILINOGEN, POC: 0.2

## 2023-12-01 PROCEDURE — 81002 URINALYSIS NONAUTO W/O SCOPE: CPT | Performed by: NURSE PRACTITIONER

## 2023-12-01 RX ORDER — CLOPIDOGREL BISULFATE 75 MG/1
75 TABLET ORAL DAILY
Qty: 90 TABLET | Refills: 1 | Status: SHIPPED | OUTPATIENT
Start: 2023-12-01

## 2023-12-01 RX ORDER — SULFAMETHOXAZOLE AND TRIMETHOPRIM 800; 160 MG/1; MG/1
1 TABLET ORAL 2 TIMES DAILY
Qty: 6 TABLET | Refills: 0 | Status: SHIPPED | OUTPATIENT
Start: 2023-12-01 | End: 2023-12-04

## 2023-12-01 NOTE — TELEPHONE ENCOUNTER
Medication:   Requested Prescriptions     Pending Prescriptions Disp Refills    clopidogrel (PLAVIX) 75 MG tablet [Pharmacy Med Name: CLOPIDOGREL 75 MG TABLET 75 Tablet] 90 tablet 1     Sig: TAKE 1 TABLET BY MOUTH EVERY DAY        Last Filled:  9 months ago    Patient Phone Number: [phone] (home)     Last appt: 11/21/2023   Next appt: 5/9/2024    Last OARRS:        No data to display

## 2023-12-03 LAB
BACTERIA UR CULT: ABNORMAL
ORGANISM: ABNORMAL

## 2023-12-04 ENCOUNTER — HOSPITAL ENCOUNTER (OUTPATIENT)
Dept: PHYSICAL THERAPY | Age: 88
Discharge: HOME | End: 2023-12-04

## 2023-12-04 ENCOUNTER — TELEPHONE (OUTPATIENT)
Dept: FAMILY MEDICINE CLINIC | Age: 88
End: 2023-12-04

## 2023-12-04 DIAGNOSIS — N30.00 ACUTE CYSTITIS WITHOUT HEMATURIA: ICD-10-CM

## 2023-12-04 LAB
BACTERIA UR CULT: ABNORMAL
ORGANISM: ABNORMAL

## 2023-12-04 RX ORDER — SULFAMETHOXAZOLE AND TRIMETHOPRIM 800; 160 MG/1; MG/1
1 TABLET ORAL 2 TIMES DAILY
Qty: 8 TABLET | Refills: 0 | Status: SHIPPED | OUTPATIENT
Start: 2023-12-04 | End: 2023-12-08

## 2023-12-04 NOTE — FLOWSHEET NOTE
Physical Therapy  Cancellation/No-show Note  Patient Name:  Aries Mendoza  :  1935   Date:  2023  Cancelled visits to date: 0  No-shows to date: 0    For today's appointment patient:  [x]  Cancelled  []  Rescheduled appointment  []  No-show     Reason given by patient:  []  Patient ill  []  Conflicting appointment  []  No transportation    []  Conflict with work  [x]  No reason given  []  Other:     Comments:      Electronically signed by:  Ninfa Stringer PT,DPT [id number]  2023, 9:40 AM

## 2023-12-04 NOTE — TELEPHONE ENCOUNTER
Patient called to see if we got results yet for her UA. She is done with her medicine today and still has stinging when she urinates.

## 2023-12-06 RX ORDER — PROPRANOLOL HYDROCHLORIDE 10 MG/1
10 TABLET ORAL 3 TIMES DAILY
Qty: 270 TABLET | Refills: 3 | Status: SHIPPED | OUTPATIENT
Start: 2023-12-06

## 2023-12-07 ENCOUNTER — OFFICE VISIT (OUTPATIENT)
Dept: FAMILY MEDICINE CLINIC | Age: 88
End: 2023-12-07
Payer: MEDICARE

## 2023-12-07 VITALS
RESPIRATION RATE: 16 BRPM | HEART RATE: 74 BPM | HEIGHT: 65 IN | DIASTOLIC BLOOD PRESSURE: 68 MMHG | BODY MASS INDEX: 28.49 KG/M2 | TEMPERATURE: 97.2 F | SYSTOLIC BLOOD PRESSURE: 112 MMHG | OXYGEN SATURATION: 97 % | WEIGHT: 171 LBS

## 2023-12-07 DIAGNOSIS — R05.1 ACUTE COUGH: ICD-10-CM

## 2023-12-07 DIAGNOSIS — J06.9 VIRAL URI: Primary | ICD-10-CM

## 2023-12-07 DIAGNOSIS — R25.1 TREMOR: ICD-10-CM

## 2023-12-07 PROCEDURE — G8427 DOCREV CUR MEDS BY ELIG CLIN: HCPCS | Performed by: NURSE PRACTITIONER

## 2023-12-07 PROCEDURE — G8417 CALC BMI ABV UP PARAM F/U: HCPCS | Performed by: NURSE PRACTITIONER

## 2023-12-07 PROCEDURE — 1123F ACP DISCUSS/DSCN MKR DOCD: CPT | Performed by: NURSE PRACTITIONER

## 2023-12-07 PROCEDURE — 1036F TOBACCO NON-USER: CPT | Performed by: NURSE PRACTITIONER

## 2023-12-07 PROCEDURE — G8484 FLU IMMUNIZE NO ADMIN: HCPCS | Performed by: NURSE PRACTITIONER

## 2023-12-07 PROCEDURE — 99213 OFFICE O/P EST LOW 20 MIN: CPT | Performed by: NURSE PRACTITIONER

## 2023-12-07 PROCEDURE — 87502 INFLUENZA DNA AMP PROBE: CPT | Performed by: NURSE PRACTITIONER

## 2023-12-07 PROCEDURE — 1090F PRES/ABSN URINE INCON ASSESS: CPT | Performed by: NURSE PRACTITIONER

## 2023-12-07 NOTE — PROGRESS NOTES
Subjective:      Chief Complaint   Patient presents with    Shaking    Fatigue     Weakness     Off Balance     Cough    Nasal Congestion       HPI:  Vivian Sykes is a 81 y.o. female who presents today for the following  symptoms:     URI  She c/o headache, fatigue, weakness, nasal congestion and non-productive cough. No fever, chills. She denies any other symptoms. The pharmacy tested her for covid yesterday and was negative. She has been taking Robitussin which is somewhat helpful. Tremor  She has decided that she would like a referral to neurology to discuss worsening tremors. She is taking Propranolol TID without any improvements. Past Medical History:   Diagnosis Date    Age-related nuclear cataract of left eye 3/8/2022    Age-related nuclear cataract of right eye 2/11/2022    Anxiety     Coronary artery disease involving native coronary artery of native heart without angina pectoris 03/20/2018    Ectatic vessel with sluggish blood flow 2004 cath    Essential tremor     H/O cardiac catheterization 1993, 2004    Ectasia of cornaries, normal EF    H/O cardiovascular stress test 04/29/2014    Stress Cardiolite. Normal perfusion in the distribution of all coronaries, normal LV size and function, EF 70%. H/O echocardiogram 01/14/2021    EF 55-60%. E/A reversal suggests abnormal LV relaxation. Mild MR.    H/O exercise stress test 05/1993    Heart murmur     Heart palpitations     Hyperlipidemia     MVP (mitral valve prolapse)     Obesity     Palpitations     PVCs and history of mitral valve prolapse    Tachycardia     Uterine prolapse 11/4/2019    Last Assessment & Plan:  Formatting of this note might be different from the original. Vivian Sykes is a 81 y.o. female with PMHx HLD, hx TIA, tremor, IBS, MVP, OA, low back pain, hip pain who is POD#1  from [facility], vaginectomy, LeFort colpocleisis, vulvar biopsy, cystoscopy, anal sphinteroplasty, posterior repair, perineoplasty (Dr. Mahamed Edge).   - HD stable -

## 2023-12-11 ASSESSMENT — ENCOUNTER SYMPTOMS
ABDOMINAL PAIN: 0
STRIDOR: 0
CHEST TIGHTNESS: 0
DIARRHEA: 0
SHORTNESS OF BREATH: 0
RHINORRHEA: 1
COUGH: 1
SORE THROAT: 0
NAUSEA: 0
VOMITING: 0
WHEEZING: 0
SINUS PAIN: 0
SINUS PRESSURE: 0
CONSTIPATION: 0

## 2024-01-05 ENCOUNTER — HOSPITAL ENCOUNTER (OUTPATIENT)
Dept: PHYSICAL THERAPY | Age: 89
Setting detail: THERAPIES SERIES
Discharge: HOME | End: 2024-01-05
Payer: MEDICARE

## 2024-01-05 PROCEDURE — 97112 NEUROMUSCULAR REEDUCATION: CPT

## 2024-01-05 NOTE — FLOWSHEET NOTE
Outpatient Physical Therapy  [place]           [] Phone: [phone]   Fax: [phone]  [place]           [x] Phone: [phone]   Fax: [phone]        Physical Therapy Daily Treatment Note  Date:  2024    Patient Name:  Karime Booker    :  1935  MRN: [id number]  Restrictions/Precautions: fall risk  Diagnosis:   Unsteady gait [R26.81]    Date of Injury/Surgery: chronic about 1 year  Treatment Diagnosis:   M62.81 muscle weakness, R26.89 abnormality of gait  Insurance/Certification information:  Medicare  Referring Physician:  Maria Ines Romero APRN *     PCP: Maria Ines Romero APRN - NP  Plan of care signed (Y/N):  jadyn co sign faxed.   Outcome Measure: LEFs: 40/80 = 50% ability = 50% disability   2 MWT: 308.1 feet  Oconnell Balance Test: 47  Tinetti Gait: 10  Tinetti Balance: 14  Combined: 24  Visit# / total visits:   4/10 then PN  Pain level: 0/10   Goals:     Patient goals:   \"to improve balance and strength\"    Short term goal 1: Pt will report compliance with current HEP as prescribed in order to improve strength and balance.   Short term goal 2: Pt will demonstrate B hip flexion strength at least 4/5 in order to improve strength.   Short term goal 3: Pt will demonstrate the ability to safely complete at least 330 feet on the 2 minute walk test with no more than 1 instance of veering from a straight path.   Short term goal 4: Pt will demonstrate a LEFs score of no more than 42% disability in order to improve quality of life.      Subjective:    Pt states balance is main issue; has not been able to attend PT due to illness  Any changes in Ambulatory Summary Sheet?  None    Objective:   Exercises: (No more than 4 columns)   Exercise/Equipment Date: 11/15/23 Date   Date 24          Needs 2 MWT   WARM UP       NuStep    Seat 8/ Arms 9 Lev 3 x 10 min  Seat 8/ Arms 9 Lev 3 x 10 min  Seat 8/ Arms 9 Lev 3 x 5 min            TABLE       Bridges with ball squeeze 10x 10x --

## 2024-01-08 ENCOUNTER — HOSPITAL ENCOUNTER (OUTPATIENT)
Dept: PHYSICAL THERAPY | Age: 89
Setting detail: THERAPIES SERIES
Discharge: HOME | End: 2024-01-08
Payer: MEDICARE

## 2024-01-08 PROCEDURE — 97112 NEUROMUSCULAR REEDUCATION: CPT

## 2024-01-08 NOTE — FLOWSHEET NOTE
Outpatient Physical Therapy  [place]           [] Phone: [phone]   Fax: [phone]  [place]           [x] Phone: [phone]   Fax: [phone]        Physical Therapy Daily Treatment Note  Date:  2024    Patient Name:  Karime Booker    :  1935  MRN: [id number]  Restrictions/Precautions: fall risk  Diagnosis:   Unsteady gait [R26.81]    Date of Injury/Surgery: chronic about 1 year  Treatment Diagnosis:   M62.81 muscle weakness, R26.89 abnormality of gait  Insurance/Certification information:  Medicare  Referring Physician:  Maria Ines Romero APRN *     PCP: Maria Ines Romero APRN - NP  Plan of care signed (Y/N):  jadyn co sign faxed.   Outcome Measure: LEFs: 40/80 = 50% ability = 50% disability   2 MWT: 308.1 feet  Oconnell Balance Test: 47  Tinetti Gait: 10  Tinetti Balance: 14  Combined: 24  Visit# / total visits:   5/10 then PN  Pain level: 0/10   Goals:     Patient goals:   \"to improve balance and strength\"    Short term goal 1: Pt will report compliance with current HEP as prescribed in order to improve strength and balance.   Short term goal 2: Pt will demonstrate B hip flexion strength at least 4/5 in order to improve strength.   Short term goal 3: Pt will demonstrate the ability to safely complete at least 330 feet on the 2 minute walk test with no more than 1 instance of veering from a straight path.   Short term goal 4: Pt will demonstrate a LEFs score of no more than 42% disability in order to improve quality of life.      Subjective:   \"I'm still wobbly\"  States that she lost her balance walking down the aisle at [employer] and lost her balance, but was able to to catch herself on chair.  Any changes in Ambulatory Summary Sheet?  None    Objective:  2 MWT: 357 feet   Exercises: (No more than 4 columns)   Exercise/Equipment Date: 11/15/23 Date   Date 24         Needs 2 MWT   WARM UP       NuStep    Seat 8/ Arms 9 Lev 3 x 10 min  Seat 8/ Arms 9 Lev 3 x 10 min  Seat

## 2024-01-10 ENCOUNTER — HOSPITAL ENCOUNTER (OUTPATIENT)
Dept: PHYSICAL THERAPY | Age: 89
Setting detail: THERAPIES SERIES
Discharge: HOME | End: 2024-01-10
Payer: MEDICARE

## 2024-01-10 PROCEDURE — 97110 THERAPEUTIC EXERCISES: CPT

## 2024-01-10 PROCEDURE — 97112 NEUROMUSCULAR REEDUCATION: CPT

## 2024-01-10 NOTE — FLOWSHEET NOTE
5 min          TABLE      Bridges with ball squeeze --     Kyle #1/2 -     Side lying SLR --     SAQ w/ wt Md roll 5\" x 15 R/L Md roll 5\" x 20 R/L Md roll 5\" x 20 R/L            STANDING/PROPRIOCEPTION      Hurdles forward Next  3 laps 3 laps   Hurdles lateral next 3 laps 3 laps   4 way hip w/ band hold     Foam pad marching   1.5 mins   Foam pad NBOS X30 sec 1 min  1 min   Foam pad NBOS w/ UE movement 1 foot on foam 1 foot on 4\" step - performed cane press outs multiple reps 1 foot on foam 1 foot on 4\" step - performed cane press outs   15x 1 foot on floor 1 foot on   Tandem walk  X 1 lap 1 lap with fingertip hold 3 laps FTT         MODALITIES                    Other Therapeutic Activities/Education:  Pt educated on PT findings, plan, prognosis and HEP. Pt expressed understanding and asked appropriate questions.     Home Exercise Program:  11/13 - SLS at counter and seated marching 10 sec hold    Manual Treatments:  none    Modalities:  none    Communication with other providers:  jadyn co sign sent    Assessment:  (Response towards treatment session) (Pain Rating)  0/10  Fatigued after exercises.          Plan for Next Session: Continue with above exercises     Time In / Time Out:    8485-7052    Timed Code/Total Treatment Minutes:  30 1te 1nr    Next Progress Note due:  12/20/23    Plan of Care Interventions:  [x] Therapeutic Exercise  [] Modalities:  [x] Therapeutic Activity     [] Ultrasound  [] Estim  [x] Gait Training      [] Cervical Traction [] Lumbar Traction  [x] Neuromuscular Re-education    [] Cold/hotpack [] Iontophoresis   [x] Instruction in HEP      [] Vasopneumatic   [] Dry Needling    [] Manual Therapy               [] Aquatic Therapy              Electronically signed by:  Nisreen Davis PTA  1/10/2024, 9:41 AM

## 2024-01-15 ENCOUNTER — HOSPITAL ENCOUNTER (OUTPATIENT)
Dept: PHYSICAL THERAPY | Age: 89
Setting detail: THERAPIES SERIES
Discharge: HOME | End: 2024-01-15
Payer: MEDICARE

## 2024-01-15 PROCEDURE — 97110 THERAPEUTIC EXERCISES: CPT

## 2024-01-15 PROCEDURE — 97112 NEUROMUSCULAR REEDUCATION: CPT

## 2024-01-15 PROCEDURE — 97530 THERAPEUTIC ACTIVITIES: CPT

## 2024-01-15 NOTE — FLOWSHEET NOTE
Outpatient Physical Therapy  [place]           [] Phone: [phone]   Fax: [phone]  [place]           [x] Phone: [phone]   Fax: [phone]        Physical Therapy Daily Treatment Note  Date:  1/15/2024    Patient Name:  Karime Booker    :  1935  MRN: [id number]  Restrictions/Precautions: fall risk  Diagnosis:   Unsteady gait [R26.81]    Date of Injury/Surgery: chronic about 1 year  Treatment Diagnosis:   M62.81 muscle weakness, R26.89 abnormality of gait  Insurance/Certification information:  Medicare  Referring Physician:  Maria Ines Romero APRN *     PCP: Maria Ines Romero APRN - NP  Plan of care signed (Y/N):  jadyn co sign faxed.   Outcome Measure: LEFs: 40/80 = 50% ability = 50% disability   2 MWT: 308.1 feet  Oconnell Balance Test: 47  Tinetti Gait: 10  Tinetti Balance: 14  Combined: 24  Visit# / total visits:   7/10 then PN  Pain level: 4/10 R knee  Goals:     Patient goals:   \"to improve balance and strength\"    Short term goal 1: Pt will report compliance with current HEP as prescribed in order to improve strength and balance.   Short term goal 2: Pt will demonstrate B hip flexion strength at least 4/5 in order to improve strength.   Short term goal 3: Pt will demonstrate the ability to safely complete at least 330 feet on the 2 minute walk test with no more than 1 instance of veering from a straight path.   Short term goal 4: Pt will demonstrate a LEFs score of no more than 42% disability in order to improve quality of life.      Subjective:    Patient reports of 4/10 pain in the right knee upon arrival and notes it mostly with WBing          Any changes in Ambulatory Summary Sheet?  None    Objective:  2 MWT: 357 feet         Exercises: (No more than 4 columns)   Exercise/Equipment 2024 1/10/24  (6) 1/15/24      Needs 2 MWT     WARM UP      NuStep    Seat 8/ Arms 9 Lev 3 x 5 min  Seat 8/ Arms 9 Lev 3 x 5 min  Seat 8/ Arms 9 Lev 3   6 min          TABLE      Bridges with

## 2024-01-17 ENCOUNTER — HOSPITAL ENCOUNTER (OUTPATIENT)
Dept: PHYSICAL THERAPY | Age: 89
Setting detail: THERAPIES SERIES
Discharge: HOME | End: 2024-01-17
Payer: MEDICARE

## 2024-01-17 ENCOUNTER — OFFICE VISIT (OUTPATIENT)
Dept: PULMONOLOGY | Age: 89
End: 2024-01-17
Payer: MEDICARE

## 2024-01-17 VITALS
SYSTOLIC BLOOD PRESSURE: 118 MMHG | WEIGHT: 170.7 LBS | OXYGEN SATURATION: 97 % | DIASTOLIC BLOOD PRESSURE: 64 MMHG | HEIGHT: 64 IN | BODY MASS INDEX: 29.14 KG/M2 | HEART RATE: 78 BPM

## 2024-01-17 DIAGNOSIS — E66.3 OVERWEIGHT (BMI 25.0-29.9): ICD-10-CM

## 2024-01-17 DIAGNOSIS — R93.89 ABNORMAL CT OF THE CHEST: Primary | ICD-10-CM

## 2024-01-17 DIAGNOSIS — R91.1 RIGHT LOWER LOBE PULMONARY NODULE: ICD-10-CM

## 2024-01-17 PROCEDURE — 97530 THERAPEUTIC ACTIVITIES: CPT

## 2024-01-17 PROCEDURE — G8427 DOCREV CUR MEDS BY ELIG CLIN: HCPCS | Performed by: INTERNAL MEDICINE

## 2024-01-17 PROCEDURE — 99204 OFFICE O/P NEW MOD 45 MIN: CPT | Performed by: INTERNAL MEDICINE

## 2024-01-17 PROCEDURE — 97112 NEUROMUSCULAR REEDUCATION: CPT

## 2024-01-17 PROCEDURE — 1090F PRES/ABSN URINE INCON ASSESS: CPT | Performed by: INTERNAL MEDICINE

## 2024-01-17 PROCEDURE — 97110 THERAPEUTIC EXERCISES: CPT

## 2024-01-17 PROCEDURE — 1036F TOBACCO NON-USER: CPT | Performed by: INTERNAL MEDICINE

## 2024-01-17 PROCEDURE — G8484 FLU IMMUNIZE NO ADMIN: HCPCS | Performed by: INTERNAL MEDICINE

## 2024-01-17 PROCEDURE — G8417 CALC BMI ABV UP PARAM F/U: HCPCS | Performed by: INTERNAL MEDICINE

## 2024-01-17 PROCEDURE — 1123F ACP DISCUSS/DSCN MKR DOCD: CPT | Performed by: INTERNAL MEDICINE

## 2024-01-17 ASSESSMENT — ENCOUNTER SYMPTOMS
EYE DISCHARGE: 0
COUGH: 0
EYE ITCHING: 0
ABDOMINAL DISTENTION: 0
ABDOMINAL PAIN: 0
BACK PAIN: 0
SHORTNESS OF BREATH: 0

## 2024-01-17 NOTE — FLOWSHEET NOTE
min           TABLE       Bridges with ball squeeze   2x10 2x10   Clamshells #1/2       Supine SLR   10x B 10x B   SAQ w/ wt Md roll 5\" x 20 R/L Md roll 5\" x 20 R/L Md roll 5\" x 20 R/L  1# B Md roll 5\" x 20 R/L              STANDING/PROPRIOCEPTION       Hurdles forward 3 laps 3 laps 3 laps 3 laps    Hurdles lateral 3 laps 3 laps 3 laps 3 laps   4 way hip w/ band       Foam pad marching  1.5 mins 1.5' 1.5'   Foam pad NBOS 1 min  1 min 1' 1'   Foam pad NBOS w/ UE movement 1 foot on foam 1 foot on 4\" step - performed cane press outs   15x 1 foot on floor 1 foot on 1 foot on foam 1 foot on 4\" step - performed cane press outs   15x 1 foot on foam 1 foot on 4\" step - performed cane press outs   15x   Tandem walk  1 lap with fingertip hold 3 laps FTT 3 laps FTT 3 laps FTT           MODALITIES                       Other Therapeutic Activities/Education:  Pt educated on PT findings, plan, prognosis and HEP. Pt expressed understanding and asked appropriate questions.     Home Exercise Program:  11/13 - SLS at counter and seated marching 10 sec hold    Manual Treatments:  none    Modalities:  none    Communication with other providers:  jadyn co sign sent    Assessment:  (Response towards treatment session) (Pain Rating)  3/10  Fatigued after exercises.          Plan for Next Session: Continue with above exercises     Time In / Time Out:    [id number]    Timed Code/Total Treatment Minutes:   38 1te 1ta 1nr     Next Progress Note due:  12/20/23    Plan of Care Interventions:  [x] Therapeutic Exercise  [] Modalities:  [x] Therapeutic Activity     [] Ultrasound  [] Estim  [x] Gait Training      [] Cervical Traction [] Lumbar Traction  [x] Neuromuscular Re-education    [] Cold/hotpack [] Iontophoresis   [x] Instruction in HEP      [] Vasopneumatic   [] Dry Needling    [] Manual Therapy               [] Aquatic Therapy              Electronically signed by:  Nisreen Davis PTA  1/17/2024, 3:30 PM

## 2024-01-17 NOTE — ASSESSMENT & PLAN NOTE
She has a 6 mm non calcified RLL nodule  Repeat CT chest in 1 year or earlier if she has any symptoms

## 2024-01-17 NOTE — PROGRESS NOTES
Karime Booker  1935  Referring Provider: Maria Ines Romero APRN - [facility]     Subjective:       PILAR Schaffer is a 88 y.o. female has been referred for the abnormal CT chest done on 12/18/23 which showed:    1. No acute findings within the chest to explain the patient's cough.  No  acute pulmonary infiltrate.  2. Noncalcified pulmonary nodules measuring up to 6 mm in the right lower  lobe.  Imaging follow-up recommendations below    She has no h/o smoking. She worked in the [employer] most of her life. She has little cough for a long time. She had COVID in 2021. She has little phlegm and PND, no hemoptysis, no loss of weight, fair appetite, SOBOE some. She is not on oxygen. She is not on any inhalers. She has no h/o cancers. She has cats outside nothing inside the house. 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 She had no flu vaccine this year.    Current Outpatient Medications   Medication Sig Dispense Refill    propranolol (INDERAL) 10 MG tablet TAKE 1 TABLET BY MOUTH THREE TIMES A  tablet 3    clopidogrel (PLAVIX) 75 MG tablet TAKE 1 TABLET BY MOUTH EVERY DAY 90 tablet 1    celecoxib (CELEBREX) 200 MG capsule Take 1 capsule by mouth every other day Pt taking every other day 45 capsule 3    nitroGLYCERIN (NITROSTAT) 0.3 MG SL tablet Place 1 tablet under the tongue every 5 minutes as needed for Chest pain (1-3 doses a day) 25 tablet 1    Omega-3 Fatty Acids (OMEGA-3 FISH OIL PO) Take by mouth daily      Multiple Vitamins-Minerals (PRESERVISION AREDS) CAPS Take 1 capsule by mouth 2 times daily      Cholecalciferol (VITAMIN D-3 PO) Take by mouth daily       albuterol sulfate HFA (VENTOLIN HFA) 108 (90 Base) MCG/ACT inhaler Inhale 2 puffs into the lungs 4 times daily as needed for Wheezing (Patient not taking: Reported on 1/17/2024) 54 g 1     No current facility-administered medications for this visit.       Allergies   Allergen Reactions    Lescol      GI Symptoms    Lipitor      Myalgia

## 2024-01-22 ENCOUNTER — HOSPITAL ENCOUNTER (OUTPATIENT)
Dept: PHYSICAL THERAPY | Age: 89
Setting detail: THERAPIES SERIES
Discharge: HOME | End: 2024-01-22
Payer: MEDICARE

## 2024-01-22 PROCEDURE — 97110 THERAPEUTIC EXERCISES: CPT

## 2024-01-22 PROCEDURE — 97112 NEUROMUSCULAR REEDUCATION: CPT

## 2024-01-22 PROCEDURE — 97530 THERAPEUTIC ACTIVITIES: CPT

## 2024-01-22 NOTE — FLOWSHEET NOTE
Outpatient Physical Therapy  [place]           [] Phone: [phone]   Fax: [phone]  [place]           [x] Phone: [phone]   Fax: [phone]        Physical Therapy Daily Treatment Note  Date:  2024    Patient Name:  Karime Booker    :  1935  MRN: [id number]  Restrictions/Precautions: fall risk  Diagnosis:   Unsteady gait [R26.81]    Date of Injury/Surgery: chronic about 1 year  Treatment Diagnosis:   M62.81 muscle weakness, R26.89 abnormality of gait  Insurance/Certification information:  Medicare  Referring Physician:  Maria Ines Romero APRN *     PCP: Maria Ines Romero APRN - NP  Plan of care signed (Y/N):  jadyn co sign faxed.   Outcome Measure: LEFs: 40/80 = 50% ability = 50% disability   2 MWT: 308.1 feet  Oconnell Balance Test: 47  Tinetti Gait: 10  Tinetti Balance: 14  Combined: 24  Visit# / total visits:   9/10 then PN  Pain level: 4/10 R knee    Goals:     Patient goals:   \"to improve balance and strength\"    Short term goal 1: Pt will report compliance with current HEP as prescribed in order to improve strength and balance.   Short term goal 2: Pt will demonstrate B hip flexion strength at least 4/5 in order to improve strength.   Short term goal 3: Pt will demonstrate the ability to safely complete at least 330 feet on the 2 minute walk test with no more than 1 instance of veering from a straight path.   Short term goal 4: Pt will demonstrate a LEFs score of no more than 42% disability in order to improve quality of life.      Subjective:   Having pain in the neck and shoulders this morning, but states that is normal.            Any changes in Ambulatory Summary Sheet?  None    Objective:  One moderate LOB while on blue foam, but patient was able to right independently.          Exercises: (No more than 4 columns)   Exercise/Equipment 1/15/24 1/17/24 2024             WARM UP    Needs LEFS and 2 MWT   NuStep    Seat 8/ Arms 9 Lev 3   6 min  Seat 8/ Arms 9 Lev 3   7 min

## 2024-01-24 ENCOUNTER — HOSPITAL ENCOUNTER (OUTPATIENT)
Dept: PHYSICAL THERAPY | Age: 89
Setting detail: THERAPIES SERIES
Discharge: HOME | End: 2024-01-24
Payer: MEDICARE

## 2024-01-24 PROCEDURE — 97530 THERAPEUTIC ACTIVITIES: CPT

## 2024-01-24 PROCEDURE — 97110 THERAPEUTIC EXERCISES: CPT

## 2024-01-24 PROCEDURE — 97112 NEUROMUSCULAR REEDUCATION: CPT

## 2024-01-24 NOTE — FLOWSHEET NOTE
Outpatient Physical Therapy  [place]           [] Phone: [phone]   Fax: [phone]  [place]           [x] Phone: [phone]   Fax: [phone]        Physical Therapy Daily Treatment Note  Date:  2024    Patient Name:  Karime Booker    :  1935  MRN: [id number]  Restrictions/Precautions: fall risk  Diagnosis:   Unsteady gait [R26.81]    Date of Injury/Surgery: chronic about 1 year  Treatment Diagnosis:   M62.81 muscle weakness, R26.89 abnormality of gait  Insurance/Certification information:  Medicare  Referring Physician:  Maria Ines Romero APRN *     PCP: Maria Ines Romero APRN - NP  Plan of care signed (Y/N):  jadyn co sign faxed.   Outcome Measure: LEFs: 40/80 = 50% ability = 50% disability   2 MWT: 308.1 feet  Oconnell Balance Test: 47  Tinetti Gait: 10  Tinetti Balance: 14  Combined: 24  Visit# / total visits:   10/10 then PN  Pain level: 4/10 R knee    Goals:     Patient goals:   \"to improve balance and strength\"    Short term goal 1: Pt will report compliance with current HEP as prescribed in order to improve strength and balance.   Short term goal 2: Pt will demonstrate B hip flexion strength at least 4/5 in order to improve strength.   Short term goal 3: Pt will demonstrate the ability to safely complete at least 330 feet on the 2 minute walk test with no more than 1 instance of veering from a straight path.   Short term goal 4: Pt will demonstrate a LEFs score of no more than 42% disability in order to improve quality of life.      Subjective:   Having pain in the neck and shoulders this morning, but states that is normal.            Any changes in Ambulatory Summary Sheet?  None    Objective:  One moderate LOB while on blue foam, but patient was able to use parallel bars for safety.   LEFS of 43/80  2MWT 340 ft.        Exercises: (No more than 4 columns)   Exercise/Equipment 1/15/24 1/17/24 2024 1/24/24            WARM UP    Needs LEFS and 2 MWT   NuStep    Seat 8/ Arms 9

## 2024-02-19 ENCOUNTER — OFFICE VISIT (OUTPATIENT)
Dept: CARDIOLOGY CLINIC | Age: 89
End: 2024-02-19
Payer: MEDICARE

## 2024-02-19 VITALS
HEIGHT: 64 IN | SYSTOLIC BLOOD PRESSURE: 124 MMHG | HEART RATE: 78 BPM | BODY MASS INDEX: 29.81 KG/M2 | WEIGHT: 174.6 LBS | DIASTOLIC BLOOD PRESSURE: 68 MMHG

## 2024-02-19 DIAGNOSIS — R00.2 PALPITATIONS: Primary | ICD-10-CM

## 2024-02-19 DIAGNOSIS — I25.10 CORONARY ARTERY DISEASE INVOLVING NATIVE CORONARY ARTERY OF NATIVE HEART WITHOUT ANGINA PECTORIS: ICD-10-CM

## 2024-02-19 DIAGNOSIS — R25.1 TREMORS OF NERVOUS SYSTEM: ICD-10-CM

## 2024-02-19 DIAGNOSIS — E78.2 MIXED HYPERLIPIDEMIA: ICD-10-CM

## 2024-02-19 PROCEDURE — G8417 CALC BMI ABV UP PARAM F/U: HCPCS | Performed by: INTERNAL MEDICINE

## 2024-02-19 PROCEDURE — 1036F TOBACCO NON-USER: CPT | Performed by: INTERNAL MEDICINE

## 2024-02-19 PROCEDURE — G8484 FLU IMMUNIZE NO ADMIN: HCPCS | Performed by: INTERNAL MEDICINE

## 2024-02-19 PROCEDURE — 1090F PRES/ABSN URINE INCON ASSESS: CPT | Performed by: INTERNAL MEDICINE

## 2024-02-19 PROCEDURE — 1123F ACP DISCUSS/DSCN MKR DOCD: CPT | Performed by: INTERNAL MEDICINE

## 2024-02-19 PROCEDURE — 93000 ELECTROCARDIOGRAM COMPLETE: CPT | Performed by: INTERNAL MEDICINE

## 2024-02-19 PROCEDURE — G8427 DOCREV CUR MEDS BY ELIG CLIN: HCPCS | Performed by: INTERNAL MEDICINE

## 2024-02-19 PROCEDURE — 99213 OFFICE O/P EST LOW 20 MIN: CPT | Performed by: INTERNAL MEDICINE

## 2024-02-19 RX ORDER — CLOPIDOGREL BISULFATE 75 MG/1
75 TABLET ORAL DAILY
Qty: 90 TABLET | Refills: 3 | Status: SHIPPED | OUTPATIENT
Start: 2024-02-19

## 2024-02-19 RX ORDER — MULTIVIT WITH MINERALS/LUTEIN
250 TABLET ORAL DAILY
COMMUNITY

## 2024-02-19 NOTE — PROGRESS NOTES
medications.  Her last lipids were in 2022   4. Tremors of nervous system  Assessment & Plan:  She is on Inderal following up with PCP.      Continue current cardiovascular medications which have been reviewed and discussed individually with you. Primary prevention is the goal by aggressive risk modification, healthy and therapeutic life style changes for cardiovascular risk reduction. Various goals are discussed and questions answered.  Appropriate prescriptions if needed on this visit are addressed. After visit summery is provided.   Questions answered and patient verbalizes understanding. Follow up in 12 months with ECG,  sooner if needed.    Edward Amador MD, 2/19/2024 2:02 PM     Please note this report has been partially produced using speech recognition software and may contain errors related to that system including errors in grammar, punctuation, and spelling, as well as words and phrases that may be inappropriate. If there are any questions or concerns please feel free to contact the dictating provider for clarification.

## 2024-02-19 NOTE — ASSESSMENT & PLAN NOTE
Patient has atypical chest pains doubt cardiac in etiology we will continue risk factor modification and follow-up.  Counseled to take as needed nitroglycerin for possible coronary spasm or esophageal spasm particularly if the symptoms last longer than 5 minutes.

## 2024-02-19 NOTE — PATIENT INSTRUCTIONS
Continue current cardiovascular medications which have been reviewed and discussed individually with you. Primary prevention is the goal by aggressive risk modification, healthy and therapeutic life style changes for cardiovascular risk reduction. Various goals are discussed and questions answered.  Appropriate prescriptions if needed on this visit are addressed. After visit summery is provided.   Questions answered and patient verbalizes understanding. Follow up in 12 months with ECG,  sooner if needed.

## 2024-02-21 ENCOUNTER — HOSPITAL ENCOUNTER (OUTPATIENT)
Age: 89
Discharge: HOME | End: 2024-02-21
Payer: MEDICARE

## 2024-02-21 PROCEDURE — 82552 ASSAY OF CPK IN BLOOD: CPT

## 2024-02-21 PROCEDURE — 36415 COLL VENOUS BLD VENIPUNCTURE: CPT

## 2024-02-25 LAB
CK BB CFR SERPL ELPH: 0 % (ref 0–0)
CK MACRO1 CFR SERPL: 78 % (ref 0–0)
CK MACRO2 CFR SERPL: 0 % (ref 0–0)
CK MB CFR SERPL ELPH: 0 % (ref 0–4)
CK MM CFR SERPL ELPH: 22 % (ref 96–100)
CK SERPL-CCNC: 327 U/L (ref 26–192)

## 2024-03-08 ENCOUNTER — APPOINTMENT (OUTPATIENT)
Dept: CT IMAGING | Age: 89
End: 2024-03-08
Payer: MEDICARE

## 2024-03-08 ENCOUNTER — HOSPITAL ENCOUNTER (EMERGENCY)
Age: 89
Discharge: HOME | End: 2024-03-08
Attending: STUDENT IN AN ORGANIZED HEALTH CARE EDUCATION/TRAINING PROGRAM
Payer: MEDICARE

## 2024-03-08 VITALS
BODY MASS INDEX: 29.71 KG/M2 | SYSTOLIC BLOOD PRESSURE: 142 MMHG | RESPIRATION RATE: 23 BRPM | TEMPERATURE: 97.8 F | OXYGEN SATURATION: 95 % | DIASTOLIC BLOOD PRESSURE: 81 MMHG | WEIGHT: 174 LBS | HEIGHT: 64 IN | HEART RATE: 85 BPM

## 2024-03-08 DIAGNOSIS — S12.120A CLOSED ODONTOID FRACTURE WITH TYPE III MORPHOLOGY, INITIAL ENCOUNTER (HCC): Primary | ICD-10-CM

## 2024-03-08 PROCEDURE — 72125 CT NECK SPINE W/O DYE: CPT

## 2024-03-08 PROCEDURE — 70450 CT HEAD/BRAIN W/O DYE: CPT

## 2024-03-08 PROCEDURE — 99284 EMERGENCY DEPT VISIT MOD MDM: CPT

## 2024-03-08 RX ORDER — OXYCODONE HYDROCHLORIDE 5 MG/1
5 TABLET ORAL EVERY 6 HOURS PRN
Qty: 12 TABLET | Refills: 0 | Status: SHIPPED | OUTPATIENT
Start: 2024-03-08 | End: 2024-03-11

## 2024-03-08 RX ORDER — ACETAMINOPHEN 500 MG
1000 TABLET ORAL 3 TIMES DAILY PRN
Qty: 42 TABLET | Refills: 0 | Status: SHIPPED | OUTPATIENT
Start: 2024-03-08 | End: 2024-03-15

## 2024-03-08 ASSESSMENT — LIFESTYLE VARIABLES
HOW MANY STANDARD DRINKS CONTAINING ALCOHOL DO YOU HAVE ON A TYPICAL DAY: PATIENT DOES NOT DRINK
HOW OFTEN DO YOU HAVE A DRINK CONTAINING ALCOHOL: NEVER

## 2024-03-08 ASSESSMENT — ENCOUNTER SYMPTOMS
ABDOMINAL PAIN: 0
COUGH: 0
SHORTNESS OF BREATH: 0
VOMITING: 0
NAUSEA: 0

## 2024-03-08 ASSESSMENT — PAIN - FUNCTIONAL ASSESSMENT: PAIN_FUNCTIONAL_ASSESSMENT: NONE - DENIES PAIN

## 2024-03-08 NOTE — ED TRIAGE NOTES
Pt arrived to ED via EMS after a fall 3 days ago. Pt states they went to their PCP today and had an xray and CT scan that \"showed a neck fracture\". Pt denies pain. Pt on blood thinners and has two black eyes. Pt a/o x4.

## 2024-03-08 NOTE — ED PROVIDER NOTES
[facility] EMERGENCY DEPARTMENT  Emergency Department Encounter    Pt Name:Karime Booker  MRN: [id number]  Birthdate 1935  Date of evaluation: 3/8/24  PCP:  Maikel Farfan MD       CHIEF COMPLAINT       Chief Complaint   Patient presents with    Neck Injury     Pt fell 3 days ago. Xray today shower fracture per pt        HISTORY OF PRESENT ILLNESS     Karime Booker is a 88 y.o. female who presents with neck pain.  Patient fell 3 days ago, today went to the PCPs office for routine blood work results when she mentioned that she fell and her neck hurt.  She underwent an x-ray of her cervical spine that was positive for fracture.  She was then sent to the emergency department for evaluation.  Patient states that 3 days ago she was walking towards the barn when she slipped on mud falling \"flat face first\".  She did not lose consciousness.  Afterwards had a big \"goose egg \"on her right forehead and had a nosebleed.  Patient is anticoagulated on Plavix, no other AC or AP.    PAST MEDICAL / SURGICAL / SOCIAL / FAMILY HISTORY      has a past medical history of Age-related nuclear cataract of left eye, Age-related nuclear cataract of right eye, Anxiety, Coronary artery disease involving native coronary artery of native heart without angina pectoris, Essential tremor, H/O cardiac catheterization, H/O cardiovascular stress test, H/O echocardiogram, H/O exercise stress test, Heart murmur, Heart palpitations, Hyperlipidemia, MVP (mitral valve prolapse), Obesity, Palpitations, Tachycardia, and Uterine prolapse.     has a past surgical history that includes Cholecystectomy; Tonsillectomy; Dilation and curettage of uterus; Vaginal prolapse repair (11/04/2019); Cataract removal (Right, 02/11/2022); Cataract removal (Left, 03/08/2022); and Foot fracture surgery (Right).      Social History     Socioeconomic History    Marital status:      Spouse name: Not on file    Number of  immediate release tablet     Sig: Take 1 tablet by mouth every 6 hours as needed for Pain for up to 3 days. Intended supply: 3 days. Take lowest dose possible to manage pain Max Daily Amount: 20 mg     Dispense:  12 tablet     Refill:  0         PROCEDURES     Procedures    DIAGNOSTIC RESULTS / EMERGENCY DEPARTMENT COURSE / MDM     LAB RESULTS:  No results found for this visit on 03/08/24.      RADIOLOGY:  CT HEAD WO CONTRAST   Final Result      No acute intracranial hemorrhage or mass effect.      Electronically signed by Luis Braden MD      CT CERVICAL SPINE WO CONTRAST   Final Result   Addendum (preliminary) 1 of 1   ** ADDENDUM #1 **   ** ADDENDUM **   Result discussed with ER PA, Wilfrid Coe, at 6:59 PM on 3/8/2024      Electronically signed by Luis Braden MD      Final      1.  Type III dens fracture. Mild posterior angulation otherwise without significant displacement.   2.  No other cervical spine fracture identified. No subluxation.   3.  Severe multilevel degenerative changes. At least moderate canal stenosis at C5-C6. Multiple levels of severe foraminal stenosis.      Electronically signed by Luis Braden MD           EKG  EKG Interpretation by Me:     All EKG's are interpreted by the Emergency Department Physician who either signs or Co-signs this chart in the absence of a cardiologist.      EMERGENCY DEPARTMENT COURSE & MDM:      Medical Decision Making  Patient arrives here for evaluation of suspected neck fracture.  I attempted to see ED images and results from care everywhere but I am unable to see them therefore a repeat CT head and cervical spine was ordered.  CT head is unremarkable but CT cervical spine shows a type III dens fracture with no significant displacement.  I consulted with Dr. Parekh with orthopedic spine surgery who reviewed the images, no emergent surgical intervention planned.  May need surgery at a later date.  She is currently neurologically intact, not requiring any pain

## 2024-03-09 NOTE — DISCHARGE INSTRUCTIONS
You are seen here today for neck pain.  You have been diagnosed with type III dens fracture.  Please remain in the c-collar at all times, removing the c-collar puts you at high risk for permanent paralysis or even death.    I recommend 1000 mg of Tylenol every 8 hours for your pain.  If you need additional pain relief you may take half tablet to a full tablet of oxycodone.  Do not drive or operate heavy machinery on this medication.    Return to ED for worsening pain, numbness, tingling or weakness    Dr. Parekh will be able to see you next Tuesday at 9am please call to ensure you have an appointment.

## 2024-04-30 ENCOUNTER — HOSPITAL ENCOUNTER (OUTPATIENT)
Age: 89
Discharge: HOME | End: 2024-04-30
Payer: MEDICARE

## 2024-04-30 ENCOUNTER — HOSPITAL ENCOUNTER (OUTPATIENT)
Dept: GENERAL RADIOLOGY | Age: 89
Discharge: HOME | End: 2024-04-30
Payer: MEDICARE

## 2024-04-30 DIAGNOSIS — J44.0 OBSTRUCTIVE CHRONIC BRONCHITIS WITH ACUTE BRONCHITIS (HCC): ICD-10-CM

## 2024-04-30 DIAGNOSIS — J20.9 OBSTRUCTIVE CHRONIC BRONCHITIS WITH ACUTE BRONCHITIS (HCC): ICD-10-CM

## 2024-04-30 PROCEDURE — 71046 X-RAY EXAM CHEST 2 VIEWS: CPT

## 2024-05-25 LAB
CHOLESTEROL, TOTAL: 237 MG/DL
CHOLESTEROL/HDL RATIO: ABNORMAL
HDLC SERPL-MCNC: 79 MG/DL (ref 35–70)
LDL CHOLESTEROL: 123
NONHDLC SERPL-MCNC: ABNORMAL MG/DL
TRIGL SERPL-MCNC: 177 MG/DL
VLDLC SERPL CALC-MCNC: 35 MG/DL

## 2024-06-25 LAB
ALBUMIN: 4.3 G/DL
ALP BLD-CCNC: 79 U/L
ALT SERPL-CCNC: 11 U/L
ANION GAP SERPL CALCULATED.3IONS-SCNC: NORMAL MMOL/L
AST SERPL-CCNC: 17 U/L
BASOPHILS ABSOLUTE: 0 /ΜL
BASOPHILS RELATIVE PERCENT: 0.5 %
BILIRUB SERPL-MCNC: 0.4 MG/DL (ref 0.1–1.4)
BUN BLDV-MCNC: 17 MG/DL
CALCIUM SERPL-MCNC: 9.2 MG/DL
CHLORIDE BLD-SCNC: 103 MMOL/L
CO2: 28 MMOL/L
CREAT SERPL-MCNC: 0.7 MG/DL
EOSINOPHILS ABSOLUTE: 0.4 /ΜL
EOSINOPHILS RELATIVE PERCENT: 5.2 %
GFR, ESTIMATED: NORMAL
GLUCOSE BLD-MCNC: 92 MG/DL
HCT VFR BLD CALC: 42.6 % (ref 36–46)
HEMOGLOBIN: 14.5 G/DL (ref 12–16)
LYMPHOCYTES ABSOLUTE: 2.9 /ΜL
LYMPHOCYTES RELATIVE PERCENT: 38.9 %
MCH RBC QN AUTO: 30.6 PG
MCHC RBC AUTO-ENTMCNC: 34 G/DL
MCV RBC AUTO: 89.9 FL
MONOCYTES ABSOLUTE: 0.7 /ΜL
MONOCYTES RELATIVE PERCENT: 9.1 %
NEUTROPHILS ABSOLUTE: 3.4 /ΜL
NEUTROPHILS RELATIVE PERCENT: 46 %
PLATELET # BLD: 231 K/ΜL
PMV BLD AUTO: 9.7 FL
POTASSIUM SERPL-SCNC: 6.5 MMOL/L
RBC # BLD: 4.74 10^6/ΜL
SODIUM BLD-SCNC: 142 MMOL/L
TOTAL PROTEIN: 6.7 G/DL (ref 6.4–8.2)
TSH SERPL DL<=0.05 MIU/L-ACNC: 2.38 UIU/ML
WBC # BLD: 7.4 10^3/ML

## 2024-07-02 ENCOUNTER — OFFICE VISIT (OUTPATIENT)
Dept: CARDIOLOGY CLINIC | Age: 89
End: 2024-07-02
Payer: MEDICARE

## 2024-07-02 VITALS
BODY MASS INDEX: 29.37 KG/M2 | HEIGHT: 64 IN | SYSTOLIC BLOOD PRESSURE: 136 MMHG | DIASTOLIC BLOOD PRESSURE: 72 MMHG | WEIGHT: 172 LBS | HEART RATE: 80 BPM

## 2024-07-02 DIAGNOSIS — M48.02 SPINAL STENOSIS OF CERVICAL REGION: ICD-10-CM

## 2024-07-02 DIAGNOSIS — E78.00 PURE HYPERCHOLESTEROLEMIA: ICD-10-CM

## 2024-07-02 DIAGNOSIS — R53.83 OTHER FATIGUE: Primary | ICD-10-CM

## 2024-07-02 DIAGNOSIS — E87.5 HYPERKALEMIA: ICD-10-CM

## 2024-07-02 DIAGNOSIS — R25.1 TREMORS OF NERVOUS SYSTEM: ICD-10-CM

## 2024-07-02 DIAGNOSIS — Z91.81 AT HIGH RISK FOR INJURY RELATED TO FALL: ICD-10-CM

## 2024-07-02 DIAGNOSIS — I25.10 CORONARY ARTERY DISEASE INVOLVING NATIVE CORONARY ARTERY OF NATIVE HEART WITHOUT ANGINA PECTORIS: ICD-10-CM

## 2024-07-02 PROBLEM — I20.89 OTHER FORMS OF ANGINA PECTORIS (HCC): Status: ACTIVE | Noted: 2024-07-02

## 2024-07-02 PROCEDURE — 1090F PRES/ABSN URINE INCON ASSESS: CPT | Performed by: INTERNAL MEDICINE

## 2024-07-02 PROCEDURE — 93000 ELECTROCARDIOGRAM COMPLETE: CPT | Performed by: INTERNAL MEDICINE

## 2024-07-02 PROCEDURE — G8417 CALC BMI ABV UP PARAM F/U: HCPCS | Performed by: INTERNAL MEDICINE

## 2024-07-02 PROCEDURE — 99214 OFFICE O/P EST MOD 30 MIN: CPT | Performed by: INTERNAL MEDICINE

## 2024-07-02 PROCEDURE — 1123F ACP DISCUSS/DSCN MKR DOCD: CPT | Performed by: INTERNAL MEDICINE

## 2024-07-02 PROCEDURE — 1036F TOBACCO NON-USER: CPT | Performed by: INTERNAL MEDICINE

## 2024-07-02 PROCEDURE — G8427 DOCREV CUR MEDS BY ELIG CLIN: HCPCS | Performed by: INTERNAL MEDICINE

## 2024-07-02 NOTE — ASSESSMENT & PLAN NOTE
Patient had a fall in March and she is high risk to fall and she is counseled for maximum fall precautions.  Today she walked to the office without any cane or any walker.

## 2024-07-02 NOTE — ASSESSMENT & PLAN NOTE
She reports Inderal is not helping and enteral can also cause potassium to go high.  I have asked her to hold Inderal for now start taking 1 a day for 3 days and then stop.  Had BMP done in 2 weeks and follow-up.

## 2024-07-02 NOTE — PATIENT INSTRUCTIONS
Hold Inderal and repeat BMP in 2 weeks.. Patient is educated about being high risk for fall.  Maximum fall precautions counseled.  Questions answered.  Patient verbalized understanding.  Appropriate prescriptions if needed on this visit are addressed. After visit summery is provided.   Questions answered and patient verbalizes understanding. Follow up in 2 weeks,  sooner if needed.

## 2024-07-02 NOTE — ASSESSMENT & PLAN NOTE
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  She has seen neurosurgeons and they recommended conservative therapy.  Recommend physical therapy and maximum fall precautions and follow-up with PCP.  She is also due to see a neurologist.

## 2024-07-02 NOTE — ASSESSMENT & PLAN NOTE
Lipids from May 25, 2024 are reviewed and compared and her LDL was 123 and she is not on any statin therapy as she has history of intolerance to statins.

## 2024-07-02 NOTE — PROGRESS NOTES
Component 06/25/24 05/25/24 02/19/24   SEDIMENTATION RATE, WESTERGREN 25 47 High  21     Component 06/25/24 05/25/24   CREATINE KINASE, TOTAL 208 High  237 High    CK-BB None Detected None Detected   CK-MB 0 0   CK-MM 32 Low   25 Low       Component 06/25/24 05/25/24 02/19/24   SODIUM 142 135 143   POTASSIUM 6.5 High   4.0 4.5   CHLORIDE 103 100 105   CARBON DIOXIDE 28 28 27   GLUCOSE 92  96  91    BUN 17 14 17   CREATININE 0.7 0.7 0.7   BUN/CREAT RATIO 24 20 24   Estimated GFR 83 84 84   CALCIUM 9.2 9.0 9.2   TOTAL PROTEIN 6.7 6.4 6.4   ALBUMIN, SERUM 4.3 4.1 4.3   GLOBULIN 2.4 2.3 2.1   A/G RATIO 1.8 1.8 2.0   BILIRUBIN,TOTAL 0.4 0.7 0.5   AST(SGOT) 17 16 18   ALT(SGPT) 11 10 12   ALKALINE PHOSPHATASE 79 69 75     IMPRESSION:     1.  Type III dens fracture. Mild posterior angulation otherwise without significant displacement.  2.  No other cervical spine fracture identified. No subluxation.  3.  Severe multilevel degenerative changes. At least moderate canal stenosis at C5-C6. Multiple levels of severe foraminal stenosis.    IMPRESSION and RECOMMENDATIONS:      1. Other fatigue  -     EKG 12 lead; Future  -     Basic Metabolic Panel; Future  2. CAD, coronary ectasia by cath 2004  Assessment & Plan:  Clinically stable continue risk factor modification.    3. At high risk for injury related to fall  Assessment & Plan:  Patient had a fall in March and she is high risk to fall and she is counseled for maximum fall precautions.  Today she walked to the office without any cane or any walker.    4. Pure hypercholesterolemia  Assessment & Plan:  Lipids from May 25, 2024 are reviewed and compared and her LDL was 123 and she is not on any statin therapy as she has history of intolerance to statins.    5. Tremors of nervous system  Assessment & Plan:  She reports Inderal is not helping and enteral can also cause potassium to go high.  I have asked her to hold Inderal for now start taking 1 a day for 3 days and then stop.

## 2024-07-02 NOTE — ASSESSMENT & PLAN NOTE
A list of high potassium foods are discussed in detail to avoid and she is counseled to drink lots of fluids and have repeat BMP in 2 weeks.

## 2024-07-08 ENCOUNTER — TELEPHONE (OUTPATIENT)
Dept: CARDIOLOGY CLINIC | Age: 89
End: 2024-07-08

## 2024-07-08 NOTE — TELEPHONE ENCOUNTER
Patient called office states she has not been feeling well for the last week. Severe weakness and fatigue and when ever she does anything her heart rate goes up to greater than 110. She was on Inderal and it was stopped 7/2/24. She has an appointment on 7/15/24.

## 2024-07-09 RX ORDER — PROPRANOLOL HYDROCHLORIDE 10 MG/1
10 TABLET ORAL 2 TIMES DAILY
COMMUNITY
End: 2024-07-09 | Stop reason: SDUPTHER

## 2024-07-09 RX ORDER — PROPRANOLOL HYDROCHLORIDE 10 MG/1
10 TABLET ORAL 2 TIMES DAILY
Qty: 180 TABLET | Refills: 1 | Status: SHIPPED | OUTPATIENT
Start: 2024-07-09

## 2024-07-09 NOTE — TELEPHONE ENCOUNTER
Returned call to patient informed her to resume her inderal 10mg orally bid prescription sent to Dr. Amador for review.

## 2024-07-11 ENCOUNTER — HOSPITAL ENCOUNTER (OUTPATIENT)
Age: 89
Discharge: HOME | End: 2024-07-11
Payer: MEDICARE

## 2024-07-11 DIAGNOSIS — R53.83 OTHER FATIGUE: ICD-10-CM

## 2024-07-11 DIAGNOSIS — E87.5 HYPERKALEMIA: ICD-10-CM

## 2024-07-11 LAB
ANION GAP SERPL CALCULATED.3IONS-SCNC: 12 MMOL/L (ref 7–16)
BUN SERPL-MCNC: 17 MG/DL (ref 6–23)
CALCIUM SERPL-MCNC: 8.8 MG/DL (ref 8.3–10.6)
CHLORIDE BLD-SCNC: 102 MMOL/L (ref 99–110)
CO2: 26 MMOL/L (ref 21–32)
CREAT SERPL-MCNC: 0.6 MG/DL (ref 0.6–1.1)
GFR, ESTIMATED: 86 ML/MIN/1.73M2
GLUCOSE SERPL-MCNC: 107 MG/DL (ref 70–99)
POTASSIUM SERPL-SCNC: 4.2 MMOL/L (ref 3.5–5.1)
SODIUM BLD-SCNC: 140 MMOL/L (ref 135–145)

## 2024-07-11 PROCEDURE — 36415 COLL VENOUS BLD VENIPUNCTURE: CPT

## 2024-07-11 PROCEDURE — 80048 BASIC METABOLIC PNL TOTAL CA: CPT

## 2024-07-15 ENCOUNTER — OFFICE VISIT (OUTPATIENT)
Dept: CARDIOLOGY CLINIC | Age: 89
End: 2024-07-15
Payer: MEDICARE

## 2024-07-15 VITALS
DIASTOLIC BLOOD PRESSURE: 62 MMHG | BODY MASS INDEX: 29.19 KG/M2 | HEIGHT: 64 IN | WEIGHT: 171 LBS | SYSTOLIC BLOOD PRESSURE: 118 MMHG | HEART RATE: 70 BPM

## 2024-07-15 DIAGNOSIS — I25.10 CORONARY ARTERY DISEASE INVOLVING NATIVE CORONARY ARTERY OF NATIVE HEART WITHOUT ANGINA PECTORIS: ICD-10-CM

## 2024-07-15 DIAGNOSIS — E87.5 HYPERKALEMIA: ICD-10-CM

## 2024-07-15 DIAGNOSIS — R00.2 PALPITATIONS: Primary | ICD-10-CM

## 2024-07-15 PROCEDURE — G8417 CALC BMI ABV UP PARAM F/U: HCPCS | Performed by: INTERNAL MEDICINE

## 2024-07-15 PROCEDURE — 99213 OFFICE O/P EST LOW 20 MIN: CPT | Performed by: INTERNAL MEDICINE

## 2024-07-15 PROCEDURE — 1036F TOBACCO NON-USER: CPT | Performed by: INTERNAL MEDICINE

## 2024-07-15 PROCEDURE — G8427 DOCREV CUR MEDS BY ELIG CLIN: HCPCS | Performed by: INTERNAL MEDICINE

## 2024-07-15 PROCEDURE — 1123F ACP DISCUSS/DSCN MKR DOCD: CPT | Performed by: INTERNAL MEDICINE

## 2024-07-15 PROCEDURE — 1090F PRES/ABSN URINE INCON ASSESS: CPT | Performed by: INTERNAL MEDICINE

## 2024-07-15 NOTE — PROGRESS NOTES
Karime Booker  1935  Maikel Farfan MD      Chief Complaint   Patient presents with    Fatigue     OV for 2 week check for weakness, Pt denies any chest pain,SOB,dizziness, swelling to ankles. Some palpitations but not new.     Chief complaint and HPI:  Karime Booker  is a 89 y.o. female following up for known noncritical coronary artery disease and has palpitations and chronic tremors and excessive fatigue.  She had hyperkalemia last month had a BMP repeated and is here for follow-up.  When she stopped Inderal she started having more palpitations and felt worse and she resumed Inderal after she called us.  She is accompanied by her daughter.  Medications reviewed and reconciled.    She has appointment with Dr. Johnson gastroenterologist and she is reporting excessive bloating and passing gas from below and has alternating diarrhea and constipation and had a colonoscopy in 2018 by Dr. May.  She also an appointment with neurologist.    Past medical history:  has a past medical history of Age-related nuclear cataract of left eye, Age-related nuclear cataract of right eye, Anxiety, Coronary artery disease involving native coronary artery of native heart without angina pectoris, Essential tremor, H/O cardiac catheterization, H/O cardiovascular stress test, H/O echocardiogram, H/O exercise stress test, Heart murmur, Heart palpitations, Hyperlipidemia, MVP (mitral valve prolapse), Obesity, Palpitations, Tachycardia, and Uterine prolapse.  Past surgical history:  has a past surgical history that includes Cholecystectomy; Tonsillectomy; Dilation and curettage of uterus; Vaginal prolapse repair (11/04/2019); Cataract removal (Right, 02/11/2022); Cataract removal (Left, 03/08/2022); and Foot fracture surgery (Right).  Social History:   Social History     Tobacco Use    Smoking status: Never    Smokeless tobacco: Never   Substance Use Topics    Alcohol use: No     Comment: Caffeine: medium amount of chocolate daily

## 2024-07-15 NOTE — PATIENT INSTRUCTIONS
Continue current cardiovascular medications which have been reviewed and discussed individually with you.  See Dr Neurologist for further evaluation. Appropriate prescriptions if needed on this visit are addressed. After visit summery is provided.   Questions answered and patient verbalizes understanding. Follow up in 12 months,  sooner if needed.

## 2024-09-13 ENCOUNTER — OFFICE VISIT (OUTPATIENT)
Dept: NEUROLOGY | Age: 89
End: 2024-09-13
Payer: MEDICARE

## 2024-09-13 VITALS
SYSTOLIC BLOOD PRESSURE: 122 MMHG | WEIGHT: 171.6 LBS | DIASTOLIC BLOOD PRESSURE: 78 MMHG | OXYGEN SATURATION: 95 % | BODY MASS INDEX: 29.46 KG/M2 | HEART RATE: 74 BPM

## 2024-09-13 DIAGNOSIS — G25.0 ESSENTIAL TREMOR: Primary | ICD-10-CM

## 2024-09-13 PROCEDURE — 99205 OFFICE O/P NEW HI 60 MIN: CPT | Performed by: STUDENT IN AN ORGANIZED HEALTH CARE EDUCATION/TRAINING PROGRAM

## 2024-09-13 PROCEDURE — G8417 CALC BMI ABV UP PARAM F/U: HCPCS | Performed by: STUDENT IN AN ORGANIZED HEALTH CARE EDUCATION/TRAINING PROGRAM

## 2024-09-13 PROCEDURE — 1123F ACP DISCUSS/DSCN MKR DOCD: CPT | Performed by: STUDENT IN AN ORGANIZED HEALTH CARE EDUCATION/TRAINING PROGRAM

## 2024-09-13 PROCEDURE — G8427 DOCREV CUR MEDS BY ELIG CLIN: HCPCS | Performed by: STUDENT IN AN ORGANIZED HEALTH CARE EDUCATION/TRAINING PROGRAM

## 2024-09-13 PROCEDURE — 1036F TOBACCO NON-USER: CPT | Performed by: STUDENT IN AN ORGANIZED HEALTH CARE EDUCATION/TRAINING PROGRAM

## 2024-09-13 PROCEDURE — 1090F PRES/ABSN URINE INCON ASSESS: CPT | Performed by: STUDENT IN AN ORGANIZED HEALTH CARE EDUCATION/TRAINING PROGRAM

## 2024-09-13 RX ORDER — PROPRANOLOL HYDROCHLORIDE 10 MG/1
20 TABLET ORAL 2 TIMES DAILY
Qty: 120 TABLET | Refills: 5 | Status: SHIPPED | OUTPATIENT
Start: 2024-09-13

## 2024-10-30 ENCOUNTER — OFFICE VISIT (OUTPATIENT)
Dept: NEUROLOGY | Age: 89
End: 2024-10-30
Payer: MEDICARE

## 2024-10-30 VITALS
BODY MASS INDEX: 30.28 KG/M2 | HEART RATE: 71 BPM | DIASTOLIC BLOOD PRESSURE: 70 MMHG | WEIGHT: 176.4 LBS | OXYGEN SATURATION: 98 % | SYSTOLIC BLOOD PRESSURE: 110 MMHG

## 2024-10-30 DIAGNOSIS — G25.0 ESSENTIAL TREMOR: Primary | ICD-10-CM

## 2024-10-30 PROCEDURE — G8427 DOCREV CUR MEDS BY ELIG CLIN: HCPCS | Performed by: NURSE PRACTITIONER

## 2024-10-30 PROCEDURE — 1159F MED LIST DOCD IN RCRD: CPT | Performed by: NURSE PRACTITIONER

## 2024-10-30 PROCEDURE — 1123F ACP DISCUSS/DSCN MKR DOCD: CPT | Performed by: NURSE PRACTITIONER

## 2024-10-30 PROCEDURE — 1090F PRES/ABSN URINE INCON ASSESS: CPT | Performed by: NURSE PRACTITIONER

## 2024-10-30 PROCEDURE — 1036F TOBACCO NON-USER: CPT | Performed by: NURSE PRACTITIONER

## 2024-10-30 PROCEDURE — G8484 FLU IMMUNIZE NO ADMIN: HCPCS | Performed by: NURSE PRACTITIONER

## 2024-10-30 PROCEDURE — 99214 OFFICE O/P EST MOD 30 MIN: CPT | Performed by: NURSE PRACTITIONER

## 2024-10-30 PROCEDURE — G8417 CALC BMI ABV UP PARAM F/U: HCPCS | Performed by: NURSE PRACTITIONER

## 2024-10-30 RX ORDER — GABAPENTIN 100 MG/1
100 CAPSULE ORAL NIGHTLY
Qty: 30 CAPSULE | Refills: 5 | Status: SHIPPED | OUTPATIENT
Start: 2024-10-30 | End: 2025-04-30

## 2024-10-30 NOTE — PROGRESS NOTES
10/30/24    Karime Booker  1935    Chief Complaint   Patient presents with    Follow-up     Essential tremor       History of Present Illness  Karime is a 89 y.o. female presenting today for follow-up of: Essential tremor.  Last visit, she opted to increase her propranolol to 20 mg twice daily.  Another agent to consider is gabapentin.  For her tremor, she has tried propranolol 10 mg twice daily-she could not tolerate higher doses.  She has tried primidone 50 mg, Topamax 25 mg daily-could not tolerate higher dose.    Today, she tells me she is taking propranolol 20 mg twice daily.  She did not notice much improvement on the increased dose but if she forgets to take this her tremor gets worse.  She does feel more tired on this dose.    She notices her tremor when she is trying to write her name or bake in the kitchen.    Current Outpatient Medications   Medication Sig Dispense Refill    gabapentin (NEURONTIN) 100 MG capsule Take 1 capsule by mouth at bedtime for 182 days. 30 capsule 5    Multiple Vitamins-Minerals (CENTRUM SILVER 50+WOMEN PO) Take 1 each by mouth daily      propranolol (INDERAL) 10 MG tablet Take 2 tablets by mouth 2 times daily 120 tablet 5    clopidogrel (PLAVIX) 75 MG tablet Take 1 tablet by mouth daily 90 tablet 3    celecoxib (CELEBREX) 200 MG capsule Take 1 capsule by mouth every other day Pt taking every other day 45 capsule 3    nitroGLYCERIN (NITROSTAT) 0.3 MG SL tablet Place 1 tablet under the tongue every 5 minutes as needed for Chest pain (1-3 doses a day) 25 tablet 1    Multiple Vitamins-Minerals (PRESERVISION AREDS) CAPS Take 1 capsule by mouth 2 times daily      Cholecalciferol (VITAMIN D-3 PO) Take by mouth daily       acetaminophen (TYLENOL) 500 MG tablet Take 2 tablets by mouth 3 times daily as needed for Pain 42 tablet 0    Ascorbic Acid (VITAMIN C) 250 MG tablet Take 1 tablet by mouth daily (Patient not taking: Reported on 10/30/2024)      Omega-3 Fatty Acids (OMEGA-3 FISH

## 2024-11-21 ENCOUNTER — HOSPITAL ENCOUNTER (EMERGENCY)
Age: 89
Discharge: HOME | End: 2024-11-21
Attending: EMERGENCY MEDICINE
Payer: MEDICARE

## 2024-11-21 VITALS
RESPIRATION RATE: 18 BRPM | TEMPERATURE: 97.7 F | WEIGHT: 173 LBS | DIASTOLIC BLOOD PRESSURE: 74 MMHG | HEART RATE: 67 BPM | SYSTOLIC BLOOD PRESSURE: 138 MMHG | HEIGHT: 64 IN | BODY MASS INDEX: 29.53 KG/M2 | OXYGEN SATURATION: 95 %

## 2024-11-21 DIAGNOSIS — N30.00 ACUTE CYSTITIS WITHOUT HEMATURIA: Primary | ICD-10-CM

## 2024-11-21 LAB
BACTERIA URNS QL MICRO: ABNORMAL
BILIRUB UR QL STRIP: NEGATIVE
CLARITY UR: CLEAR
COLOR UR: YELLOW
EPI CELLS #/AREA URNS HPF: ABNORMAL /HPF
GLUCOSE UR STRIP-MCNC: NEGATIVE MG/DL
HGB UR QL STRIP.AUTO: NEGATIVE
KETONES UR STRIP-MCNC: NEGATIVE MG/DL
LEUKOCYTE ESTERASE UR QL STRIP: ABNORMAL
NITRITE UR QL STRIP: POSITIVE
PH UR STRIP: 6 [PH] (ref 5–8)
PROT UR STRIP-MCNC: NEGATIVE MG/DL
RBC #/AREA URNS HPF: ABNORMAL /HPF
SP GR UR STRIP: 1.01 (ref 1–1.03)
UROBILINOGEN UR STRIP-ACNC: 0.2 EU/DL (ref 0.2–1)
WBC #/AREA URNS HPF: ABNORMAL /HPF
YEAST URNS QL MICRO: PRESENT

## 2024-11-21 PROCEDURE — 6370000000 HC RX 637 (ALT 250 FOR IP)

## 2024-11-21 PROCEDURE — 99283 EMERGENCY DEPT VISIT LOW MDM: CPT

## 2024-11-21 PROCEDURE — 81001 URINALYSIS AUTO W/SCOPE: CPT

## 2024-11-21 RX ORDER — CEPHALEXIN 500 MG/1
500 CAPSULE ORAL 2 TIMES DAILY
Qty: 14 CAPSULE | Refills: 0 | Status: SHIPPED | OUTPATIENT
Start: 2024-11-21 | End: 2024-11-28

## 2024-11-21 RX ORDER — DIPHENHYDRAMINE HCL 25 MG
CAPSULE ORAL
Status: COMPLETED
Start: 2024-11-21 | End: 2024-11-21

## 2024-11-21 RX ORDER — DIPHENHYDRAMINE HCL 25 MG
25 CAPSULE ORAL EVERY 6 HOURS PRN
Status: DISCONTINUED | OUTPATIENT
Start: 2024-11-21 | End: 2024-11-21 | Stop reason: HOSPADM

## 2024-11-21 RX ADMIN — Medication 25 MG: at 03:46

## 2024-11-21 RX ADMIN — DIPHENHYDRAMINE HYDROCHLORIDE 25 MG: 25 CAPSULE ORAL at 03:46

## 2024-11-21 ASSESSMENT — PAIN - FUNCTIONAL ASSESSMENT: PAIN_FUNCTIONAL_ASSESSMENT: NONE - DENIES PAIN

## 2024-11-21 NOTE — DISCHARGE INSTR - COC
Continuity of Care Form    Patient Name: Karime Booker   :  1935  MRN:  [id number]    Admit date:  2024  Discharge date:  ***    Code Status Order: No Order   Advance Directives:   Advance Care Flowsheet Documentation             Admitting Physician:  No admitting provider for patient encounter.  PCP: Maikel Farfan MD    Discharging Nurse: ***  Discharging Hospital Unit/Room#:   Discharging Unit Phone Number: ***    Emergency Contact:   Extended Emergency Contact Information  Primary Emergency Contact: Geo Booker   [facility]  Home Phone: [phone]  Mobile Phone: [phone]  Relation: Child  Secondary Emergency Contact: Sintia Short  Home Phone: [phone]  Mobile Phone: [phone]  Relation: Child    Past Surgical History:  Past Surgical History:   Procedure Laterality Date    CATARACT REMOVAL Right 2022    RIGHT EYE CATARACT EXTRACTION WITH INTRAOCCULAR LENS IMPLANT performed by Jonathan Fairbanks MD at [facility] [place]    CATARACT REMOVAL Left 2022    LEFT EYE CATARACT EXTRACTION WITH INTRAOCULARL LENS IMPLANT performed by Jonathan Fairbanks MD at [facility] [place]    CHOLECYSTECTOMY      DILATION AND CURETTAGE OF UTERUS      FOOT FRACTURE SURGERY Right     TONSILLECTOMY      VAGINAL PROLAPSE REPAIR  2019       Immunization History:   Immunization History   Administered Date(s) Administered    Influenza Virus Vaccine 2003, 2009, 10/25/2010, 10/20/2011, 10/08/2012, 10/31/2014    Influenza, FLUARIX, FLULAVAL, FLUZONE (age 6 mo+) and AFLURIA, (age 3 y+), Quadv PF, 0.5mL 10/19/2015    Pneumococcal, PCV-13, PREVNAR 13, (age 6w+), IM, 0.5mL 2015    Pneumococcal, PPSV23, PNEUMOVAX 23, (age 2y+), SC/IM, 0.5mL 2003       Active Problems:  Patient Active Problem List   Diagnosis Code    Hyperlipidemia E78.5    Palpitations R00.2    Tremors of nervous system R25.1    CAD, coronary ectasia by cath  I25.10    Osteoarthritis (arthritis due to wear and tear of  joints) M19.90    Dyspnea on exertion R06.09    Enlarging skin lesion L98.9    Abnormal CT of the chest R93.89    BMI 28.0-28.9,adult Z68.28    Open wound of face S01.80XA    Right lower lobe pulmonary nodule R91.1    Overweight (BMI 25.0-29.9) E66.3    Other forms of angina pectoris (HCC) I20.89    At high risk for injury related to fall Z91.81    Hyperkalemia E87.5    Spinal stenosis of cervical region M48.02    Other fatigue R53.83       Isolation/Infection:   Isolation            No Isolation          Patient Infection Status       Infection Onset Added Last Indicated Last Indicated By Review Planned Expiration Resolved Resolved By    None active    Resolved    COVID-19 20 COVID-19   20 Infection                        Nurse Assessment:  Last Vital Signs: /74   Pulse 67   Temp 97.7 °F (36.5 °C) (Oral)   Resp 18   Ht 1.626 m (5' 4\")   Wt 78.5 kg (173 lb)   SpO2 95%   BMI 29.70 kg/m²     Last documented pain score (0-10 scale):    Last Weight:   Wt Readings from Last 1 Encounters:   24 78.5 kg (173 lb)     Mental Status:  {IP PT MENTAL STATUS:51156}    IV Access:  { THANIA IV ACCESS:407925630}    Nursing Mobility/ADLs:  Walking   {CHP DME ADLs:803956904}  Transfer  {CHP DME ADLs:806118697}  Bathing  {CHP DME ADLs:775971688}  Dressing  {CHP DME ADLs:578102557}  Toileting  {CHP DME ADLs:384759610}  Feeding  {CHP DME ADLs:016961706}  Med Admin  {CHP DME ADLs:081728733}  Med Delivery   { THANIA MED Delivery:657430950}    Wound Care Documentation and Therapy:  Incision 22 Eye Right (Active)   Number of days: 1013       Incision 22 Eye Left (Active)   Number of days: 988        Elimination:  Continence:   Bowel: {YES / NO:}  Bladder: {YES / NO:}  Urinary Catheter: {Urinary Catheter:513125003}   Colostomy/Ileostomy/Ileal Conduit: {YES / NO:}       Date of Last BM: ***  No intake or output data in the 24 hours ending 24 9908  No

## 2024-11-21 NOTE — ED NOTES
RN speaks with patient about obtaining urine sample prior to giving medication. Patient states this may take \"a while\" as she states she just went before she left the house. RN medicates patient and further explains that we don't need much, just enough to test. RN leaves patient to rest for a little bit and asks she let us know when she can give a small sample of urine.

## 2024-11-21 NOTE — ED PROVIDER NOTES
Triage Chief Complaint:   Leg Pain (Left leg itchiness and swelling for past several days to a week. Had a cortisone shot in same leg, a week ago Wednesday.) and Dysuria (C/o strong urine odor)    [place]:  Karime Booker is a 89 y.o. female that presents with complaint of right leg itching and concern for UTI.  Patient states that she has had intermittent itching to her legs before in the past but noticed that just over the past few hours when she got up to go to the bathroom.  She has not taken anything for the itching.  Denies medication changes or recent irritants.  No recent fevers.  Also states that for the last few days she has had foul-smelling urine and is concerned about UTI.  Denies any dysuria or hematuria.    ROS:  At least 6 systems reviewed and otherwise acutely negative except as in the [place].    Past Medical History:   Diagnosis Date    Age-related nuclear cataract of left eye 3/8/2022    Age-related nuclear cataract of right eye 2/11/2022    Anxiety     Coronary artery disease involving native coronary artery of native heart without angina pectoris 03/20/2018    Ectatic vessel with sluggish blood flow 2004 cath    Essential tremor     H/O cardiac catheterization 1993, 2004    Ectasia of cornaries, normal EF    H/O cardiovascular stress test 04/29/2014    Stress Cardiolite.  Normal perfusion in the distribution of all coronaries, normal LV size and function, EF 70%.    H/O echocardiogram 01/14/2021    EF 55-60%.  E/A reversal suggests abnormal LV relaxation. Mild MR.    H/O exercise stress test 05/1993    Heart murmur     Heart palpitations     Hyperlipidemia     MVP (mitral valve prolapse)     Obesity     Palpitations     PVCs and history of mitral valve prolapse    Tachycardia     Uterine prolapse 11/4/2019    Last Assessment & Plan:  Formatting of this note might be different from the original. Karime Booker is a 84 y.o. female with PMHx HLD, hx TIA, tremor, IBS, MVP, OA, low back pain, hip pain who

## 2024-12-18 ENCOUNTER — APPOINTMENT (OUTPATIENT)
Dept: CT IMAGING | Age: 89
End: 2024-12-18
Payer: MEDICARE

## 2024-12-18 ENCOUNTER — APPOINTMENT (OUTPATIENT)
Dept: GENERAL RADIOLOGY | Age: 89
End: 2024-12-18
Payer: MEDICARE

## 2024-12-18 ENCOUNTER — HOSPITAL ENCOUNTER (OUTPATIENT)
Age: 89
Setting detail: OBSERVATION
Discharge: HOME | End: 2024-12-19
Attending: STUDENT IN AN ORGANIZED HEALTH CARE EDUCATION/TRAINING PROGRAM | Admitting: INTERNAL MEDICINE
Payer: MEDICARE

## 2024-12-18 ENCOUNTER — APPOINTMENT (OUTPATIENT)
Dept: NON INVASIVE DIAGNOSTICS | Age: 89
End: 2024-12-18
Payer: MEDICARE

## 2024-12-18 DIAGNOSIS — R07.9 CHEST PAIN, UNSPECIFIED TYPE: Primary | ICD-10-CM

## 2024-12-18 DIAGNOSIS — R74.8 ELEVATED LIPASE: ICD-10-CM

## 2024-12-18 LAB
ALBUMIN SERPL-MCNC: 3.9 G/DL (ref 3.4–5)
ALBUMIN/GLOB SERPL: 2 {RATIO} (ref 1.1–2.2)
ALP SERPL-CCNC: 60 U/L (ref 40–129)
ALT SERPL-CCNC: 13 U/L (ref 10–40)
ANION GAP SERPL CALCULATED.3IONS-SCNC: 10 MMOL/L (ref 9–17)
AST SERPL-CCNC: 19 U/L (ref 15–37)
BASOPHILS # BLD: 0.05 K/UL
BASOPHILS NFR BLD: 1 % (ref 0–1)
BILIRUB DIRECT SERPL-MCNC: <0.2 MG/DL (ref 0–0.3)
BILIRUB INDIRECT SERPL-MCNC: ABNORMAL MG/DL (ref 0–0.7)
BILIRUB SERPL-MCNC: 0.3 MG/DL (ref 0–1)
BUN SERPL-MCNC: 17 MG/DL (ref 7–20)
CALCIUM SERPL-MCNC: 9.1 MG/DL (ref 8.3–10.6)
CHLORIDE SERPL-SCNC: 104 MMOL/L (ref 99–110)
CHOLEST SERPL-MCNC: 216 MG/DL (ref 125–199)
CO2 SERPL-SCNC: 25 MMOL/L (ref 21–32)
CREAT SERPL-MCNC: 0.6 MG/DL (ref 0.6–1.2)
D DIMER PPP FEU-MCNC: 0.74 UG/ML FEU (ref 0–0.46)
ECHO AO ASC DIAM: 3 CM
ECHO AO ASCENDING AORTA INDEX: 1.64 CM/M2
ECHO AV AREA PEAK VELOCITY: 3.1 CM2
ECHO AV AREA VTI: 3.1 CM2
ECHO AV AREA/BSA PEAK VELOCITY: 1.7 CM2/M2
ECHO AV AREA/BSA VTI: 1.7 CM2/M2
ECHO AV MEAN GRADIENT: 2 MMHG
ECHO AV MEAN VELOCITY: 0.7 M/S
ECHO AV PEAK GRADIENT: 5 MMHG
ECHO AV PEAK VELOCITY: 1.1 M/S
ECHO AV VELOCITY RATIO: 0.91
ECHO AV VTI: 22.3 CM
ECHO BSA: 1.88 M2
ECHO EST RA PRESSURE: 3 MMHG
ECHO LA AREA 4C: 13.3 CM2
ECHO LA MAJOR AXIS: 4.6 CM
ECHO LA VOL MOD A4C: 31 ML (ref 22–52)
ECHO LA VOLUME INDEX MOD A4C: 17 ML/M2 (ref 16–34)
ECHO LV E' LATERAL VELOCITY: 3.7 CM/S
ECHO LV E' SEPTAL VELOCITY: 5 CM/S
ECHO LV EF PHYSICIAN: 55 %
ECHO LV FRACTIONAL SHORTENING: 38 % (ref 28–44)
ECHO LV INTERNAL DIMENSION DIASTOLE INDEX: 2.3 CM/M2
ECHO LV INTERNAL DIMENSION DIASTOLIC: 4.2 CM (ref 3.9–5.3)
ECHO LV INTERNAL DIMENSION SYSTOLIC INDEX: 1.42 CM/M2
ECHO LV INTERNAL DIMENSION SYSTOLIC: 2.6 CM
ECHO LV IVSD: 1 CM (ref 0.6–0.9)
ECHO LV MASS 2D: 137.2 G (ref 67–162)
ECHO LV MASS INDEX 2D: 75 G/M2 (ref 43–95)
ECHO LV POSTERIOR WALL DIASTOLIC: 1 CM (ref 0.6–0.9)
ECHO LV RELATIVE WALL THICKNESS RATIO: 0.48
ECHO LVOT AREA: 3.5 CM2
ECHO LVOT AV VTI INDEX: 0.91
ECHO LVOT DIAM: 2.1 CM
ECHO LVOT MEAN GRADIENT: 2 MMHG
ECHO LVOT PEAK GRADIENT: 4 MMHG
ECHO LVOT PEAK VELOCITY: 1 M/S
ECHO LVOT STROKE VOLUME INDEX: 38.2 ML/M2
ECHO LVOT SV: 69.9 ML
ECHO LVOT VTI: 20.2 CM
ECHO MV A VELOCITY: 0.8 M/S
ECHO MV E DECELERATION TIME (DT): 201 MS
ECHO MV E VELOCITY: 0.47 M/S
ECHO MV E/A RATIO: 0.59
ECHO MV E/E' LATERAL: 12.7
ECHO MV E/E' RATIO (AVERAGED): 11.05
ECHO MV E/E' SEPTAL: 9.4
ECHO PULMONARY ARTERY END DIASTOLIC PRESSURE: 5 MMHG
ECHO PV REGURGITANT MAX VELOCITY: 1.1 M/S
ECHO RIGHT VENTRICULAR SYSTOLIC PRESSURE (RVSP): 32 MMHG
ECHO TV REGURGITANT MAX VELOCITY: 2.68 M/S
ECHO TV REGURGITANT PEAK GRADIENT: 29 MMHG
EKG ATRIAL RATE: 67 BPM
EKG DIAGNOSIS: NORMAL
EKG P AXIS: 13 DEGREES
EKG P-R INTERVAL: 154 MS
EKG Q-T INTERVAL: 422 MS
EKG QRS DURATION: 78 MS
EKG QTC CALCULATION (BAZETT): 445 MS
EKG R AXIS: -14 DEGREES
EKG T AXIS: 19 DEGREES
EKG VENTRICULAR RATE: 67 BPM
EOSINOPHIL # BLD: 0.27 K/UL
EOSINOPHILS RELATIVE PERCENT: 3 % (ref 0–3)
ERYTHROCYTE [DISTWIDTH] IN BLOOD BY AUTOMATED COUNT: 12.5 % (ref 11.7–14.9)
GFR, ESTIMATED: >90 ML/MIN/1.73M2
GLUCOSE SERPL-MCNC: 94 MG/DL (ref 74–99)
HCT VFR BLD AUTO: 40.9 % (ref 37–47)
HDLC SERPL-MCNC: 79 MG/DL
HGB BLD-MCNC: 13.8 G/DL (ref 12.5–16)
IMM GRANULOCYTES # BLD AUTO: 0.03 K/UL
IMM GRANULOCYTES NFR BLD: 0 %
LDLC SERPL CALC-MCNC: 100 MG/DL
LIPASE SERPL-CCNC: 80 U/L (ref 13–60)
LYMPHOCYTES NFR BLD: 2.33 K/UL
LYMPHOCYTES RELATIVE PERCENT: 29 % (ref 24–44)
MCH RBC QN AUTO: 31.2 PG (ref 27–31)
MCHC RBC AUTO-ENTMCNC: 33.7 G/DL (ref 32–36)
MCV RBC AUTO: 92.5 FL (ref 78–100)
MONOCYTES NFR BLD: 0.75 K/UL
MONOCYTES NFR BLD: 9 % (ref 0–4)
NEUTROPHILS NFR BLD: 57 % (ref 36–66)
NEUTS SEG NFR BLD: 4.56 K/UL
PLATELET # BLD AUTO: 222 K/UL (ref 140–440)
PMV BLD AUTO: 9.1 FL (ref 7.5–11.1)
POTASSIUM SERPL-SCNC: 3.8 MMOL/L (ref 3.5–5.1)
PROT SERPL-MCNC: 5.8 G/DL (ref 6.4–8.2)
RBC # BLD AUTO: 4.42 M/UL (ref 4.2–5.4)
SODIUM SERPL-SCNC: 139 MMOL/L (ref 136–145)
TRIGL SERPL-MCNC: 185 MG/DL
TROPONIN I SERPL HS-MCNC: 11 NG/L (ref 0–14)
TROPONIN I SERPL HS-MCNC: 12 NG/L (ref 0–14)
WBC OTHER # BLD: 8 K/UL (ref 4–10.5)

## 2024-12-18 PROCEDURE — 2500000003 HC RX 250 WO HCPCS: Performed by: NURSE PRACTITIONER

## 2024-12-18 PROCEDURE — 99285 EMERGENCY DEPT VISIT HI MDM: CPT

## 2024-12-18 PROCEDURE — 80061 LIPID PANEL: CPT

## 2024-12-18 PROCEDURE — G0378 HOSPITAL OBSERVATION PER HR: HCPCS

## 2024-12-18 PROCEDURE — 93010 ELECTROCARDIOGRAM REPORT: CPT | Performed by: INTERNAL MEDICINE

## 2024-12-18 PROCEDURE — 6370000000 HC RX 637 (ALT 250 FOR IP): Performed by: NURSE PRACTITIONER

## 2024-12-18 PROCEDURE — 71045 X-RAY EXAM CHEST 1 VIEW: CPT

## 2024-12-18 PROCEDURE — 93005 ELECTROCARDIOGRAM TRACING: CPT | Performed by: STUDENT IN AN ORGANIZED HEALTH CARE EDUCATION/TRAINING PROGRAM

## 2024-12-18 PROCEDURE — 93306 TTE W/DOPPLER COMPLETE: CPT | Performed by: INTERNAL MEDICINE

## 2024-12-18 PROCEDURE — 2500000003 HC RX 250 WO HCPCS: Performed by: STUDENT IN AN ORGANIZED HEALTH CARE EDUCATION/TRAINING PROGRAM

## 2024-12-18 PROCEDURE — 80053 COMPREHEN METABOLIC PANEL: CPT

## 2024-12-18 PROCEDURE — 85025 COMPLETE CBC W/AUTO DIFF WBC: CPT

## 2024-12-18 PROCEDURE — 84484 ASSAY OF TROPONIN QUANT: CPT

## 2024-12-18 PROCEDURE — 83690 ASSAY OF LIPASE: CPT

## 2024-12-18 PROCEDURE — 99223 1ST HOSP IP/OBS HIGH 75: CPT | Performed by: INTERNAL MEDICINE

## 2024-12-18 PROCEDURE — 82248 BILIRUBIN DIRECT: CPT

## 2024-12-18 PROCEDURE — 96374 THER/PROPH/DIAG INJ IV PUSH: CPT

## 2024-12-18 PROCEDURE — 74177 CT ABD & PELVIS W/CONTRAST: CPT

## 2024-12-18 PROCEDURE — 93306 TTE W/DOPPLER COMPLETE: CPT

## 2024-12-18 PROCEDURE — 36415 COLL VENOUS BLD VENIPUNCTURE: CPT

## 2024-12-18 PROCEDURE — 6360000002 HC RX W HCPCS: Performed by: NURSE PRACTITIONER

## 2024-12-18 PROCEDURE — 94761 N-INVAS EAR/PLS OXIMETRY MLT: CPT

## 2024-12-18 PROCEDURE — 6360000004 HC RX CONTRAST MEDICATION: Performed by: STUDENT IN AN ORGANIZED HEALTH CARE EDUCATION/TRAINING PROGRAM

## 2024-12-18 PROCEDURE — 85379 FIBRIN DEGRADATION QUANT: CPT

## 2024-12-18 RX ORDER — POLYETHYLENE GLYCOL 3350 17 G/17G
17 POWDER, FOR SOLUTION ORAL DAILY PRN
Status: DISCONTINUED | OUTPATIENT
Start: 2024-12-18 | End: 2024-12-19 | Stop reason: HOSPADM

## 2024-12-18 RX ORDER — CELECOXIB 200 MG/1
200 CAPSULE ORAL EVERY OTHER DAY
Status: DISCONTINUED | OUTPATIENT
Start: 2024-12-18 | End: 2024-12-19 | Stop reason: HOSPADM

## 2024-12-18 RX ORDER — PROPRANOLOL HYDROCHLORIDE 10 MG/1
20 TABLET ORAL 2 TIMES DAILY
Status: DISCONTINUED | OUTPATIENT
Start: 2024-12-18 | End: 2024-12-19 | Stop reason: HOSPADM

## 2024-12-18 RX ORDER — IOPAMIDOL 755 MG/ML
80 INJECTION, SOLUTION INTRAVASCULAR
Status: COMPLETED | OUTPATIENT
Start: 2024-12-18 | End: 2024-12-18

## 2024-12-18 RX ORDER — ENOXAPARIN SODIUM 100 MG/ML
40 INJECTION SUBCUTANEOUS DAILY
Status: DISCONTINUED | OUTPATIENT
Start: 2024-12-18 | End: 2024-12-19 | Stop reason: HOSPADM

## 2024-12-18 RX ORDER — SODIUM CHLORIDE 9 MG/ML
INJECTION, SOLUTION INTRAVENOUS PRN
Status: DISCONTINUED | OUTPATIENT
Start: 2024-12-18 | End: 2024-12-19 | Stop reason: HOSPADM

## 2024-12-18 RX ORDER — ONDANSETRON 4 MG/1
4 TABLET, ORALLY DISINTEGRATING ORAL EVERY 8 HOURS PRN
Status: DISCONTINUED | OUTPATIENT
Start: 2024-12-18 | End: 2024-12-19 | Stop reason: HOSPADM

## 2024-12-18 RX ORDER — POLYETHYLENE GLYCOL 3350 17 G
2 POWDER IN PACKET (EA) ORAL
Status: DISCONTINUED | OUTPATIENT
Start: 2024-12-18 | End: 2024-12-19 | Stop reason: HOSPADM

## 2024-12-18 RX ORDER — CLOPIDOGREL BISULFATE 75 MG/1
75 TABLET ORAL DAILY
Status: DISCONTINUED | OUTPATIENT
Start: 2024-12-18 | End: 2024-12-19 | Stop reason: HOSPADM

## 2024-12-18 RX ORDER — SODIUM CHLORIDE 0.9 % (FLUSH) 0.9 %
5-40 SYRINGE (ML) INJECTION EVERY 12 HOURS SCHEDULED
Status: DISCONTINUED | OUTPATIENT
Start: 2024-12-18 | End: 2024-12-19 | Stop reason: HOSPADM

## 2024-12-18 RX ORDER — ONDANSETRON 2 MG/ML
4 INJECTION INTRAMUSCULAR; INTRAVENOUS EVERY 6 HOURS PRN
Status: DISCONTINUED | OUTPATIENT
Start: 2024-12-18 | End: 2024-12-19 | Stop reason: HOSPADM

## 2024-12-18 RX ORDER — ACETAMINOPHEN 325 MG/1
650 TABLET ORAL EVERY 6 HOURS PRN
Status: DISCONTINUED | OUTPATIENT
Start: 2024-12-18 | End: 2024-12-19 | Stop reason: HOSPADM

## 2024-12-18 RX ORDER — ACETAMINOPHEN 650 MG/1
650 SUPPOSITORY RECTAL EVERY 6 HOURS PRN
Status: DISCONTINUED | OUTPATIENT
Start: 2024-12-18 | End: 2024-12-19 | Stop reason: HOSPADM

## 2024-12-18 RX ORDER — MAGNESIUM SULFATE IN WATER 40 MG/ML
2000 INJECTION, SOLUTION INTRAVENOUS PRN
Status: DISCONTINUED | OUTPATIENT
Start: 2024-12-18 | End: 2024-12-19 | Stop reason: HOSPADM

## 2024-12-18 RX ORDER — SODIUM CHLORIDE 0.9 % (FLUSH) 0.9 %
10 SYRINGE (ML) INJECTION PRN
Status: DISCONTINUED | OUTPATIENT
Start: 2024-12-18 | End: 2024-12-19 | Stop reason: HOSPADM

## 2024-12-18 RX ORDER — POTASSIUM CHLORIDE 1500 MG/1
40 TABLET, EXTENDED RELEASE ORAL PRN
Status: DISCONTINUED | OUTPATIENT
Start: 2024-12-18 | End: 2024-12-19 | Stop reason: HOSPADM

## 2024-12-18 RX ORDER — FAMOTIDINE 10 MG/ML
20 INJECTION, SOLUTION INTRAVENOUS ONCE
Status: COMPLETED | OUTPATIENT
Start: 2024-12-18 | End: 2024-12-18

## 2024-12-18 RX ORDER — POTASSIUM CHLORIDE 7.45 MG/ML
10 INJECTION INTRAVENOUS PRN
Status: DISCONTINUED | OUTPATIENT
Start: 2024-12-18 | End: 2024-12-19 | Stop reason: HOSPADM

## 2024-12-18 RX ADMIN — SODIUM CHLORIDE, PRESERVATIVE FREE 10 ML: 5 INJECTION INTRAVENOUS at 20:46

## 2024-12-18 RX ADMIN — CELECOXIB 200 MG: 200 CAPSULE ORAL at 15:24

## 2024-12-18 RX ADMIN — SODIUM CHLORIDE, PRESERVATIVE FREE 10 ML: 5 INJECTION INTRAVENOUS at 15:26

## 2024-12-18 RX ADMIN — PROPRANOLOL HYDROCHLORIDE 20 MG: 10 TABLET ORAL at 20:46

## 2024-12-18 RX ADMIN — CLOPIDOGREL BISULFATE 75 MG: 75 TABLET ORAL at 15:24

## 2024-12-18 RX ADMIN — ENOXAPARIN SODIUM 40 MG: 100 INJECTION SUBCUTANEOUS at 15:25

## 2024-12-18 RX ADMIN — PROPRANOLOL HYDROCHLORIDE 20 MG: 10 TABLET ORAL at 15:24

## 2024-12-18 RX ADMIN — FAMOTIDINE 20 MG: 10 INJECTION, SOLUTION INTRAVENOUS at 06:58

## 2024-12-18 RX ADMIN — ACETAMINOPHEN 650 MG: 325 TABLET ORAL at 15:24

## 2024-12-18 RX ADMIN — IOPAMIDOL 80 ML: 755 INJECTION, SOLUTION INTRAVENOUS at 08:25

## 2024-12-18 ASSESSMENT — PAIN SCALES - GENERAL
PAINLEVEL_OUTOF10: 7
PAINLEVEL_OUTOF10: 4
PAINLEVEL_OUTOF10: 5

## 2024-12-18 ASSESSMENT — PAIN - FUNCTIONAL ASSESSMENT
PAIN_FUNCTIONAL_ASSESSMENT: ACTIVITIES ARE NOT PREVENTED
PAIN_FUNCTIONAL_ASSESSMENT: 0-10

## 2024-12-18 ASSESSMENT — PAIN DESCRIPTION - LOCATION
LOCATION: CHEST
LOCATION: NECK
LOCATION: NECK

## 2024-12-18 ASSESSMENT — PAIN DESCRIPTION - DESCRIPTORS: DESCRIPTORS: PRESSURE

## 2024-12-18 NOTE — CARE COORDINATION
12/18/24 1149   Service Assessment   Patient Orientation Alert and Oriented;Person;Place;Situation   Cognition Alert   History Provided By Patient   Primary Caregiver Self   Support Systems Children;Family Members;Friends/Neighbors   Patient's Healthcare Decision Maker is: Legal Next of Kin   PCP Verified by CM Yes   Last Visit to PCP Within last 6 months   Prior Functional Level Independent in ADLs/IADLs   Current Functional Level Assistance with the following:;Mobility   Can patient return to prior living arrangement Yes   Ability to make needs known: Good   Family able to assist with home care needs: Yes   Would you like for me to discuss the discharge plan with any other family members/significant others, and if so, who? No   Condition of Participation: Discharge Planning   The Patient and/or Patient Representative was provided with a Choice of Provider? Patient   The Patient and/Or Patient Representative agree with the Discharge Plan? Yes     CM into see pt to initiate a safe discharge plan. Cm introduced self and explained role of CM. Pt is kind, alert and oriented. Pt lives alone in a two story home but pt is able to stay on the first floor. Pt is independent for all ADL's. Pt is well supported by 3 adult children and 2 are local. Pt is able to shower with grab bars. DME includes a walker and cane. Pt has a PCP and insurance. Pt is able to drive and obtains her groceries/ makes appointments and obtain meds. Pt declined any needs when discharged. Declined home care.   CM provided card and encouraged to call for any needs or concern.   CM is available if any needs arise.

## 2024-12-18 NOTE — CONSULTS
INPATIENT CARDIOLOGY CONSULT NOTE       Reason for consultation:  Chest Pain     Referring physician:  Chandler Humphreys MD     Primary care physician: Maikel Farfan MD      Dear Chandler Humphreys MD Thank you for the consult    Chief Complaint   Patient presents with    Chest Pain     Patient was awoken by mid sternal stabbing chest pain that as since subsided. Patient reports slight pressure as a 4/10. Was given 324mg aspirin on arrival.        History of present illness:Karime is a 89 y.o.year old who  presents with  Chief Complaint   Patient presents with    Chest Pain     Patient was awoken by mid sternal stabbing chest pain that as since subsided. Patient reports slight pressure as a 4/10. Was given 324mg aspirin on arrival.      Patient is a pleasant 89-year-old female who presents to the hospital with chief complaint of chest pain which woke her up from sleep.  Patient mention chest pain was retrosternal rated as 8 out of 10, excruciating associate with mild dyspnea.  Denies any prior established history of CAD.    EKG shows sinus rhythm without any ischemia  Cardiac troponin are negative  Echocardiogram today shows preserved LV ejection fraction with mild aortic valve sclerosis but       Left Ventricle: The EF by visual approximation is 55 - 60%. Increased wall thickness. Normal wall motion. Abnormal diastolic function.    Aortic Valve: Sclerosis of the aortic valve cusps.    Tricuspid Valve: Mild regurgitation. The estimated RVSP is 32 mmHg.    Pulmonic Valve: Trace regurgitation.    Pericardium: No pericardial effusion.    Image quality is adequate.       Past medical history:    has a past medical history of Age-related nuclear cataract of left eye, Age-related nuclear cataract of right eye, Anxiety, Coronary artery disease involving native coronary artery of native heart without angina pectoris, Essential tremor, H/O cardiac catheterization, H/O cardiovascular stress test, H/O echocardiogram, H/O

## 2024-12-18 NOTE — ED NOTES
ED TO INPATIENT SBAR HANDOFF    Patient Name: Karime Booker   :  1935  89 y.o.   Preferred Name  Karime   Family/Caregiver Present no   Restraints no   C-SSRS: Risk of Suicide: No Risk  Sitter no   Sepsis Risk Score        Situation  Chief Complaint   Patient presents with    Chest Pain     Patient was awoken by mid sternal stabbing chest pain that as since subsided. Patient reports slight pressure as a 4/10. Was given 324mg aspirin on arrival.      Brief Description of Patient's Condition: Pt states that she went to sleep and felt fine and woke up reporting that she had left sided chest pain.   Mental Status: oriented, alert, and thought processes intact  Arrived from: home    Imaging:   CT ABDOMEN PELVIS W IV CONTRAST Additional Contrast? None   Final Result      1. No acute process, specifically no CT evidence of acute pancreatitis.   2. Unchanged mild right hydronephrosis likely related to chronic UPJ   obstruction.   3. Small hiatal hernia.   4. Additional findings as above.      Electronically signed by Herrera Vines      XR CHEST PORTABLE   Final Result   No plain film evidence for acute cardiopulmonary disease.             Electronically signed by Rach Holguin        Abnormal labs:   Abnormal Labs Reviewed   CBC WITH AUTO DIFFERENTIAL - Abnormal; Notable for the following components:       Result Value    MCH 31.2 (*)     Monocytes % 9 (*)     All other components within normal limits   HEPATIC FUNCTION PANEL - Abnormal; Notable for the following components:    Total Protein 5.8 (*)     All other components within normal limits   LIPASE - Abnormal; Notable for the following components:    Lipase 80 (*)     All other components within normal limits   D-DIMER, QUANTITATIVE - Abnormal; Notable for the following components:    D-Dimer, Quant 0.74 (*)     All other components within normal limits        Background  History:   Past Medical History:   Diagnosis Date    Age-related nuclear cataract of

## 2024-12-18 NOTE — ED PROVIDER NOTES
cause of her chest pain is unclear, consider early pancreatitis, consider underlying gastritis or dyspepsia.  However given her age and risk factors, she does have an elevated heart score of 5.  I do not see any recent cardiac testing.  Because of this I do believe she warrants hospitalization for ACS rule out.  She was provided with aspirin prior to arrival.  Her symptoms had improved upon time of arrival.  Message sent to the hospitalist was agreeable      ED Course as of 12/18/24 0929   Wed Dec 18, 2024   0642 Age-adjusted D-dimer normal. [CR]      ED Course User Index  [CR] Sohail Marlow,            EKG (if obtained): (All EKG's are interpreted by myself in the absence of a cardiologist) ventricular rate 67 bpm, NH interval 154 ms, QRS duration 78 ms, QT/QTc 422/445 ms.  Normal sinus rhythm.  T wave inversions in lead III as well as V1, V2, V3, V4.  No STEMI criteria.        Physical Exam:   Patient Vitals for the past 24 hrs:   BP Temp Temp src Pulse Resp SpO2 Weight   12/18/24 0715 -- -- -- 64 14 96 % --   12/18/24 0700 136/78 -- -- 69 20 97 % --   12/18/24 0645 -- -- -- 64 14 95 % --   12/18/24 0630 133/75 -- -- 68 13 96 % --   12/18/24 0615 -- -- -- 69 15 97 % --   12/18/24 0600 135/78 -- -- 72 21 95 % --   12/18/24 0551 (!) 141/80 -- -- 71 18 96 % 78 kg (172 lb)   12/18/24 0549 (!) 141/80 97.6 °F (36.4 °C) Oral 73 16 96 % --       Physical Exam  Vitals reviewed.   HENT:      Head: Normocephalic and atraumatic.      Right Ear: External ear normal.      Left Ear: External ear normal.   Eyes:      General: Lids are normal.   Cardiovascular:      Rate and Rhythm: Normal rate and regular rhythm.      Pulses:           Radial pulses are 2+ on the right side and 2+ on the left side.        Dorsalis pedis pulses are 2+ on the right side and 2+ on the left side.      Heart sounds: Normal heart sounds.   Pulmonary:      Effort: No accessory muscle usage or respiratory distress.      Comments: Clear to auscultation

## 2024-12-18 NOTE — PROGRESS NOTES
4 Eyes Skin Assessment     NAME:  Karime Booker  YOB: 1935  MEDICAL RECORD NUMBER:  [id number]    The patient is being assessed for  Admission    I agree that at least one RN has performed a thorough Head to Toe Skin Assessment on the patient. ALL assessment sites listed below have been assessed.      Areas assessed by both nurses:    Head, Face, Ears, Shoulders, Back, Chest, Arms, Elbows, Hands, Sacrum. Buttock, Coccyx, Ischium, Legs. Feet and Heels, and Under Medical Devices         Does the Patient have a Wound? No noted wound(s)       Dwayne Prevention initiated by RN: No  Wound Care Orders initiated by RN: No    Pressure Injury (Stage 3,4, Unstageable, DTI, NWPT, and Complex wounds) if present, place Wound referral order by RN under : No    New Ostomies, if present place, Ostomy referral order under : No     Nurse 1 eSignature: Electronically signed by Julia Francois RN on 12/18/24 at 1:02 PM EST    **SHARE this note so that the co-signing nurse can place an eSignature**    Nurse 2 eSignature: Electronically signed by Nicky Montoya LPN on 12/18/24 at 1:03 PM EST

## 2024-12-18 NOTE — H&P
V2.0  History and Physical      Name:  Karime Booker /Age/Sex: 1935  (89 y.o. female)   MRN & CSN:  [id number] & [id number] Encounter Date/Time: 2024 9:43 AM EST   Location:  [facility]  PCP: Maikel Farfan MD       Hospital Day: 1    Assessment and Plan:   Karime Booker is a 89 y.o. female with a pmh of hypertension, CAD coronary ectasia by cath  by cath who presents with Chest pain    Hospital Problems             Last Modified POA    * (Principal) Chest pain 2024 Yes     Chest pain  History of CAD, no stent  -Woke up with chest aching in left side, radiated to back this morning  -EKG shows nonspecific T wave change  -Troponin negative x 2  -Place patient to observation unit with telemetry  -Cardiology consult, appreciate their recommendation  -Update echocardiogram  -Continue aspirin, Plavix     Hypertension  -Continue propranolol 20 Mg twice daily            Disposition:   Current Living situation: Home  Expected Disposition: Home  Estimated D/C: 1 to 2 days    Diet Diet NPO   DVT Prophylaxis [x] Lovenox, []  Heparin, [] SCDs, [] Ambulation,  [] Eliquis, [] Xarelto   Code Status Full Code   Surrogate Decision Maker/ POA      History from:     patient    History of Present Illness:     Chief Complaint: Chest pain  Karime Booker is a 89 y.o. female with pmh of hypertension, CAD who presents with chest pain.  Patient woke up this morning with constant aching in left-sided chest and radiated to back.  Patient stated the pain resolved when she arriving at ED.  Then she had short sharp pain twice later.  No diaphoresis.  No shortness of breath.  Patient's EKG showed nonspecific T wave change.  Troponin negative x 2.  Other labs unremarkable.  Patient stated she had a cardiac cath in .  No stent was inserted.  However cardiology recommended Plavix and aspirin treatment.  Patient is not on statins.     Review of Systems: Need 10 Elements   Review of Systems   All other systems

## 2024-12-19 ENCOUNTER — APPOINTMENT (OUTPATIENT)
Dept: NUCLEAR MEDICINE | Age: 89
End: 2024-12-19
Payer: MEDICARE

## 2024-12-19 ENCOUNTER — HOSPITAL ENCOUNTER (OUTPATIENT)
Dept: NON INVASIVE DIAGNOSTICS | Age: 89
Setting detail: OBSERVATION
Discharge: HOME | End: 2024-12-21
Payer: MEDICARE

## 2024-12-19 VITALS
BODY MASS INDEX: 29.37 KG/M2 | SYSTOLIC BLOOD PRESSURE: 125 MMHG | DIASTOLIC BLOOD PRESSURE: 93 MMHG | TEMPERATURE: 97.9 F | OXYGEN SATURATION: 95 % | HEIGHT: 64 IN | RESPIRATION RATE: 18 BRPM | WEIGHT: 172 LBS | HEART RATE: 92 BPM

## 2024-12-19 LAB
ECHO BSA: 1.88 M2
STRESS BASELINE DIAS BP: 69 MMHG
STRESS BASELINE HR: 69 BPM
STRESS BASELINE SYS BP: 134 MMHG
STRESS ESTIMATED WORKLOAD: 1 METS
STRESS PEAK DIAS BP: 69 MMHG
STRESS PEAK SYS BP: 134 MMHG
STRESS PERCENT HR ACHIEVED: 77 %
STRESS POST PEAK HR: 102 BPM
STRESS RATE PRESSURE PRODUCT: NORMAL BPM*MMHG
STRESS ST DEPRESSION: 0 MM
STRESS TARGET HR: 132 BPM

## 2024-12-19 PROCEDURE — 3430000000 HC RX DIAGNOSTIC RADIOPHARMACEUTICAL: Performed by: INTERNAL MEDICINE

## 2024-12-19 PROCEDURE — 93017 CV STRESS TEST TRACING ONLY: CPT

## 2024-12-19 PROCEDURE — G0378 HOSPITAL OBSERVATION PER HR: HCPCS

## 2024-12-19 PROCEDURE — 6360000002 HC RX W HCPCS: Performed by: INTERNAL MEDICINE

## 2024-12-19 PROCEDURE — A9500 TC99M SESTAMIBI: HCPCS | Performed by: INTERNAL MEDICINE

## 2024-12-19 PROCEDURE — 6370000000 HC RX 637 (ALT 250 FOR IP): Performed by: NURSE PRACTITIONER

## 2024-12-19 PROCEDURE — APPNB15 APP NON BILLABLE TIME 0-15 MINS

## 2024-12-19 PROCEDURE — 78452 HT MUSCLE IMAGE SPECT MULT: CPT

## 2024-12-19 RX ORDER — TETRAKIS(2-METHOXYISOBUTYLISOCYANIDE)COPPER(I) TETRAFLUOROBORATE 1 MG/ML
30.4 INJECTION, POWDER, LYOPHILIZED, FOR SOLUTION INTRAVENOUS
Status: COMPLETED | OUTPATIENT
Start: 2024-12-19 | End: 2024-12-19

## 2024-12-19 RX ORDER — AMINOPHYLLINE 25 MG/ML
75 INJECTION, SOLUTION INTRAVENOUS ONCE
Status: COMPLETED | OUTPATIENT
Start: 2024-12-19 | End: 2024-12-19

## 2024-12-19 RX ORDER — TETRAKIS(2-METHOXYISOBUTYLISOCYANIDE)COPPER(I) TETRAFLUOROBORATE 1 MG/ML
11 INJECTION, POWDER, LYOPHILIZED, FOR SOLUTION INTRAVENOUS
Status: COMPLETED | OUTPATIENT
Start: 2024-12-19 | End: 2024-12-19

## 2024-12-19 RX ORDER — REGADENOSON 0.08 MG/ML
0.4 INJECTION, SOLUTION INTRAVENOUS
Status: COMPLETED | OUTPATIENT
Start: 2024-12-19 | End: 2024-12-19

## 2024-12-19 RX ADMIN — CLOPIDOGREL BISULFATE 75 MG: 75 TABLET ORAL at 14:09

## 2024-12-19 RX ADMIN — PROPRANOLOL HYDROCHLORIDE 20 MG: 10 TABLET ORAL at 14:09

## 2024-12-19 RX ADMIN — AMINOPHYLLINE 75 MG: 25 INJECTION, SOLUTION INTRAVENOUS at 11:19

## 2024-12-19 RX ADMIN — REGADENOSON 0.4 MG: 0.08 INJECTION, SOLUTION INTRAVENOUS at 11:01

## 2024-12-19 RX ADMIN — KIT FOR THE PREPARATION OF TECHNETIUM TC99M SESTAMIBI 30.4 MILLICURIE: 1 INJECTION, POWDER, LYOPHILIZED, FOR SOLUTION PARENTERAL at 12:30

## 2024-12-19 RX ADMIN — KIT FOR THE PREPARATION OF TECHNETIUM TC99M SESTAMIBI 11 MILLICURIE: 1 INJECTION, POWDER, LYOPHILIZED, FOR SOLUTION PARENTERAL at 12:30

## 2024-12-19 NOTE — PROGRESS NOTES
This nurse went over discharge instructions and removed PIV with no complications. Patient waiting on ride and will be discharged home.

## 2024-12-19 NOTE — DISCHARGE SUMMARY
V2.0  Discharge Summary    Name:  Karime Booker /Age/Sex: 1935 (89 y.o. female)   Admit Date: 2024  Discharge Date: 24    MRN & CSN:  [id number] & [id number] Encounter Date and Time 24 2:02 PM EST    Attending:  Chandler Humphreys MD Discharging Provider: HAZEL Zazueta - CNP       Hospital Course:     Brief HPI: Karime Booker is a 89 y.o. female with a past medical history of hypertension, CAD coronary ectasia by cath  who presented with chest pain.  Patient's EKG shows nonspecific T wave change.  Troponin negative x 2.  Cardiology evaluated the patient and ordered stress test this morning.  The stress test is negative for ischemic change.  Cardiology signed off.  Patient will continue home medications.  Continue aspirin and Plavix.  Follow-up with cardiology and PCP.    Brief Problem Based Course:   Chest pain  History of CAD, no stent  -Woke up with chest aching in left side, radiated to back this morning  -EKG shows nonspecific T wave change  -Troponin negative x 2  -Cardiology evaluated the patient and ordered stress test, stress test is negative, okay for discharge  -Echocardiogram shows stenosis of aortic valve cusps.  LVEF of 55 to 60%  -Continue aspirin, Plavix      Hypertension  -Continue propranolol 20 Mg twice daily         The patient expressed appropriate understanding of, and agreement with the discharge recommendations, medications, and plan.     Consults this admission:  IP CONSULT TO CARDIOLOGY    Discharge Diagnosis:   Chest pain  History of CAD  Hypertension    Discharge Instruction:   Follow up appointments: Cardiology and PCP  Primary care physician: Maikel Farfan MD within 2 weeks  Diet: regular diet   Activity: activity as tolerated  Disposition: Discharged to:   [x]Home, []HHC, []SNF, []Acute Rehab, []Other Acute Care Facility, []Hospice   Condition on discharge: Stable  Labs and Tests to be Followed up as an outpatient by PCP or Specialist:     Discharge  Consent was obtained, the patient was placed in supine Trendelenburg position and the area was prepped and draped in a sterile fashion. Anesthesia was achieved with 1% lidocaine and 1 mg of Ativan for sedation. The right internal jugular vein was assessed under ultrasound guidance using a finder needle. Dark, non-pulsatile venous blood was identified and withdrawn.  Using the Seldinger technique, a guidewire was advanced into the vein and the needle was withdrawn. A small incision was made with a 10 blade scalpel and a dilator was advanced over the guidewire until appropriate dilation was obtained. The dilator was removed and an 7 [place] central venous triple-lumen catheter was advanced over the guidewire and secured into place with sutures at 16 cm. At time of procedure completion, all ports aspirated and flushed properly. Post-procedure X-ray shows the tip of the catheter within the superior vena cava.     Javi Cloud DO PGY-2

## 2024-12-19 NOTE — PROGRESS NOTES
[place] [address]  Phone: [phone]    Fax [phone]                  Dorothea Hall MD, [facility]       Travis Leon MD, [facility]  Edward Amador MD, [facility]    MD Lebron Gaytan MD Tariq Rizvi, MD Bilal Alam, MD Dr. Waseem Sajjad MD Melissa Kellis, APRGENEVIEVE Bonilla, APRGENEVIEVE Fatima, APRGENEVIEVE Vasquez, APRN  Carlton Norwood PA-C    CARDIOLOGY  NOTE      Name:  Karime Booker /Age/Sex: 1935  (89 y.o. female)   MRN & CSN:  [id number] & [id number] Admission Date/Time: 2024  5:47 AM   Location:  [facility] [id number] PCP: Maikel Farfan MD       Hospital Day: 2    - Cardiology consult is for:  chest pain      ASSESSMENT/ PLAN:  Atypical chest pain with history of nonobstructive CAD  GERD    -No chest pain today  -Echocardiogram unremarkable.  -Stress test today negative for ischemia.  -Chest comfort alleviated with Tums.  -Patient states that she is not interested in taking medicines for GERD  -Plavix    Essential hypertension  -Propranolol 20 mg twice daily    Cardiology will sign off, please call with any questions.  Patient to follow-up in clinic    Subjective:  Karime is a 89 y.o.year old     No pain today.    Discussed care with primary team.    Informed patient likely she experiencing acid reflux.  Specially since her symptoms are alleviated with Tums and are not exertional.    Updated her on stress test results.    Follows with Dr. Amador      Objective: Temperature:  Current - Temp: 97.9 °F (36.6 °C); Max - Temp  Av.9 °F (36.6 °C)  Min: 97.6 °F (36.4 °C)  Max: 98.2 °F (36.8 °C)    Respiratory Rate : Resp  Av.3  Min: 16  Max: 18    Pulse Range: Pulse  Av.5  Min: 70  Max: 97    Blood Presuure Range:  Systolic (24hrs), Av , Min:106 , Max:125   ; Diastolic (24hrs), Av, Min:56, Max:93      Pulse ox Range: SpO2  Av %  Min: 95 %  Max: 95

## 2025-01-13 ENCOUNTER — HOSPITAL ENCOUNTER (OUTPATIENT)
Dept: CT IMAGING | Age: 89
Discharge: HOME | End: 2025-01-13
Attending: INTERNAL MEDICINE
Payer: MEDICARE

## 2025-01-13 DIAGNOSIS — R91.1 RIGHT LOWER LOBE PULMONARY NODULE: ICD-10-CM

## 2025-01-13 PROCEDURE — 71250 CT THORAX DX C-: CPT

## 2025-01-21 ENCOUNTER — OFFICE VISIT (OUTPATIENT)
Dept: CARDIOLOGY CLINIC | Age: 89
End: 2025-01-21
Payer: MEDICARE

## 2025-01-21 VITALS
HEIGHT: 64 IN | HEART RATE: 68 BPM | RESPIRATION RATE: 18 BRPM | SYSTOLIC BLOOD PRESSURE: 112 MMHG | DIASTOLIC BLOOD PRESSURE: 72 MMHG | WEIGHT: 177.2 LBS | BODY MASS INDEX: 30.25 KG/M2

## 2025-01-21 DIAGNOSIS — I25.10 CORONARY ARTERY DISEASE INVOLVING NATIVE CORONARY ARTERY OF NATIVE HEART WITHOUT ANGINA PECTORIS: Primary | ICD-10-CM

## 2025-01-21 DIAGNOSIS — E78.00 PURE HYPERCHOLESTEROLEMIA: ICD-10-CM

## 2025-01-21 DIAGNOSIS — Z91.81 AT HIGH RISK FOR INJURY RELATED TO FALL: ICD-10-CM

## 2025-01-21 DIAGNOSIS — M48.02 SPINAL STENOSIS OF CERVICAL REGION: ICD-10-CM

## 2025-01-21 PROBLEM — R07.9 CHEST PAIN: Status: RESOLVED | Noted: 2024-12-18 | Resolved: 2025-01-21

## 2025-01-21 PROBLEM — I20.89 OTHER FORMS OF ANGINA PECTORIS (HCC): Status: RESOLVED | Noted: 2024-07-02 | Resolved: 2025-01-21

## 2025-01-21 PROCEDURE — 1123F ACP DISCUSS/DSCN MKR DOCD: CPT | Performed by: INTERNAL MEDICINE

## 2025-01-21 PROCEDURE — G8427 DOCREV CUR MEDS BY ELIG CLIN: HCPCS | Performed by: INTERNAL MEDICINE

## 2025-01-21 PROCEDURE — 1036F TOBACCO NON-USER: CPT | Performed by: INTERNAL MEDICINE

## 2025-01-21 PROCEDURE — 1090F PRES/ABSN URINE INCON ASSESS: CPT | Performed by: INTERNAL MEDICINE

## 2025-01-21 PROCEDURE — 1159F MED LIST DOCD IN RCRD: CPT | Performed by: INTERNAL MEDICINE

## 2025-01-21 PROCEDURE — 99213 OFFICE O/P EST LOW 20 MIN: CPT | Performed by: INTERNAL MEDICINE

## 2025-01-21 PROCEDURE — G8417 CALC BMI ABV UP PARAM F/U: HCPCS | Performed by: INTERNAL MEDICINE

## 2025-01-21 NOTE — ASSESSMENT & PLAN NOTE
Clinically stable.  Recent noninvasive workup was negative for ischemia.  Continue risk factor modification.  Continue Plavix.

## 2025-01-21 NOTE — PROGRESS NOTES
aKrime Booker  1935  Maikel Farfan MD      Chief Complaint   Patient presents with    Follow-Up from Hospital     Was in hospital in December. Denies any chest pain or discomfort for the last 2 months. No SOB, Dizziness or edema     Fatigue    Hyperlipidemia     Last lipid panel 12/18/24    Palpitations     Has had them for years     Chief complaint and HPI:  Karime Booker  is a 89 y.o. female following up for known history of coronary artery ectasia and has hyperlipidemia.  She was admitted with chest pains last month noninvasive cardiac workup was negative for any ischemic heart disease.  She reports increasing arthritic symptoms in her cervical spine and has constant aching in her shoulder more on the left than on the right and she is seeing Dr. Parekh spine surgeon and he did not offer any interventions due to her extreme age.  She is walking with a walker had not had any falls and she has worsening tremors of her upper extremities.    Rest of the Cardiovascular system review is otherwise unchanged from prior encounter.  Past medical history:  has a past medical history of Age-related nuclear cataract of left eye, Age-related nuclear cataract of right eye, Anxiety, Coronary artery disease involving native coronary artery of native heart without angina pectoris, Essential tremor, H/O cardiac catheterization, H/O cardiovascular stress test, H/O echocardiogram, H/O exercise stress test, Heart murmur, Heart palpitations, Hyperlipidemia, MVP (mitral valve prolapse), Obesity, Palpitations, Tachycardia, and Uterine prolapse.  Past surgical history:  has a past surgical history that includes Cholecystectomy; Tonsillectomy; Dilation and curettage of uterus; Vaginal prolapse repair (11/04/2019); Cataract removal (Right, 02/11/2022); Cataract removal (Left, 03/08/2022); and Foot fracture surgery (Right).  Social History:   Social History     Tobacco Use    Smoking status: Never    Smokeless tobacco: Never

## 2025-01-21 NOTE — PATIENT INSTRUCTIONS
Continue current medications which have been reviewed and discussed individually with you.  Appropriate prescriptions if needed on this visit are addressed. After visit summery is provided.   Questions answered and patient verbalizes understanding. Follow up in 12 months with ECG,  sooner if needed.

## 2025-01-21 NOTE — ASSESSMENT & PLAN NOTE
Her recent LDL was 100 which is improved compared to results and she is on diet and weight management she has statin intolerance.

## 2025-01-21 NOTE — ASSESSMENT & PLAN NOTE
She has been seen by Dr. Parekh spine surgeon.  Continue medical management and advised on fall precautions.

## 2025-01-27 ENCOUNTER — OFFICE VISIT (OUTPATIENT)
Age: 89
End: 2025-01-27
Payer: MEDICARE

## 2025-01-27 VITALS
SYSTOLIC BLOOD PRESSURE: 118 MMHG | DIASTOLIC BLOOD PRESSURE: 70 MMHG | HEART RATE: 78 BPM | BODY MASS INDEX: 30.21 KG/M2 | WEIGHT: 176 LBS | OXYGEN SATURATION: 96 %

## 2025-01-27 DIAGNOSIS — G25.0 ESSENTIAL TREMOR: ICD-10-CM

## 2025-01-27 PROCEDURE — 1090F PRES/ABSN URINE INCON ASSESS: CPT | Performed by: NURSE PRACTITIONER

## 2025-01-27 PROCEDURE — G8427 DOCREV CUR MEDS BY ELIG CLIN: HCPCS | Performed by: NURSE PRACTITIONER

## 2025-01-27 PROCEDURE — 99213 OFFICE O/P EST LOW 20 MIN: CPT | Performed by: NURSE PRACTITIONER

## 2025-01-27 PROCEDURE — G8417 CALC BMI ABV UP PARAM F/U: HCPCS | Performed by: NURSE PRACTITIONER

## 2025-01-27 PROCEDURE — 99212 OFFICE O/P EST SF 10 MIN: CPT | Performed by: NURSE PRACTITIONER

## 2025-01-27 PROCEDURE — 1123F ACP DISCUSS/DSCN MKR DOCD: CPT | Performed by: NURSE PRACTITIONER

## 2025-01-27 PROCEDURE — 1036F TOBACCO NON-USER: CPT | Performed by: NURSE PRACTITIONER

## 2025-01-27 PROCEDURE — 1159F MED LIST DOCD IN RCRD: CPT | Performed by: NURSE PRACTITIONER

## 2025-01-27 RX ORDER — PROPRANOLOL HYDROCHLORIDE 10 MG/1
20 TABLET ORAL 3 TIMES DAILY
Qty: 180 TABLET | Refills: 3 | Status: SHIPPED | OUTPATIENT
Start: 2025-01-27

## 2025-01-27 NOTE — PROGRESS NOTES
1/27/25    Kairme Booker  1935    Chief Complaint   Patient presents with    Follow-up     3m follow up for tremor. Pt states tremors are the same.        History of Present Illness  Karime is a 89 y.o. female presenting today for follow-up of: Essential tremor. She is on propranolol to 20 mg twice daily and gabapentin 100 mg at bedtime was started last visit but she tells me it was not helpful so she self discontinued.  She would like to increase her propranolol.  For her tremor,  she has tried primidone 50 mg, Topamax 25 mg daily-could not tolerate higher dose.        Current Outpatient Medications   Medication Sig Dispense Refill    propranolol (INDERAL) 10 MG tablet Take 2 tablets by mouth 3 times daily 180 tablet 3    Multiple Vitamins-Minerals (CENTRUM SILVER 50+WOMEN PO) Take 1 each by mouth daily      acetaminophen (TYLENOL) 500 MG tablet Take 2 tablets by mouth 3 times daily as needed for Pain 42 tablet 0    clopidogrel (PLAVIX) 75 MG tablet Take 1 tablet by mouth daily 90 tablet 3    celecoxib (CELEBREX) 200 MG capsule Take 1 capsule by mouth every other day Pt taking every other day 45 capsule 3    nitroGLYCERIN (NITROSTAT) 0.3 MG SL tablet Place 1 tablet under the tongue every 5 minutes as needed for Chest pain (1-3 doses a day) 25 tablet 1    Multiple Vitamins-Minerals (PRESERVISION AREDS) CAPS Take 1 capsule by mouth 2 times daily      Cholecalciferol (VITAMIN D-3 PO) Take 1 capsule by mouth daily       No current facility-administered medications for this visit.     /70   Pulse 78   Wt 79.8 kg (176 lb)   SpO2 96%   BMI 30.21 kg/m²   Physical Exam:  Mental Status: A&O to self, location, month and year, NAD, speech clear, language fluent, repetition and naming intact, follows commands appropriately     Cranial Nerve Exam:   CN II-XII: PERRL, VFF, no nystagmus, no gaze paresis, sensation V1-V3 intact b/l, muscles of facial expression symmetric; hearing intact to conversational tone,

## 2025-01-30 ENCOUNTER — OFFICE VISIT (OUTPATIENT)
Dept: PULMONOLOGY | Age: 89
End: 2025-01-30
Payer: MEDICARE

## 2025-01-30 VITALS — WEIGHT: 176.2 LBS | BODY MASS INDEX: 29.36 KG/M2 | OXYGEN SATURATION: 96 % | HEART RATE: 74 BPM | HEIGHT: 65 IN

## 2025-01-30 DIAGNOSIS — E66.3 OVERWEIGHT (BMI 25.0-29.9): ICD-10-CM

## 2025-01-30 DIAGNOSIS — R91.1 RIGHT LOWER LOBE PULMONARY NODULE: Primary | ICD-10-CM

## 2025-01-30 PROCEDURE — 1090F PRES/ABSN URINE INCON ASSESS: CPT | Performed by: INTERNAL MEDICINE

## 2025-01-30 PROCEDURE — 1123F ACP DISCUSS/DSCN MKR DOCD: CPT | Performed by: INTERNAL MEDICINE

## 2025-01-30 PROCEDURE — 99214 OFFICE O/P EST MOD 30 MIN: CPT | Performed by: INTERNAL MEDICINE

## 2025-01-30 PROCEDURE — G8417 CALC BMI ABV UP PARAM F/U: HCPCS | Performed by: INTERNAL MEDICINE

## 2025-01-30 PROCEDURE — 1159F MED LIST DOCD IN RCRD: CPT | Performed by: INTERNAL MEDICINE

## 2025-01-30 PROCEDURE — G8427 DOCREV CUR MEDS BY ELIG CLIN: HCPCS | Performed by: INTERNAL MEDICINE

## 2025-01-30 PROCEDURE — 1036F TOBACCO NON-USER: CPT | Performed by: INTERNAL MEDICINE

## 2025-01-30 ASSESSMENT — ENCOUNTER SYMPTOMS
EYE DISCHARGE: 0
EYE ITCHING: 0
ABDOMINAL PAIN: 0
ABDOMINAL DISTENTION: 0
COUGH: 0
BACK PAIN: 0
SHORTNESS OF BREATH: 0

## 2025-01-30 NOTE — PROGRESS NOTES
Karime Booker  1935  Referring Provider: Maikel Farfan, [facility] - General     Subjective:     Chief Complaint   Patient presents with    Results       HPI  Karime is a 89 y.o. female has come back as a follow up. She was seen 1 year ago for the 6 mm RLL nodule. She has no h/o smoking, no cancers.She has no symptoms now. Her pets are outside the house.    She had a Low Dose CT chest done on 01/13/24 which showed:    1.  There are subcentimeter noncalcified pulmonary nodules seen. These are   unchanged when compared to 8/11/2023  2.  No new or enlarging nodule is seen  3.  Central bronchiectasis  4.  Small hiatal hernia      Lungs : There is a solid, noncalcified nodule in the right lower lobe best seen   on image #83 series #3 measuring 7 mm (previous 6 mm). There is a solid,   noncalcified 3 mm nodule in the left lower lobe on image #74 series #3 (previous   3 mm). There is no definitive evidence for a new or enlarging noncalcified   nodule.  Current Outpatient Medications   Medication Sig Dispense Refill    propranolol (INDERAL) 10 MG tablet Take 2 tablets by mouth 3 times daily 180 tablet 3    Multiple Vitamins-Minerals (CENTRUM SILVER 50+WOMEN PO) Take 1 each by mouth daily      clopidogrel (PLAVIX) 75 MG tablet Take 1 tablet by mouth daily 90 tablet 3    nitroGLYCERIN (NITROSTAT) 0.3 MG SL tablet Place 1 tablet under the tongue every 5 minutes as needed for Chest pain (1-3 doses a day) 25 tablet 1    Multiple Vitamins-Minerals (PRESERVISION AREDS) CAPS Take 1 capsule by mouth 2 times daily      Cholecalciferol (VITAMIN D-3 PO) Take 1 capsule by mouth daily      acetaminophen (TYLENOL) 500 MG tablet Take 2 tablets by mouth 3 times daily as needed for Pain 42 tablet 0    celecoxib (CELEBREX) 200 MG capsule Take 1 capsule by mouth every other day Pt taking every other day 45 capsule 3     No current facility-administered medications for this visit.       Allergies   Allergen Reactions    Lescol      GI Symptoms

## 2025-01-30 NOTE — ASSESSMENT & PLAN NOTE
Its 7 mm now  She has no symptoms  She has no h/o smoking or cancers  I'll repeat CT chest in 1 year

## 2025-02-24 RX ORDER — CLOPIDOGREL BISULFATE 75 MG/1
75 TABLET ORAL DAILY
Qty: 90 TABLET | Refills: 3 | Status: SHIPPED | OUTPATIENT
Start: 2025-02-24

## (undated) DEVICE — BLADE OPHTH 180DEG CUT SURF BLU STR SHRP DBL BVL GRINDLESS

## (undated) DEVICE — SET ADMIN L105IN 10GTT 3 NDL FREE CK VLV 2 PC M LUERLOCK

## (undated) DEVICE — NEEDLE HYPO 23GA L1.5IN TURQ POLYPR HUB S STL THN WALL IM

## (undated) DEVICE — ELECTRODE,RADIOTRANSLUCENT,FOAM,5PK: Brand: MEDLINE

## (undated) DEVICE — HYPODERMIC SAFETY NEEDLE: Brand: MAGELLAN

## (undated) DEVICE — GLOVE ORANGE PI 7 1/2   MSG9075

## (undated) DEVICE — SET EXTN L41IN 2 NDL FREE VALVES FREE INJ PRT

## (undated) DEVICE — Device

## (undated) DEVICE — SOLUTION IRRIG 250ML STRL H2O PLAS POUR BTL USP

## (undated) DEVICE — GLOVE ORANGE PI 7   MSG9070

## (undated) DEVICE — LINE SAMP O2 6.5FT W/FEMALE CONN F/ADULT CAPNOLINE PLUS

## (undated) DEVICE — GLOVE SURG SZ 65 THK91MIL LTX FREE SYN POLYISOPRENE

## (undated) DEVICE — MICROSURGICAL INSTRUMENT RETROBULAR NEEDLE 25GA X 38MM (1 1/2 IN): Brand: ALCON

## (undated) DEVICE — TOWEL,OR,DSP,ST,BLUE,STD,6/PK,12PK/CS: Brand: MEDLINE